# Patient Record
Sex: FEMALE | Race: WHITE | Employment: OTHER | URBAN - METROPOLITAN AREA
[De-identification: names, ages, dates, MRNs, and addresses within clinical notes are randomized per-mention and may not be internally consistent; named-entity substitution may affect disease eponyms.]

---

## 2017-01-03 ENCOUNTER — PATIENT OUTREACH (OUTPATIENT)
Dept: INTERNAL MEDICINE CLINIC | Age: 82
End: 2017-01-03

## 2017-01-05 ENCOUNTER — OFFICE VISIT (OUTPATIENT)
Dept: NEUROLOGY | Age: 82
End: 2017-01-05

## 2017-01-05 VITALS
WEIGHT: 134 LBS | BODY MASS INDEX: 26.31 KG/M2 | OXYGEN SATURATION: 97 % | HEIGHT: 60 IN | HEART RATE: 67 BPM | SYSTOLIC BLOOD PRESSURE: 142 MMHG | DIASTOLIC BLOOD PRESSURE: 82 MMHG | RESPIRATION RATE: 16 BRPM

## 2017-01-05 DIAGNOSIS — R56.9 SEIZURES (HCC): Primary | ICD-10-CM

## 2017-01-05 RX ORDER — LEVETIRACETAM 250 MG/1
250 TABLET ORAL 2 TIMES DAILY
Qty: 180 TAB | Refills: 1 | Status: SHIPPED | OUTPATIENT
Start: 2017-01-05 | End: 2017-06-07 | Stop reason: ALTCHOICE

## 2017-01-05 NOTE — MR AVS SNAPSHOT
Visit Information Date & Time Provider Department Dept. Phone Encounter #  
 1/5/2017  1:40 PM Zak Swanson  [facility] at [address] [id number] Follow-up Instructions Return in about 6 months (around 7/5/2017). Routing History Your Appointments 1/20/2017 11:15 AM  
ROUTINE CARE with Jaime Colbert MD  
[facility] [address]) Appt Note: 1  month f/u  
 [place] [address] [facility] [address]  
[address] [facility] [address] [address] Upcoming Health Maintenance Date Due ZOSTER VACCINE AGE 60> 5/23/1993 INFLUENZA AGE 9 TO ADULT 8/1/2016 MEDICARE YEARLY EXAM 12/9/2016 GLAUCOMA SCREENING Q2Y 6/9/2017 DTaP/Tdap/Td series (2 - Td) 3/31/2026 Allergies as of 1/5/2017  Review Complete On: 1/5/2017 By: Doni Coy No Known Allergies Current Immunizations  Reviewed on 11/15/2016 Name Date Influenza High Dose Vaccine PF 10/13/2015 Influenza Vaccine 10/2/2013 Influenza Vaccine (Quad) PF 9/15/2014 Pneumococcal Conjugate (PCV-13) 11/2/2015 Pneumococcal Polysaccharide (PPSV-23) 9/23/2013 Tdap 3/31/2016  3:27 AM  
  
 Not reviewed this visit You Were Diagnosed With   
  
 Codes Comments Seizures ([facility] [address]    -  Primary ICD-10-CM: R56.9 ICD-9-CM: 780.39 Vitals BP Pulse Resp Height(growth percentile) Weight(growth percentile) SpO2  
 142/82 67 16 5' (1.524 m) 134 lb (60.8 kg) 97% BMI OB Status Smoking Status 26.17 kg/m2 Postmenopausal Former Smoker Vitals History BMI and BSA Data Body Mass Index Body Surface Area  
 26.17 kg/m 2 1.6 m 2 [facility]erred [facility] [phone] Your Updated Medication List  
  
   
 This list is accurate as of: 1/5/17  2:15 PM.  Always use your most recent med list.  
  
  
  
  
 acetaminophen 500 mg tablet Commonly known as:  TYLENOL Take 2 Tabs by mouth every six (6) hours. albuterol 90 mcg/actuation inhaler Commonly known as:  PROAIR HFA Take 2 Puffs by inhalation every four (4) hours as needed for Wheezing or Shortness of Breath. amLODIPine 5 mg tablet Commonly known as:  Crowder Hudson Take 1 Tab by mouth daily. aspirin 81 mg chewable tablet Take 1 Tab by mouth daily. atorvastatin 20 mg tablet Commonly known as:  LIPITOR  
TAKE ONE TABLET BY MOUTH EVERY DAY  
  
 ferrous sulfate 325 mg (65 mg iron) tablet Take 1 Tab by mouth two (2) times a day. Indications: IRON DEFICIENCY ANEMIA MICHAEL-Q PO Take 1 Cap by mouth nightly. latanoprost 0.005 % ophthalmic solution Commonly known as:  Kenith Standing Administer 1 Drop to both eyes nightly. levETIRAcetam 250 mg tablet Commonly known as:  KEPPRA Take 1 Tab by mouth two (2) times a day. lisinopril 5 mg tablet Commonly known as:  Angela Coleen Take 1 Tab by mouth daily. metoprolol tartrate 50 mg tablet Commonly known as:  LOPRESSOR Take 1 Tab by mouth two (2) times a day. MIACALCIN nasal  
Generic drug:  calcitonin (salmon) 1 [place] by IntraNASal route daily. omeprazole 20 mg capsule Commonly known as:  PRILOSEC  
TAKE ONE CAPSULE BY MOUTH ONCE DAILY ONE-HALF HOUR BEFORE A MEAL FOR S TOMACH ACID  
  
 oxyCODONE IR 5 mg immediate release tablet Commonly known as:  Ta Salen Take 1 Tab by mouth every four (4) hours as needed. Max Daily Amount: 30 mg. OYSTER SHELL CALCIUM 500 PO Take 1 Tab by mouth daily. senna-docusate 8.6-50 mg per tablet Commonly known as:  Tempie Shabnam Take 1 Tab by mouth nightly. timolol 0.5 % ophthalmic solution Commonly known as:  TIMOPTIC Administer 1 Drop to right eye two (2) times a day. VITAMIN D3 1,000 unit Cap Generic drug:  cholecalciferol Take 4,000 Units by mouth daily. Prescriptions Sent to Pharmacy Refills  
 levETIRAcetam (KEPPRA) 250 mg tablet 1 Sig: Take 1 Tab by mouth two (2) times a day. Class: Normal  
 [id number] [address] #: [phone] Route: Oral  
  
Follow-up Instructions Return in about 6 months (around 7/5/2017). Patient Instructions A Healthy Lifestyle: Care Instructions Your Care Instructions A healthy lifestyle can help you feel good, stay at a healthy weight, and have plenty of energy for both work and play. A healthy lifestyle is something you can share with your whole family. A healthy lifestyle also can lower your risk for serious health problems, such as high blood pressure, heart disease, and diabetes. You can follow a few steps listed below to improve your health and the health of your family. Follow-up care is a key part of your treatment and safety. Be sure to make and go to all appointments, and call your doctor if you are having problems. Its also a good idea to know your test results and keep a list of the medicines you take. How can you care for yourself at home? · Do not eat too much sugar, fat, or fast foods. You can still have dessert and treats now and then. The goal is moderation. · Start small to improve your eating habits. Pay attention to portion sizes, drink less juice and soda pop, and eat more fruits and vegetables. ¨ Eat a healthy amount of food. A 3-ounce serving of meat, for example, is about the size of a deck of cards. Fill the rest of your plate with vegetables and whole grains. ¨ Limit the amount of soda and sports drinks you have every day. Drink more water when you are thirsty. ¨ Eat at least 5 servings of fruits and vegetables every day.  It may seem like a lot, but it is not hard to reach this goal. A serving or helping is 1 piece of fruit, 1 cup of vegetables, or 2 cups of leafy, raw vegetables. Have an apple or some carrot sticks as an afternoon snack instead of a candy bar. Try to have fruits and/or vegetables at every meal. 
· Make exercise part of your daily routine. You may want to start with simple activities, such as walking, bicycling, or slow swimming. Try to be active 30 to 60 minutes every day. You do not need to do all 30 to 60 minutes all at once. For example, you can exercise 3 times a day for 10 or 20 minutes. Moderate exercise is safe for most people, but it is always a good idea to talk to your doctor before starting an exercise program. 
· Keep moving. Curtis Naseem the lawn, work in the garden, or Green Clean. Take the stairs instead of the elevator at work. · If you smoke, quit. People who smoke have an increased risk for heart attack, stroke, cancer, and other lung illnesses. Quitting is hard, but there are ways to boost your chance of quitting tobacco for good. ¨ Use nicotine gum, patches, or lozenges. ¨ Ask your doctor about stop-smoking programs and medicines. ¨ Keep trying. In addition to reducing your risk of diseases in the future, you will notice some benefits soon after you stop using tobacco. If you have shortness of breath or asthma symptoms, they will likely get better within a few weeks after you quit. · Limit how much alcohol you drink. Moderate amounts of alcohol (up to 2 drinks a day for men, 1 drink a day for women) are okay. But drinking too much can lead to liver problems, high blood pressure, and other health problems. Family health If you have a family, there are many things you can do together to improve your health. · Eat meals together as a family as often as possible. · Eat healthy foods. This includes fruits, vegetables, lean meats and dairy, and whole grains. · Include your family in your fitness plan.  Most people think of activities such as jogging or tennis as the way to fitness, but there are many ways you and your family can be more active. Anything that makes you breathe hard and gets your heart pumping is exercise. Here are some tips: 
¨ Walk to do errands or to take your child to school or the bus. ¨ Go for a family bike ride after dinner instead of watching TV. Where can you learn more? Go to http://adan-matthew.info/. Enter H360 in the search box to learn more about \"A Healthy Lifestyle: Care Instructions. \" Current as of: July 26, 2016 Content Version: 11.1 © 9415-4719 [employer]. Care instructions adapted under license by [employer] (which disclaims liability or warranty for this information). If you have questions about a medical condition or this instruction, always ask your healthcare professional. [address] any warranty or liability for your use of this information. Introducing [facility]! Dear Jake Willams: 
Thank you for requesting a Sezion account. Our records indicate that you have previously registered for a Sezion account but its currently inactive. Please call our Sezion support line at [phone]. Additional Information If you have questions, please visit the Frequently Asked Questions section of the Sezion website at [email or web address] Remember, Sezion is NOT to be used for urgent needs. For medical emergencies, dial 911. Now available from your iPhone and Android! Please provide this summary of care documentation to your next provider. Your primary care clinician is listed as Clarisse Post. If you have any questions after today's visit, please call [phone].

## 2017-01-05 NOTE — PATIENT INSTRUCTIONS

## 2017-01-05 NOTE — PROGRESS NOTES
Neurology Progress Note    HISTORY PROVIDED BY: patient and son    Chief Complaint:   Chief Complaint   Patient presents with    Follow-up    Neurologic Problem    Seizure      Subjective: Halina Dandy is a 80 y.o. RH female initially and last seen on 11/15/16 while hospitalized at [facility]. [facility] 11/15-19/16 for episode of LOC at home resulting in a compression fracture at T7 and tongue trauma, with 2 prior episodes of unexplained LOC, the last in April and associated with tongue trauma at that time as well. Additionally, MRI of the brain with tiny acute infarct in the right deep white matter. Exam revealed upgoing toe on the left, otherwise unremarkable. EEG was  normal. Episodes of LOC with tongue trauma were concerning for seizure. No known seizure risk factors other than age. Family denied concerns about dementia. Recommended starting Keppra 250 mg po bid given high risk for injury with subsequent seizures. The stroke seen on MRI would not have been responsible for LOC or seizure. It is hard to know if the two are related. She was started on aspirin 81 mg a day and recommended continued good control of her stroke risk factors. She was also seen by Cardiology and discharged with a loop recorder. She returns for f/u accompanied by her son. She tells me that she was in rehab at [address] [place] [address] until 12/1/16. Still going to therapy as an outpt. She has not had any subsequent episodes of LOC or unusual spells. She does not remember anything from her hospital stay and is repeating herself more. She is able to take her meds correctly everyday and is remembering her family and friends appropriately. Lives independently in a alf community. Cooks for herself. Hasn't returned to playing bridge yet due to conflicts with appointments.       Past Medical History   Diagnosis Date    Arthritis    [place] lesion, chronic 5/8/2013     Seen on EGD 5/7/13    HEART (dyspnea on exertion) 6/12/2015    GERD (gastroesophageal reflux disease)     Hypercholesterolemia     Hypertension 9/22/2010    Ill-defined condition     Osteoporosis 9/22/2010      Past Surgical History   Procedure Laterality Date    Hx orthopaedic  2010     knee replacement    Endoscopy, colon, diagnostic  2008, 2013    Hx gi  2013     egd   kia ulcer    Hx cataract removal      Pr cardiac surg procedure unlist  2008     nuclear stress test normal     Implant  loop recorder  11/17/2016            Social History     Social History    Marital status: SINGLE     Spouse name: N/A    Number of children: N/A    Years of education: N/A     Occupational History    Not on file. Social History Main Topics    Smoking status: Former Smoker     Years: 30.00     Types: Cigarettes     Quit date: 9/21/2000    Smokeless tobacco: Never Used    Alcohol use No    Drug use: No    Sexual activity: Not Currently     Other Topics Concern    Not on file     Social History Narrative     Family History   Problem Relation Age of Onset    Heart Disease Mother     Heart Disease Father     Heart Disease Sister     Alzheimer Sister     Alcohol abuse Brother           Objective:   ROS:  Per HPI-  Otherwise 10 point ROS was negative    No Known Allergies    Meds:  Outpatient Medications Prior to Visit   Medication Sig Dispense Refill    metoprolol tartrate (LOPRESSOR) 50 mg tablet Take 1 Tab by mouth two (2) times a day. 180 Tab 1    amLODIPine (NORVASC) 5 mg tablet Take 1 Tab by mouth daily. 90 Tab 1    lisinopril (PRINIVIL, ZESTRIL) 5 mg tablet Take 1 Tab by mouth daily. 90 Tab 1    levETIRAcetam (KEPPRA) 250 mg tablet Take 1 Tab by mouth two (2) times a day. 180 Tab 1    cholecalciferol (VITAMIN D3) 1,000 unit cap Take 4,000 Units by mouth daily.  calcitonin, salmon, (MIACALCIN) nasal 1 [place] by IntraNASal route daily.  L. ACIDOPHILUS/STREPT/LA P-AZAEL (MICHAEL-Q PO) Take 1 Cap by mouth nightly.       acetaminophen (TYLENOL) 500 mg tablet Take 2 Tabs by mouth every six (6) hours. 100 Tab 0    aspirin 81 mg chewable tablet Take 1 Tab by mouth daily. 30 Tab 0    oxyCODONE IR (ROXICODONE) 5 mg immediate release tablet Take 1 Tab by mouth every four (4) hours as needed. Max Daily Amount: 30 mg. 20 Tab 0    senna-docusate (PERICOLACE) 8.6-50 mg per tablet Take 1 Tab by mouth nightly. (Patient taking differently: Take 1 Tab by mouth every fourty-eight (48) hours. ) 30 Tab 0    albuterol (PROAIR HFA) 90 mcg/actuation inhaler Take 2 Puffs by inhalation every four (4) hours as needed for Wheezing or Shortness of Breath. 1 Inhaler 4    atorvastatin (LIPITOR) 20 mg tablet TAKE ONE TABLET BY MOUTH EVERY DAY 30 Tab 11    omeprazole (PRILOSEC) 20 mg capsule TAKE ONE CAPSULE BY MOUTH ONCE DAILY ONE-HALF HOUR BEFORE A MEAL FOR S TOMACH ACID 30 Cap 11    CALCIUM CARBONATE (OYSTER SHELL CALCIUM 500 PO) Take 1 Tab by mouth daily.  ferrous sulfate 325 mg (65 mg iron) tablet Take 1 Tab by mouth two (2) times a day. Indications: IRON DEFICIENCY ANEMIA      latanoprost (XALATAN) 0.005 % ophthalmic solution Administer 1 Drop to both eyes nightly.  timolol (TIMOPTIC) 0.5 % ophthalmic solution Administer 1 Drop to right eye two (2) times a day. No facility-administered medications prior to visit. Imaging:  MRI Results (most recent):    Results from Hospital Encounter encounter on 11/15/16   MRI [facility] SPINE WO CONT   Narrative EXAM:  MRI [facility] SPINE WO CONT  INDICATION:  Back pain after fall. X-ray suggest T7 fracture. TECHNIQUE: Sagittal T1, T2, STIR and axial T1 and T2 weighted images of the  thoracic spine were obtained. COMPARISON: Thoracic spine x-ray 11/15/16  FINDINGS:  Mild generalized accentuated thoracic kyphosis. Thoracic vertebral alignment is otherwise adequately maintained. There is a compression fracture involving the superior endplate of T7.  There is  mild edema and a linear hypointensity on T1-weighted images defining fracture  lines. Fracture extends into the posterior endplate without significant  retropulsion. . Findings are consistent with an acute compression compression  fracture. The other thoracic vertebra signal and appearance is within normal limits with  no other acute osseous abnormality demonstrated. Mild disc bulging and minor degenerative changes without significant spinal  stenosis. The thoracic spinal cord has normal size, contour and signal.         Impression IMPRESSION:  Acute T7 superior endplate compression fracture. CT Results (most recent):    Results from Hospital Encounter encounter on 11/15/16   CT HEAD WO CONT   Narrative EXAM:  CT HEAD WITHOUT CONTRAST    INDICATION: Found on the floor after fall. COMPARISON: 3/31/2016. CONTRAST: None. TECHNIQUE: Unenhanced CT of the head was performed using 5 mm images. Brain and  bone windows were generated. Sagittal and coronal reformations were generated. CT dose reduction was achieved through use of a standardized protocol tailored  for this examination and automatic exposure control for dose modulation. CT dose  reduction was achieved through use of a standardized protocol tailored for this  examination and automatic exposure control for dose modulation. Adaptive  statistical iterative reconstruction (ASIR) was utilized for this examination. FINDINGS:  The ventricles and sulci are normal in size, shape and configuration and  midline. There is atherosclerotic calcification of the vertebral arteries. There is patchy decreased attenuation in the periventricular white matter which  is nonspecific but consistent with small vessel disease. There is no  intracranial hemorrhage. There is no extra-axial collection, mass, mass effect  or midline shift. The basilar cisterns are open. No acute infarct is  identified. The bone windows demonstrate no abnormalities.   The visualized  portions of the paranasal sinuses and mastoid air cells are clear.         Impression IMPRESSION: No hemorrhage or acute intracranial abnormality. Microvascular  disease unchanged.              Reviewed records in [employer] and media tab today    Lab Review   Results for orders placed or performed during the hospital encounter of 11/19/16   CULTURE, URINE   Result Value Ref Range    Special Requests: NO SPECIAL REQUESTS      [place] Count >100,000  COLONIES/mL        Culture result: MIXED UROGENITAL MICHAEL ISOLATED     VITAMIN D, 25 HYDROXY   Result Value Ref Range    Vitamin D 25-Hydroxy 28.2 (L) 30 - 100 ng/mL   URINALYSIS W/ REFLEX CULTURE   Result Value Ref Range    Color YELLOW/STRAW      Appearance CLOUDY (A) CLEAR      Specific gravity 1.022 1.003 - 1.030      pH (UA) 6.5 5.0 - 8.0      Protein NEGATIVE  NEG mg/dL    Glucose NEGATIVE  NEG mg/dL    Ketone NEGATIVE  NEG mg/dL    Bilirubin NEGATIVE  NEG      Blood NEGATIVE  NEG      Urobilinogen 0.2 0.2 - 1.0 EU/dL    Nitrites NEGATIVE  NEG      Leukocyte Esterase MODERATE (A) NEG      WBC 20-50 0 - 4 /hpf    RBC 5-10 0 - 5 /hpf    Epithelial cells MODERATE (A) FEW /lpf    Bacteria NEGATIVE  NEG /hpf    UA:UC IF INDICATED URINE CULTURE ORDERED (A) CNI      Other: Renal Epithelial cells Present     CBC W/O DIFF   Result Value Ref Range    WBC 7.8 3.6 - 11.0 K/uL    RBC 4.52 3.80 - 5.20 M/uL    HGB 10.1 (L) 11.5 - 16.0 g/dL    HCT 33.6 (L) 35.0 - 47.0 %    MCV 74.3 (L) 80.0 - 99.0 FL    MCH 22.3 (L) 26.0 - 34.0 PG    MCHC 30.1 30.0 - 36.5 g/dL    RDW 19.7 (H) 11.5 - 14.5 %    PLATELET 070 (H) 850 - 866 K/uL   METABOLIC PANEL, BASIC   Result Value Ref Range    Sodium 138 136 - 145 mmol/L    Potassium 4.2 3.5 - 5.1 mmol/L    Chloride 100 97 - 108 mmol/L    CO2 27 21 - 32 mmol/L    Anion gap 11 5 - 15 mmol/L    Glucose 72 65 - 100 mg/dL    BUN 12 6 - 20 MG/DL    Creatinine 0.88 0.55 - 1.02 MG/DL    BUN/Creatinine ratio 14 12 - 20      GFR est AA >60 >60 ml/min/1.73m2    GFR est non-AA >60 >60 ml/min/1.73m2    Calcium 9.1 8.5 - 10.1 MG/DL   MAGNESIUM   Result Value Ref Range    Magnesium 1.8 1.6 - 2.4 mg/dL   PHOSPHORUS   Result Value Ref Range    Phosphorus 4.2 2.6 - 4.7 MG/DL        Exam:  Visit Vitals    /82    Pulse 67    Resp 16    Ht 5' (1.524 m)    Wt 60.8 kg (134 lb)    SpO2 97%    BMI 26.17 kg/m2     General:  Alert, cooperative, no distress. Head:  Normocephalic, without obvious abnormality, atraumatic. Respiratory:  Heart:   Non labored breathing  Regular rate and rhythm, no murmurs       Extremities: Warm, no cyanosis or edema. Pulses: 2+ radial pulses. Neurologic:  MS: Alert, speech intact. Language -see MMSE Attention and fund of knowledge appropriate. Recent and remote memory intact.   Cranial Nerves:  II: visual fields Full to confrontation   II: pupils    II: optic disc    III,VII: ptosis none   III,IV,VI: extraocular muscles  EOMI, no nystagmus or diplopia   V: facial light touch sensation     VII: facial muscle function   symmetric   VIII: hearing intact   IX: soft palate elevation     XI: trapezius strength     XI: sternocleidomastoid strength    XII: tongue       Motor: normal bulk and tone, no tremor              Strength: 5/5 throughout, no PD  Coordination: FTN and HTS intact, SUNITHA intact  Gait: normal gait  Reflexes: 2+ symmetric    Mini Mental State Exam 1/5/2017   What is the Year 1   What is the Season 1   What is the Date 1   What is the Day 1   What is the Month 1   Where are we State 1   Where are we Country 1   Where are we [place] or [employer] 1   Cook are we Floor 1   Name three objects, then ask the patient to say them 3   Serial sevens Subtract 7 from 100 in increments 5   Ask for the three objects repeated above 3   Name a pencil 1   Name a watch 1   Have the patient repeat this phrase \"No ifs, ands, or buts\" 1   Three stage command: Take the paper in your right hand 1   Fold the paper in half 1   Put the paper on the floor 1   Read and obey the following: CLOSE YOUR EYES 1   Have the patient write a sentence 1   Have the patient copy a figure 1   Mini Mental Score 30          Assessment/Plan   Pt is an 80 y.o. RH female hospitalized at [facility] 11/15-19/16 for episode of LOC at home resulting in a compression fracture at T7 and tongue trauma, with 2 prior episodes of unexplained LOC, the last in April and associated with tongue trauma at that time as well. Additionally, MRI of the brain with tiny acute infarct in the right deep white matter. EEG was  normal. Started on Keppra 250mg bid empirically for seizures without episode of LOC since discharge. Exam with MMSE score of 30/30, and is non-focal.  Will continue to monitor for memory loss. Continue Keppra 250mg bid. F/u in clinic in six months, instructed to call in the interim if needed. ICD-10-CM ICD-9-CM    1. Seizures ([facility] [address] R56.9 780.39        Signed:   Ciera Boyle MD  1/5/2017

## 2017-01-06 ENCOUNTER — DOCUMENTATION ONLY (OUTPATIENT)
Dept: CARDIOLOGY CLINIC | Age: 82
End: 2017-01-06

## 2017-01-13 ENCOUNTER — OFFICE VISIT (OUTPATIENT)
Dept: CARDIOLOGY CLINIC | Age: 82
End: 2017-01-13

## 2017-01-13 DIAGNOSIS — Z95.818 STATUS POST PLACEMENT OF IMPLANTABLE LOOP RECORDER: Primary | ICD-10-CM

## 2017-01-27 ENCOUNTER — HOSPITAL ENCOUNTER (OUTPATIENT)
Dept: LAB | Age: 82
Discharge: HOME | End: 2017-01-27
Payer: MEDICARE

## 2017-01-27 ENCOUNTER — OFFICE VISIT (OUTPATIENT)
Dept: INTERNAL MEDICINE CLINIC | Age: 82
End: 2017-01-27

## 2017-01-27 VITALS
HEIGHT: 60 IN | OXYGEN SATURATION: 96 % | TEMPERATURE: 96.1 F | WEIGHT: 134 LBS | RESPIRATION RATE: 18 BRPM | HEART RATE: 64 BPM | DIASTOLIC BLOOD PRESSURE: 62 MMHG | SYSTOLIC BLOOD PRESSURE: 119 MMHG | BODY MASS INDEX: 26.31 KG/M2

## 2017-01-27 DIAGNOSIS — M54.6 MIDLINE THORACIC BACK PAIN, UNSPECIFIED CHRONICITY: ICD-10-CM

## 2017-01-27 DIAGNOSIS — D50.0 IRON DEFICIENCY ANEMIA DUE TO CHRONIC BLOOD LOSS: ICD-10-CM

## 2017-01-27 DIAGNOSIS — M81.0 OSTEOPOROSIS: Primary | ICD-10-CM

## 2017-01-27 DIAGNOSIS — I10 HTN (HYPERTENSION), BENIGN: ICD-10-CM

## 2017-01-27 PROCEDURE — 36415 COLL VENOUS BLD VENIPUNCTURE: CPT

## 2017-01-27 PROCEDURE — 85025 COMPLETE CBC W/AUTO DIFF WBC: CPT

## 2017-01-27 RX ORDER — ALENDRONATE SODIUM 70 MG/1
70 TABLET ORAL
Qty: 12 TAB | Refills: 3 | Status: SHIPPED | OUTPATIENT
Start: 2017-02-27 | End: 2018-01-05 | Stop reason: SDUPTHER

## 2017-01-27 NOTE — LETTER
1/30/2017 10:07 AM 
 
Ms. Khanh Dunlap [address] [facility] [phone] Dear Khanh Res: 
 
Please find your most recent results below. Resulted Orders CBC WITH AUTOMATED DIFF Result Value Ref Range WBC 8.5 3.4 - 10.8 x10E3/uL  
 RBC 4.88 3.77 - 5.28 x10E6/uL HGB 12.4 11.1 - 15.9 g/dL HCT 38.9 34.0 - 46.6 % MCV 80 79 - 97 fL  
 MCH 25.4 (L) 26.6 - 33.0 pg  
 MCHC 31.9 31.5 - 35.7 g/dL RDW 23.5 (H) 12.3 - 15.4 % PLATELET 973 (H) 079 - 379 x10E3/uL NEUTROPHILS 70 % Lymphocytes 19 % MONOCYTES 9 % EOSINOPHILS 1 % BASOPHILS 1 %  
 ABS. NEUTROPHILS 5.9 1.4 - 7.0 x10E3/uL Abs Lymphocytes 1.6 0.7 - 3.1 x10E3/uL  
 ABS. MONOCYTES 0.8 0.1 - 0.9 x10E3/uL  
 ABS. EOSINOPHILS 0.1 0.0 - 0.4 x10E3/uL  
 ABS. BASOPHILS 0.1 0.0 - 0.2 x10E3/uL IMMATURE GRANULOCYTES 0 %  
 ABS. IMM. GRANS. 0.0 0.0 - 0.1 x10E3/uL Hematology comments: Note:   
   Comment:  
   Verified by microscopic examination. Narrative Performed at:  [address]  [id number] : Yue Carpio MD, Phone:  [phone] METABOLIC PANEL, BASIC Result Value Ref Range Glucose 112 (H) 65 - 99 mg/dL BUN 11 8 - 27 mg/dL Creatinine 0.83 0.57 - 1.00 mg/dL GFR est non-AA 65 >59 mL/min/1.73 GFR est AA 75 >59 mL/min/1.73  
 BUN/Creatinine ratio 13 11 - 26 Sodium 138 134 - 144 mmol/L Potassium 4.5 3.5 - 5.2 mmol/L Chloride 97 96 - 106 mmol/L  
 CO2 25 18 - 29 mmol/L Calcium 9.6 8.7 - 10.3 mg/dL Narrative Performed at:  [address] [place]  [id number] : Yue Carpio MD, Phone:  [phone] RECOMMENDATIONS: 
Blood work looks great. Please call me if you have any questions: [phone] Sincerely, Ezequiel Coppola MD

## 2017-01-27 NOTE — MR AVS SNAPSHOT
Visit Information Date & Time Provider Department Dept. Phone Encounter #  
 1/27/2017  2:00 PM Yovany Blackwood MD [facility] [phone] [id number] Your Appointments 7/5/2017 10:40 AM  
Follow Up with Yadi Garcia MD  
[facility] at [facility]) Appt Note: 6 mo F/U   KU 1/6  
 [address] [facility] [address]  
[phone]  
  
   
 [address] [facility] [address] Upcoming Health Maintenance Date Due ZOSTER VACCINE AGE 60> 5/23/1993 INFLUENZA AGE 9 TO ADULT 8/1/2016 MEDICARE YEARLY EXAM 12/9/2016 GLAUCOMA SCREENING Q2Y 6/9/2017 DTaP/Tdap/Td series (2 - Td) 3/31/2026 Allergies as of 1/27/2017  Review Complete On: 1/27/2017 By: Gisselle Hull LPN No Known Allergies Current Immunizations  Reviewed on 11/15/2016 Name Date Influenza High Dose Vaccine PF 10/13/2015 Influenza Vaccine 10/2/2013 Influenza Vaccine (Quad) PF 9/15/2014 Pneumococcal Conjugate (PCV-13) 11/2/2015 Pneumococcal Polysaccharide (PPSV-23) 9/23/2013 Tdap 3/31/2016  3:27 AM  
  
 Not reviewed this visit You Were Diagnosed With   
  
 Codes Comments Osteoporosis    -  Primary ICD-10-CM: M81.0 ICD-9-CM: 733.00 Iron deficiency anemia due to chronic blood loss     ICD-10-CM: D50.0 ICD-9-CM: 280.0 Vitals BP Pulse Temp Resp Height(growth percentile) Weight(growth percentile)  
 119/62 (BP 1 Location: Left arm, BP Patient Position: Sitting) 64 96.1 °F (35.6 °C) (Oral) 18 5' (1.524 m) 134 lb (60.8 kg) SpO2 BMI OB Status Smoking Status 96% 26.17 kg/m2 Postmenopausal Former Smoker BMI and BSA Data Body Mass Index Body Surface Area  
 26.17 kg/m 2 1.6 m 2 Preferred Pharmacy Pharmacy Name Phone [facility] [place] [address] [phone] Your Updated Medication List  
  
 This list is accurate as of: 1/27/17  2:49 PM.  Always use your most recent med list.  
  
  
  
  
 acetaminophen 500 mg tablet Commonly known as:  TYLENOL Take 2 Tabs by mouth every six (6) hours. albuterol 90 mcg/actuation inhaler Commonly known as:  PROAIR HFA Take 2 Puffs by inhalation every four (4) hours as needed for Wheezing or Shortness of Breath. alendronate 70 mg tablet Commonly known as:  FOSAMAX Take 1 Tab by mouth every seven (7) days. Start taking on:  2/27/2017  
  
 amLODIPine 5 mg tablet Commonly known as:  Carey Eagles Take 1 Tab by mouth daily. aspirin 81 mg chewable tablet Take 1 Tab by mouth daily. atorvastatin 20 mg tablet Commonly known as:  LIPITOR  
TAKE ONE TABLET BY MOUTH EVERY DAY  
  
 ferrous sulfate 325 mg (65 mg iron) tablet Take 1 Tab by mouth two (2) times a day. Indications: IRON DEFICIENCY ANEMIA MICHAEL-Q PO Take 1 Cap by mouth nightly. latanoprost 0.005 % ophthalmic solution Commonly known as:  Lindsay Kim Administer 1 Drop to both eyes nightly. levETIRAcetam 250 mg tablet Commonly known as:  KEPPRA Take 1 Tab by mouth two (2) times a day. lisinopril 5 mg tablet Commonly known as:  Jennifer Hu Take 1 Tab by mouth daily. metoprolol tartrate 50 mg tablet Commonly known as:  LOPRESSOR Take 1 Tab by mouth two (2) times a day. MIACALCIN nasal  
Generic drug:  calcitonin (salmon) 1 Gregory by IntraNASal route daily. omeprazole 20 mg capsule Commonly known as:  PRILOSEC  
TAKE ONE CAPSULE BY MOUTH ONCE DAILY ONE-HALF HOUR BEFORE A MEAL FOR S TOMACH ACID OYSTER SHELL CALCIUM 500 PO Take 1 Tab by mouth daily. senna-docusate 8.6-50 mg per tablet Commonly known as:  Leellen Cancel Take 1 Tab by mouth nightly. timolol 0.5 % ophthalmic solution Commonly known as:  TIMOPTIC Administer 1 Drop to right eye two (2) times a day. VITAMIN D3 1,000 unit Cap Generic drug:  cholecalciferol Take 4,000 Units by mouth daily. Prescriptions Sent to Pharmacy Refills  
 alendronate (FOSAMAX) 70 mg tablet 3 Starting on: 2/27/2017 Sig: Take 1 Tab by mouth every seven (7) days. Class: Normal  
 Pharmacy: [id number] [facility], [address] #: [phone] Route: Oral  
  
We Performed the Following CBC WITH AUTOMATED DIFF [99791 CPT(R)] METABOLIC PANEL, BASIC [88920 CPT(R)] Introducing [facility] & HEALTH SERVICES! [facility]: 
Thank you for requesting a [employer] account. Our records indicate that you have previously registered for a [employer] account but its currently inactive. Please call our [employer] support line at [phone]. Additional Information If you have questions, please visit the Frequently Asked Questions section of the [employer] website at [email or web address] Remember, [employer] is NOT to be used for urgent needs. For medical emergencies, dial 911. Now available from your iPhone and Android! Please provide this summary of care documentation to your next provider. Your primary care clinician is listed as Glo Husain. If you have any questions after today's visit, please call [phone].

## 2017-01-27 NOTE — PROGRESS NOTES
Chief Complaint   Patient presents with   [address] [facility] Follow Up     stroke f/u     Subjective: Analisa Bowers is a 80 y.o. RH female initially and last seen on 11/15/16 while hospitalized at [facility]. [facility] 11/15-19/16 for episode of LOC at home resulting in a compression fracture at T7 and tongue trauma, with 2 prior episodes of unexplained LOC, the last in April and associated with tongue trauma at that time as well. Additionally, MRI of the brain with tiny acute infarct in the right deep white matter. Exam revealed upgoing toe on the left, otherwise unremarkable. EEG was normal. Episodes of LOC with tongue trauma were concerning for seizure. No known seizure risk factors other than age. Family denied concerns about dementia. Recommended starting Keppra 250 mg po bid given high risk for injury with subsequent seizures. The stroke seen on MRI would not have been responsible for LOC or seizure. It is hard to know if the two are related. She was started on aspirin 81 mg a day and recommended continued good control of her stroke risk factors. She was also seen by Cardiology and discharged with a loop recorder. (negative)  was in rehab at [address] [place] [address] until 12/1/16. Still going to therapy as an outpt. She has not had any subsequent episodes of LOC or unusual spells. She does not remember anything from her hospital stay and is repeating herself more. She is able to take her meds correctly everyday and is remembering her family and friends appropriately. Lives independently in a group home community. Cooks for herself.  Hasn't returned to playing bridge yet due to conflicts with appointments.        Pain is gome from compression fracture still using the calcitonin spray, back home and  Independent  again    Patient Active Problem List    Diagnosis    Stroke ([facility] [facility]    HTN (hypertension), benign    Diastolic dysfunction    HEART (dyspnea on exertion)    COPD (chronic obstructive pulmonary disease) ([address]    Khadar chowdhury, chronic     Seen on EGD 5/7/13      Iron deficiency anemia    Osteoporosis     Fosamax  2005  No fractures  In all these years      Hypercholesteremia       Denies gerd    Vitals:    01/27/17 1425   BP: 119/62   Pulse: 64   Resp: 18   Temp: 96.1 °F (35.6 °C)   TempSrc: Oral   SpO2: 96%   Weight: 134 lb (60.8 kg)   Height: 5' (1.524 m)     S1 and S2 normal, no murmurs, clicks, gallops or rubs. Regular rate and rhythm. Chest is clear; no wheezes or rales. No edema or JVD. Neuro intact  Uses a cane for ambulation support and balance        Nic Hernandez was seen today for hospital follow up. Diagnoses and all orders for this visit:    Osteoporosis  -     CBC WITH AUTOMATED DIFF  -     METABOLIC PANEL, BASIC    Iron deficiency anemia due to chronic blood loss  -     CBC WITH AUTOMATED DIFF    HTN (hypertension), benign    Midline thoracic back pain, unspecified chronicity    Other orders  -     alendronate (FOSAMAX) 70 mg tablet; Take 1 Tab by mouth every seven (7) days. Will  Begin fosamax in February  \  \Instructions on Fosamax use and side effects - particularly esophageal adverse events - are carefully reviewed with her. This drug must be taken upon arising for the day on an empty stomach, with a large 6-8 ounce glass of water; she must remain NPO in the upright position for at least 30 minutes afterwards and until after the first food of the day. If esophageal irritation is noted, she will stop the drug and call my office.       Prolonged visit with 15 minutes of time out  of more than a  25 minute visit spent counseling patient and family and formulation of care

## 2017-01-28 LAB
BASOPHILS # BLD AUTO: 0.1 X10E3/UL (ref 0–0.2)
BASOPHILS NFR BLD AUTO: 1 %
BUN SERPL-MCNC: 11 MG/DL (ref 8–27)
BUN/CREAT SERPL: 13 (ref 11–26)
CALCIUM SERPL-MCNC: 9.6 MG/DL (ref 8.7–10.3)
CHLORIDE SERPL-SCNC: 97 MMOL/L (ref 96–106)
CO2 SERPL-SCNC: 25 MMOL/L (ref 18–29)
CREAT SERPL-MCNC: 0.83 MG/DL (ref 0.57–1)
EOSINOPHIL # BLD AUTO: 0.1 X10E3/UL (ref 0–0.4)
EOSINOPHIL NFR BLD AUTO: 1 %
ERYTHROCYTE [DISTWIDTH] IN BLOOD BY AUTOMATED COUNT: 23.5 % (ref 12.3–15.4)
GLUCOSE SERPL-MCNC: 112 MG/DL (ref 65–99)
HCT VFR BLD AUTO: 38.9 % (ref 34–46.6)
HGB BLD-MCNC: 12.4 G/DL (ref 11.1–15.9)
IMM GRANULOCYTES # BLD: 0 X10E3/UL (ref 0–0.1)
IMM GRANULOCYTES NFR BLD: 0 %
LYMPHOCYTES # BLD AUTO: 1.6 X10E3/UL (ref 0.7–3.1)
LYMPHOCYTES NFR BLD AUTO: 19 %
MCH RBC QN AUTO: 25.4 PG (ref 26.6–33)
MCHC RBC AUTO-ENTMCNC: 31.9 G/DL (ref 31.5–35.7)
MCV RBC AUTO: 80 FL (ref 79–97)
MONOCYTES # BLD AUTO: 0.8 X10E3/UL (ref 0.1–0.9)
MONOCYTES NFR BLD AUTO: 9 %
MORPHOLOGY BLD-IMP: ABNORMAL
NEUTROPHILS # BLD AUTO: 5.9 X10E3/UL (ref 1.4–7)
NEUTROPHILS NFR BLD AUTO: 70 %
PLATELET # BLD AUTO: 420 X10E3/UL (ref 150–379)
POTASSIUM SERPL-SCNC: 4.5 MMOL/L (ref 3.5–5.2)
RBC # BLD AUTO: 4.88 X10E6/UL (ref 3.77–5.28)
SODIUM SERPL-SCNC: 138 MMOL/L (ref 134–144)
WBC # BLD AUTO: 8.5 X10E3/UL (ref 3.4–10.8)

## 2017-01-30 NOTE — PROGRESS NOTES
Letter sent to the address on file to notify the patient per Dr Rui Isabel that the blood work looks great. Asked for a return call to the office with any additional questions.

## 2017-02-24 ENCOUNTER — TELEPHONE (OUTPATIENT)
Dept: CARDIOLOGY CLINIC | Age: 82
End: 2017-02-24

## 2017-02-24 NOTE — TELEPHONE ENCOUNTER
----- Message from Sarah Johnson NP sent at 2/24/2017  8:40 AM EST -----  Please call the patient and see if she felt the tachycardia 2/23/17 at 1339. Can we please schedule her for next week, wed or Thursday?

## 2017-02-27 NOTE — TELEPHONE ENCOUNTER
Verified patient with two types of identifiers. Spoke to patient and she states that she did not feel anything on Thursday. Made appt for 3/6/17 with Dr Luis Temple.

## 2017-03-06 ENCOUNTER — OFFICE VISIT (OUTPATIENT)
Dept: CARDIOLOGY CLINIC | Age: 82
End: 2017-03-06

## 2017-03-06 VITALS
OXYGEN SATURATION: 99 % | DIASTOLIC BLOOD PRESSURE: 70 MMHG | BODY MASS INDEX: 26.7 KG/M2 | SYSTOLIC BLOOD PRESSURE: 110 MMHG | RESPIRATION RATE: 16 BRPM | HEIGHT: 60 IN | HEART RATE: 56 BPM | WEIGHT: 136 LBS

## 2017-03-06 DIAGNOSIS — I48.92 ATRIAL FLUTTER, PAROXYSMAL (HCC): Primary | ICD-10-CM

## 2017-03-06 DIAGNOSIS — Z95.9 CARDIAC DEVICE IN SITU: ICD-10-CM

## 2017-03-06 DIAGNOSIS — R55 SYNCOPE, UNSPECIFIED SYNCOPE TYPE: ICD-10-CM

## 2017-03-06 DIAGNOSIS — I10 HTN (HYPERTENSION), BENIGN: ICD-10-CM

## 2017-03-06 DIAGNOSIS — I63.9 CEREBROVASCULAR ACCIDENT (CVA), UNSPECIFIED MECHANISM (HCC): ICD-10-CM

## 2017-03-06 NOTE — PROGRESS NOTES
Cardiac Electrophysiology Office Note     Subjective: Calton Severs is a 80 y.o. patient who is seen for follow up after implantable loop recorder  She just came home after rehab/SNF  He has connected remote [employer]. She is here today for follow up, ILR recorder showed 30 seconds of atrial flutter for 30 second on February 23, 2017 at 1339. She denies feeling the heart rate fast, palpitations. No SOB. No chest pain, lightheadedness or dizziness. When I saw her in the past at [facility]:  She was seen for evaluation of syncope kindly referred by Dr. Jenna Singh.    PMHx includes chronic anemia, hypertension.    The patient reports she has had a total of 4 episodes of syncope, most recent one was yesterday, March 2016, 2010 and 2008.    The patient reports she was laying in bed, the next thing she remembers is waking up on floor at the foot of the bed. She was alert when she woke up, she cannot recall how she got on the floor. She felt \"wiped out\" and had some difficulty moving so she \"scooted\" on the floor the living room and used the IPAD to e-mail her son.    She came to [address] [place] [address], MRI of head revealed suspect small acute posterior frontal centrum semiovale lacunar infarct. MRA of neck- normal  Echo shows normal LVEF, no RWMA. Mild TR and pulmonic valve regurgitation. Xray of spine showed T7 compression fracture of unknown age.       Prior to this she had a similar episode in March of this year, she was getting ready for bed and while walking to the bed she collapsed and lacerated her lip on the bookcase, this did require a hospital visit and sutures. Episode in 2010, similar except she did have associated dizziness prior to fainting. She went to [address] [place] [address] at this time and was told she had a TIA. Episode in 2008 as well.    She denies hx of MI/DVT/PE/DM. She is very independent at home. No issues ambulating.    No family hx of pacemaker.    Mother/father and brother CAD.      Patient Active Problem List   Diagnosis Code    Osteoporosis M81.0    Hypercholesteremia E78.00    Iron deficiency anemia D50.9    Khadar lesion, chronic K25.7    HEART (dyspnea on exertion) R06.09    COPD (chronic obstructive pulmonary disease) ([facility]) G99.8    Diastolic dysfunction W17.6    HTN (hypertension), benign I10    Stroke ([facility]) I63.9     Current Outpatient Prescriptions   Medication Sig Dispense Refill    alendronate (FOSAMAX) 70 mg tablet Take 1 Tab by mouth every seven (7) days. 12 Tab 3    levETIRAcetam (KEPPRA) 250 mg tablet Take 1 Tab by mouth two (2) times a day. 180 Tab 1    metoprolol tartrate (LOPRESSOR) 50 mg tablet Take 1 Tab by mouth two (2) times a day. 180 Tab 1    amLODIPine (NORVASC) 5 mg tablet Take 1 Tab by mouth daily. 90 Tab 1    lisinopril (PRINIVIL, ZESTRIL) 5 mg tablet Take 1 Tab by mouth daily. 90 Tab 1    cholecalciferol (VITAMIN D3) 1,000 unit cap Take 4,000 Units by mouth daily.  acetaminophen (TYLENOL) 500 mg tablet Take 2 Tabs by mouth every six (6) hours. 100 Tab 0    aspirin 81 mg chewable tablet Take 1 Tab by mouth daily. 30 Tab 0    albuterol (PROAIR HFA) 90 mcg/actuation inhaler Take 2 Puffs by inhalation every four (4) hours as needed for Wheezing or Shortness of Breath. 1 Inhaler 4    atorvastatin (LIPITOR) 20 mg tablet TAKE ONE TABLET BY MOUTH EVERY DAY 30 Tab 11    omeprazole (PRILOSEC) 20 mg capsule TAKE ONE CAPSULE BY MOUTH ONCE DAILY ONE-HALF HOUR BEFORE A MEAL FOR S TOMACH ACID 30 Cap 11    CALCIUM CARBONATE (OYSTER SHELL CALCIUM 500 PO) Take 1 Tab by mouth daily.  ferrous sulfate 325 mg (65 mg iron) tablet Take 1 Tab by mouth two (2) times a day. Indications: IRON DEFICIENCY ANEMIA      latanoprost (XALATAN) 0.005 % ophthalmic solution Administer 1 Drop to both eyes nightly.  timolol (TIMOPTIC) 0.5 % ophthalmic solution Administer 1 Drop to right eye two (2) times a day.  L. ACIDOPHILUS/STREPT/LA P-AZAEL (MICHAEL-Q PO) Take 1 Cap by mouth nightly.  senna-docusate (PERICOLACE) 8.6-50 mg per tablet Take 1 Tab by mouth nightly. (Patient taking differently: Take 1 Tab by mouth every fourty-eight (48) hours. ) 30 Tab 0     No Known Allergies  Past Medical History:   Diagnosis Date    Arthritis     Khadar lesion, chronic 5/8/2013    Seen on EGD 5/7/13    HEART (dyspnea on exertion) 6/12/2015    GERD (gastroesophageal reflux disease)     Hypercholesterolemia     Hypertension 9/22/2010    Ill-defined condition     Osteoporosis 9/22/2010     Past Surgical History:   Procedure Laterality Date    CARDIAC SURG PROCEDURE UNLIST  2008    nuclear stress test normal     ENDOSCOPY, COLON, DIAGNOSTIC  2008, 2013    HX CATARACT REMOVAL      HX GI  2013    egd   khadar ulcer    HX ORTHOPAEDIC  2010    knee replacement    IMPLANT  LOOP RECORDER  11/17/2016          Family History   Problem Relation Age of Onset    Heart Disease Mother     Heart Disease Father     Heart Disease Sister     Alzheimer Sister     Alcohol abuse Brother      Social History   Substance Use Topics    Smoking status: Former Smoker     Years: 30.00     Types: Cigarettes     Quit date: 9/21/2000    Smokeless tobacco: Never Used    Alcohol use No        Review of Systems:   Constitutional: Negative for fever, chills, weight loss, malaise/fatigue. HEENT: Negative for nosebleeds, vision changes. Respiratory: Negative for cough, hemoptysis  Cardiovascular: Negative for chest pain, palpitations, orthopnea, claudication, leg swelling, syncope, and PND. Gastrointestinal: Negative for nausea, vomiting, soft stools    Genitourinary: Negative for dysuria, and hematuria. Musculoskeletal: Negative for myalgias, arthralgia. Skin: Negative for rash. Heme: Does not bleed or bruise easily. Neurological: Negative for speech change and focal weakness     Objective:     Physical Exam:   Constitutional: well-nourished. No respiratory distress. Head: Normocephalic and atraumatic.    Eyes: Pupils are equal, round  ENT: hearing normal  Neck: supple. No JVD present. Cardiovascular: Normal rate, regular rhythm. Exam reveals no gallop and no friction rub. No murmur heard. Pulmonary/Chest: Effort normal and breath sounds normal. No wheezes. Abdominal: Soft, no tenderness. Musculoskeletal: no edema. Neurological: alert,oriented. walk in with a cane  Skin: Skin is warm and dry. Left sided ILR healed  Psychiatric: normal mood and affect. Behavior is normal. Judgment and thought content normal.         Assessment/Plan:       ICD-10-CM ICD-9-CM    1. Cardiac device in situ Z95.9 V45.00    2. Cerebrovascular accident (CVA), unspecified mechanism ([facility] [address] I63.9 434.91    3. HTN (hypertension), benign I10 401.1    4. Atrial flutter, paroxysmal (HCC) I48.92 427.32       Reviewed loop monitor results with the patient and her son. She was asymptomatic. Episode of atrial flutter 30 seconds. She seems to be getting stronger and is walking the cane minimally now, no recent falls. BP controlled. Discussed possibility of starting a [address] if he continues to have more episodes of atrial flutter or fibrillation. No pauses or severe bradycardia seen on device. Thank you for involving me in this patient's care and please call with further concerns or questions. Nuris Kingsley M.D.   Electrophysiology/Cardiology  [facility] [place]  [address] , [facility] [address]  [address] [facility], [address] [place] [address]  [phone]

## 2017-03-06 NOTE — PROGRESS NOTES
Chief Complaint   Patient presents with    Irregular Heart Beat     tachycardia on loop monitor. Denies chest pain/swelling. Occasional dypsnea on exertion/shortness of breath.       Hypertension

## 2017-03-06 NOTE — MR AVS SNAPSHOT
Visit Information Date & Time Provider Department Dept. Phone Encounter #  
 3/6/2017  4:20 PM Vel Phillips MD [facility] [phone] [id number] Your Appointments 4/28/2017 11:15 AM  
ROUTINE CARE with Jaiden Whiting MD  
[facility] [address]) Appt Note: 3 month f/u  
 [place] [address] [facility] [address]  
[phone]  
  
   
 [place] [address] [address]  
  
    
 7/5/2017 10:40 AM  
Follow Up with Rakesh Falcon MD  
[facility] at [address]) Appt Note: 6 mo F/U   KU 1/6  
 [address] [facility] [address]  
[phone]  
  
   
 [address] [facility]  [address] Upcoming Health Maintenance Date Due ZOSTER VACCINE AGE 60> 5/23/1993 INFLUENZA AGE 9 TO ADULT 8/1/2016 MEDICARE YEARLY EXAM 12/9/2016 GLAUCOMA SCREENING Q2Y 6/9/2017 DTaP/Tdap/Td series (2 - Td) 3/31/2026 Allergies as of 3/6/2017  Review Complete On: 3/6/2017 By: Torrie Neff RN No Known Allergies Current Immunizations  Reviewed on 11/15/2016 Name Date Influenza High Dose Vaccine PF 10/13/2015 Influenza Vaccine 10/2/2013 Influenza Vaccine (Quad) PF 9/15/2014 Pneumococcal Conjugate (PCV-13) 11/2/2015 Pneumococcal Polysaccharide (PPSV-23) 9/23/2013 Tdap 3/31/2016  3:27 AM  
  
 Not reviewed this visit Vitals BP Pulse Resp Height(growth percentile) Weight(growth percentile) SpO2  
 110/70 (BP 1 Location: Right arm, BP Patient Position: Sitting) (!) 56 16 5' (1.524 m) 136 lb (61.7 kg) 99% BMI OB Status Smoking Status 26.56 kg/m2 Postmenopausal Former Smoker Vitals History BMI and BSA Data Body Mass Index Body Surface Area  
 26.56 kg/m 2 1.62 m 2 Preferred Pharmacy Pharmacy Name Phone [address] [address] [phone] Your Updated Medication List  
  
   
This list is accurate as of: 3/6/17  5:08 PM.  Always use your most recent med list.  
  
  
  
  
 acetaminophen 500 mg tablet Commonly known as:  TYLENOL Take 2 Tabs by mouth every six (6) hours. albuterol 90 mcg/actuation inhaler Commonly known as:  PROAIR HFA Take 2 Puffs by inhalation every four (4) hours as needed for Wheezing or Shortness of Breath. alendronate 70 mg tablet Commonly known as:  FOSAMAX Take 1 Tab by mouth every seven (7) days. amLODIPine 5 mg tablet Commonly known as:  Voncile [place] Take 1 Tab by mouth daily. aspirin 81 mg chewable tablet Take 1 Tab by mouth daily. atorvastatin 20 mg tablet Commonly known as:  LIPITOR  
TAKE ONE TABLET BY MOUTH EVERY DAY  
  
 ferrous sulfate 325 mg (65 mg iron) tablet Take 1 Tab by mouth two (2) times a day. Indications: IRON DEFICIENCY ANEMIA MICHAEL-Q PO Take 1 Cap by mouth nightly. latanoprost 0.005 % ophthalmic solution Commonly known as:  Rebecca Trevor Administer 1 Drop to both eyes nightly. levETIRAcetam 250 mg tablet Commonly known as:  KEPPRA Take 1 Tab by mouth two (2) times a day. lisinopril 5 mg tablet Commonly known as:  Salvatore Bridges Take 1 Tab by mouth daily. metoprolol tartrate 50 mg tablet Commonly known as:  LOPRESSOR Take 1 Tab by mouth two (2) times a day. omeprazole 20 mg capsule Commonly known as:  PRILOSEC  
TAKE ONE CAPSULE BY MOUTH ONCE DAILY ONE-HALF HOUR BEFORE A MEAL FOR S TOMACH ACID OYSTER SHELL CALCIUM 500 PO Take 1 Tab by mouth daily. senna-docusate 8.6-50 mg per tablet Commonly known as:  Euna Brill Take 1 Tab by mouth nightly. timolol 0.5 % ophthalmic solution Commonly known as:  TIMOPTIC Administer 1 Drop to right eye two (2) times a day. VITAMIN D3 1,000 unit Cap Generic drug:  cholecalciferol Take 4,000 Units by mouth daily. Patient Instructions Follow up with Dr. Melyssa Arnold as needed. Introducing hospitals & HEALTH SERVICES! Dear Sharath Perez: 
Thank you for requesting a [employer] account. Our records indicate that you have previously registered for a [employer] account but its currently inactive. Please call our [employer] support line at [phone]. Additional Information If you have questions, please visit the Frequently Asked Questions section of the [employer] website at [email or web address] Remember, [employer] is NOT to be used for urgent needs. For medical emergencies, dial 911. Now available from your iPhone and Android! Please provide this summary of care documentation to your next provider. Your primary care clinician is listed as Stephanie Tirado. If you have any questions after today's visit, please call [phone].

## 2017-03-07 NOTE — PROGRESS NOTES
Cardiac Electrophysiology Office Note     Subjective: Yara Rivera is a 80 y.o. patient who is seen for follow up   ILR recorder showed 30 seconds of atrial flutter for 30 second on February 23, 2017 at 13:39. She denies feeling the heart rate fast, palpitations. No SOB. No chest pain, lightheadedness or dizziness. The patient's son is with her he said that she's getting stronger over time. She does use a cane for walking but has not been falling recently    When I saw her in the past at [facility]:  She was seen for evaluation of syncope kindly referred by Dr. Suleman Mccracken.    PMHx includes chronic anemia, hypertension.    The patient reports she has had a total of 4 episodes of syncope, most recent one was yesterday, March 2016, 2010 and 2008.    The patient reports she was laying in bed, the next thing she remembers is waking up on floor at the foot of the bed. She was alert when she woke up, she cannot recall how she got on the floor. She felt \"wiped out\" and had some difficulty moving so she \"scooted\" on the floor the living room and used the IPAD to e-mail her son.    She came to [facility], MRI of head revealed suspect small acute posterior frontal centrum semiovale lacunar infarct. MRA of neck- normal  Echo shows normal LVEF, no RWMA. Mild TR and pulmonic valve regurgitation. Xray of spine showed T7 compression fracture of unknown age.       Prior to this she had a similar episode in March of this year, she was getting ready for bed and while walking to the bed she collapsed and lacerated her lip on the bookcase, this did require a hospital visit and sutures. Episode in 2010, similar except she did have associated dizziness prior to fainting. She went to [address] [place] [address] at this time and was told she had a TIA. Episode in 2008 as well.    She denies hx of MI/DVT/PE/DM. She is very independent at home. No issues ambulating.    No family hx of pacemaker.    Mother/father and brother CAD.      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 12 Tab 3    levETIRAcetam (KEPPRA) 250 mg tablet Take 1 Tab by mouth two (2) times a day. 180 Tab 1    metoprolol tartrate (LOPRESSOR) 50 mg tablet Take 1 Tab by mouth two (2) times a day. 180 Tab 1    amLODIPine (NORVASC) 5 mg tablet Take 1 Tab by mouth daily. 90 Tab 1    lisinopril (PRINIVIL, ZESTRIL) 5 mg tablet Take 1 Tab by mouth daily. 90 Tab 1    cholecalciferol (VITAMIN D3) 1,000 unit cap Take 4,000 Units by mouth daily.  acetaminophen (TYLENOL) 500 mg tablet Take 2 Tabs by mouth every six (6) hours. 100 Tab 0    aspirin 81 mg chewable tablet Take 1 Tab by mouth daily. 30 Tab 0    albuterol (PROAIR HFA) 90 mcg/actuation inhaler Take 2 Puffs by inhalation every four (4) hours as needed for Wheezing or Shortness of Breath. 1 Inhaler 4    atorvastatin (LIPITOR) 20 mg tablet TAKE ONE TABLET BY MOUTH EVERY DAY 30 Tab 11    omeprazole (PRILOSEC) 20 mg capsule TAKE ONE CAPSULE BY MOUTH ONCE DAILY ONE-HALF HOUR BEFORE A MEAL FOR S TOMACH ACID 30 Cap 11    CALCIUM CARBONATE (OYSTER SHELL CALCIUM 500 PO) Take 1 Tab by mouth daily.  ferrous sulfate 325 mg (65 mg iron) tablet Take 1 Tab by mouth two (2) times a day. Indications: IRON DEFICIENCY ANEMIA      latanoprost (XALATAN) 0.005 % ophthalmic solution Administer 1 Drop to both eyes nightly.  timolol (TIMOPTIC) 0.5 % ophthalmic solution Administer 1 Drop to right eye two (2) times a day.  L.  ACIDOPHILUS/STREPT/LA P-AZAEL (MICHAEL-Q PO) Take 1 Cap by mouth nightly.  senna-docusate (PERICOLACE) 8.6-50 mg per tablet Take 1 Tab by mouth nightly. (Patient taking differently: Take 1 Tab by mouth every fourty-eight (48) hours. ) 30 Tab 0     No Known Allergies  Past Medical History:   Diagnosis Date    Arthritis     Khadar lesion, chronic 5/8/2013    Seen on EGD 5/7/13    HEART (dyspnea on exertion) 6/12/2015    GERD (gastroesophageal reflux disease)     Hypercholesterolemia     Hypertension 9/22/2010    Ill-defined condition     Osteoporosis 9/22/2010     Past Surgical History:   Procedure Laterality Date    CARDIAC SURG PROCEDURE UNLIST  2008    nuclear stress test normal     ENDOSCOPY, COLON, DIAGNOSTIC  2008, 2013    HX CATARACT REMOVAL      HX GI  2013    egd   khadar ulcer    HX ORTHOPAEDIC  2010    knee replacement    IMPLANT  LOOP RECORDER  11/17/2016          Family History   Problem Relation Age of Onset    Heart Disease Mother     Heart Disease Father     Heart Disease Sister     Alzheimer Sister     Alcohol abuse Brother      Social History   Substance Use Topics    Smoking status: Former Smoker     Years: 30.00     Types: Cigarettes     Quit date: 9/21/2000    Smokeless tobacco: Never Used    Alcohol use No        Review of Systems:   Constitutional: Negative for fever, chills, weight loss, malaise/fatigue. HEENT: Negative for nosebleeds, vision changes. Respiratory: Negative for cough, hemoptysis  Cardiovascular: Negative for chest pain, palpitations, orthopnea, claudication, leg swelling, syncope, and PND. Gastrointestinal: Negative for nausea, vomiting, soft stools    Genitourinary: Negative for dysuria, and hematuria. Musculoskeletal: Negative for myalgias, arthralgia. Skin: Negative for rash. Heme: Does not bleed or bruise easily. Neurological: Negative for speech change and focal weakness     Objective:     Physical Exam:   Constitutional: well-nourished. No respiratory distress.    Head: Normocephalic and atraumatic. Eyes: Pupils are equal, round  ENT: hearing normal  Neck: supple. No JVD present. Cardiovascular: Normal rate, regular rhythm. Exam reveals no gallop and no friction rub. No murmur heard. Pulmonary/Chest: Effort normal and breath sounds normal. No wheezes. Abdominal: Soft, no tenderness. Musculoskeletal: no edema. Neurological: alert,oriented. walk in with a cane  Skin: Skin is warm and dry. Left sided ILR healed  Psychiatric: normal mood and affect. Behavior is normal. Judgment and thought content normal.         Assessment/Plan:       ICD-10-CM ICD-9-CM    1. Atrial flutter, paroxysmal (HCC) I48.92 427.32    2. Cerebrovascular accident (CVA), unspecified mechanism ([address] I63.9 434.91    3. HTN (hypertension), benign I10 401.1    4. Cardiac device in situ Z95.9 V45.00    5. Syncope, unspecified syncope type R55 780.2       Reviewed loop monitor results with the patient and her son. She was asymptomatic. Episode of atrial flutter 30 seconds. She seems to be getting stronger and is walking the cane minimally now, no recent falls. BP controlled. Discussed possibility of starting a [address] if he continues to have more episodes of atrial flutter or fibrillation. No pauses or severe bradycardia seen on device. Therefore there is no indication for pacemaker at this time. In the past we have discussed about the risk of fall if she was going to use anticoagulant and her son agrees that we need to continue aspirin at this time  I told him and her that my decisions about anticoagulant may change over time depends on the frequency and duration of atrial flutter  Thank you for involving me in this patient's care and please call with further concerns or questions. Zohreh Mejia M.D.   Electrophysiology/Cardiology  [facility]  [address] , [address]  [place] [id number]  [phone]                                        [phone]

## 2017-03-10 RX ORDER — GLUCOSAMINE SULFATE 1500 MG
4000 POWDER IN PACKET (EA) ORAL DAILY
Qty: 100 CAP | Refills: 4 | Status: SHIPPED | OUTPATIENT
Start: 2017-03-10 | End: 2017-12-04 | Stop reason: SDUPTHER

## 2017-03-10 RX ORDER — AMLODIPINE BESYLATE 5 MG/1
5 TABLET ORAL DAILY
Qty: 90 TAB | Refills: 1 | Status: SHIPPED | OUTPATIENT
Start: 2017-03-10 | End: 2017-09-04 | Stop reason: SDUPTHER

## 2017-03-10 RX ORDER — LISINOPRIL 5 MG/1
5 TABLET ORAL DAILY
Qty: 90 TAB | Refills: 1 | Status: SHIPPED | OUTPATIENT
Start: 2017-03-10 | End: 2017-09-04 | Stop reason: SDUPTHER

## 2017-04-13 ENCOUNTER — OFFICE VISIT (OUTPATIENT)
Dept: CARDIOLOGY CLINIC | Age: 82
End: 2017-04-13

## 2017-04-13 DIAGNOSIS — Z95.818 STATUS POST PLACEMENT OF IMPLANTABLE LOOP RECORDER: Primary | ICD-10-CM

## 2017-04-28 ENCOUNTER — OFFICE VISIT (OUTPATIENT)
Dept: INTERNAL MEDICINE CLINIC | Age: 82
End: 2017-04-28

## 2017-04-28 VITALS
OXYGEN SATURATION: 98 % | WEIGHT: 135 LBS | SYSTOLIC BLOOD PRESSURE: 137 MMHG | HEIGHT: 57 IN | HEART RATE: 54 BPM | RESPIRATION RATE: 16 BRPM | DIASTOLIC BLOOD PRESSURE: 60 MMHG | BODY MASS INDEX: 29.12 KG/M2

## 2017-04-28 DIAGNOSIS — Z00.00 ROUTINE GENERAL MEDICAL EXAMINATION AT A HEALTH CARE FACILITY: ICD-10-CM

## 2017-04-28 DIAGNOSIS — I63.9 CEREBROVASCULAR ACCIDENT (CVA), UNSPECIFIED MECHANISM (HCC): ICD-10-CM

## 2017-04-28 DIAGNOSIS — D50.0 IRON DEFICIENCY ANEMIA DUE TO CHRONIC BLOOD LOSS: ICD-10-CM

## 2017-04-28 DIAGNOSIS — E78.00 HYPERCHOLESTEREMIA: ICD-10-CM

## 2017-04-28 DIAGNOSIS — K59.1 FUNCTIONAL DIARRHEA: ICD-10-CM

## 2017-04-28 DIAGNOSIS — I10 HTN (HYPERTENSION), BENIGN: Primary | ICD-10-CM

## 2017-04-28 RX ORDER — OMEPRAZOLE 20 MG/1
CAPSULE, DELAYED RELEASE ORAL
Qty: 90 CAP | Refills: 1 | Status: SHIPPED | OUTPATIENT
Start: 2017-04-28 | End: 2017-12-10 | Stop reason: SDUPTHER

## 2017-04-28 RX ORDER — ATORVASTATIN CALCIUM 20 MG/1
TABLET, FILM COATED ORAL
Qty: 90 TAB | Refills: 1 | Status: SHIPPED | OUTPATIENT
Start: 2017-04-28 | End: 2017-12-04 | Stop reason: SDUPTHER

## 2017-04-28 NOTE — LETTER
7/7/2017 9:35 AM 
 
Ms. Bean Lawson [address] [place] [id number] Dear Bean Lawson: 
 
Please find your most recent results below. Resulted Orders CBC WITH AUTOMATED DIFF Result Value Ref Range WBC 7.7 3.4 - 10.8 x10E3/uL  
 RBC 4.61 3.77 - 5.28 x10E6/uL HGB 14.3 11.1 - 15.9 g/dL HCT 42.8 34.0 - 46.6 % MCV 93 79 - 97 fL  
 MCH 31.0 26.6 - 33.0 pg  
 MCHC 33.4 31.5 - 35.7 g/dL  
 RDW 15.8 (H) 12.3 - 15.4 % PLATELET 859 771 - 138 x10E3/uL NEUTROPHILS 66 % Lymphocytes 23 % MONOCYTES 8 % EOSINOPHILS 2 % BASOPHILS 1 %  
 ABS. NEUTROPHILS 5.1 1.4 - 7.0 x10E3/uL Abs Lymphocytes 1.8 0.7 - 3.1 x10E3/uL  
 ABS. MONOCYTES 0.6 0.1 - 0.9 x10E3/uL  
 ABS. EOSINOPHILS 0.1 0.0 - 0.4 x10E3/uL  
 ABS. BASOPHILS 0.1 0.0 - 0.2 x10E3/uL IMMATURE GRANULOCYTES 0 %  
 ABS. IMM. GRANS. 0.0 0.0 - 0.1 x10E3/uL Narrative Performed at:  [address]  [id number] : Florina Alarcon MD, Phone:  [phone] LIPID PANEL Result Value Ref Range Cholesterol, total 172 100 - 199 mg/dL Triglyceride 176 (H) 0 - 149 mg/dL HDL Cholesterol 66 >39 mg/dL VLDL, calculated 35 5 - 40 mg/dL LDL, calculated 71 0 - 99 mg/dL Narrative Performed at:  [address]  [id number] : Florina Alarcon MD, Phone:  [phone] METABOLIC PANEL, COMPREHENSIVE Result Value Ref Range Glucose 124 (H) 65 - 99 mg/dL BUN 12 8 - 27 mg/dL Creatinine 1.11 (H) 0.57 - 1.00 mg/dL GFR est non-AA 46 (L) >59 mL/min/1.73 GFR est AA 53 (L) >59 mL/min/1.73  
 BUN/Creatinine ratio 11 (L) 12 - 28 Sodium 135 134 - 144 mmol/L Potassium 4.7 3.5 - 5.2 mmol/L Chloride 96 96 - 106 mmol/L  
 CO2 21 18 - 29 mmol/L Calcium 9.6 8.7 - 10.3 mg/dL Protein, total 7.1 6.0 - 8.5 g/dL Albumin 4.5 3.5 - 4.7 g/dL GLOBULIN, TOTAL 2.6 1.5 - 4.5 g/dL A-G Ratio 1.7 1.2 - 2.2 Bilirubin, total 0.4 0.0 - 1.2 mg/dL Alk. phosphatase 86 39 - 117 IU/L  
 AST (SGOT) 18 0 - 40 IU/L  
 ALT (SGPT) 13 0 - 32 IU/L Narrative Performed at:  [address]  [id number] : Radha Jacques MD, Phone:  [phone] FERRITIN Result Value Ref Range Ferritin 120 15 - 150 ng/mL Narrative Performed at:  [address]  [id number] : Radha Jacques MD, Phone:  [phone] IRON PROFILE Result Value Ref Range TIBC 356 250 - 450 ug/dL UIBC 260 118 - 369 ug/dL Iron 96 27 - 139 ug/dL Iron % saturation 27 15 - 55 % Narrative Performed at:  [address]  [id number] : Radha Jacques MD, Phone:  [phone] CVD REPORT Result Value Ref Range INTERPRETATION NTAP   
 PDF IMAGE Not applicable Narrative Performed at:  [facility] [address] [place] [address]  [id number] : Sindhu Anthony PhD, Phone:  [phone] CKD REPORT Result Value Ref Range Interpretation Note Comment:  
   Supplement report is available. Narrative Performed at:  [facility] [address] [place] [address]  [id number] : Sindhu Anthony PhD, Phone:  [phone] RECOMMENDATIONS: 
Labs are excellent. Please call me if you have any questions: [phone] Sincerely, Colby Cedillo MD

## 2017-04-28 NOTE — MR AVS SNAPSHOT
Visit Information Date & Time Provider Department Dept. Phone Encounter #  
 4/28/2017 11:15 AM Parviz Rosales MD [facility] [phone] [id number] Your Appointments 7/5/2017 10:40 AM  
Follow Up with Cisco Lombardo MD  
[facility]) Appt Note: 6 mo F/U   KU 1/6  
 [address] [facility] [address] [place] [address] [facility] [address]  
[phone]  
  
   
 [address] [place] [address] [address] [facility] [address]  
  
    
 7/12/2017  3:15 PM  
ROUTINE CARE with Parviz Rosales MD  
[facility] App[facility] Note: Follow up visit [place] [address] [place] [address] [facility] [address]  
[address] [facility] [address] [address] Upcoming Health Maintenance Date Due ZOSTER VACCINE AGE 60> 5/23/1993 INFLUENZA AGE 9 TO ADULT 8/1/2016 MEDICARE YEARLY EXAM 12/9/2016 GLAUCOMA SCREENING Q2Y 6/9/2017 DTaP/Tdap/Td series (2 - Td) 3/31/2026 Allergies as of 4/28/2017  Review Complete On: 3/6/2017 By: Sandoval Larios MD  
 No Known Allergies Current Immunizations  Reviewed on 11/15/2016 Name Date Influenza High Dose Vaccine PF 10/13/2015 Influenza Vaccine 10/2/2013 Influenza Vaccine (Quad) PF 9/15/2014 Pneumococcal Conjugate (PCV-13) 11/2/2015 Pneumococcal Polysaccharide (PPSV-23) 9/23/2013 Tdap 3/31/2016  3:27 AM  
  
 Not reviewed this visit You Were Diagnosed With   
  
 Codes Comments HTN (hypertension), benign    -  Primary ICD-10-CM: I10 
ICD-9-CM: 401.1 Cerebrovascular accident (CVA), unspecified mechanism [facility]     ICD-10-CM: I63.9 ICD-9-CM: 434.91 Iron deficiency anemia due to chronic blood loss     ICD-10-CM: D50.0 ICD-9-CM: 280.0 Hypercholesteremia     ICD-10-CM: E78.00 ICD-9-CM: 272.0 Vitals BP Pulse Resp Height(growth percentile) Weight(growth percentile) SpO2 137/60 (!) 54 16 4' 9\" (1.448 m) 135 lb (61.2 kg) 98% BMI OB Status Smoking Status 29.21 kg/m2 Postmenopausal Former Smoker Vitals History BMI and BSA Data Body Mass Index Body Surface Area  
 29.21 kg/m 2 1.57 m 2 [phone] Your Updated Medication List  
  
   
This list is accurate as of: 4/28/17 12:08 PM.  Always use your most recent med list.  
  
  
  
  
 acetaminophen 500 mg tablet Commonly known as:  TYLENOL Take 2 Tabs by mouth every six (6) hours. albuterol 90 mcg/actuation inhaler Commonly known as:  PROAIR HFA Take 2 Puffs by inhalation every four (4) hours as needed for Wheezing or Shortness of Breath. alendronate 70 mg tablet Commonly known as:  FOSAMAX Take 1 Tab by mouth every seven (7) days. amLODIPine 5 mg tablet Commonly known as:  Roxana Ke Take 1 Tab by mouth daily. aspirin 81 mg chewable tablet Take 1 Tab by mouth daily. atorvastatin 20 mg tablet Commonly known as:  LIPITOR  
TAKE ONE TABLET BY MOUTH EVERY DAY  
  
 BENEFIBER SUGAR FREE (DEXTRIN) 3 gram/3.8 gram Powd Generic drug:  wheat dextrin Take  by mouth daily. cholecalciferol 1,000 unit Cap Commonly known as:  VITAMIN D3 Take 4 Caps by mouth daily. ferrous sulfate 325 mg (65 mg iron) tablet Take 1 Tab by mouth two (2) times a day. Indications: IRON DEFICIENCY ANEMIA MICHAEL-Q PO Take 1 Cap by mouth nightly. latanoprost 0.005 % ophthalmic solution Commonly known as:  Delsie [place] Administer 1 Drop to both eyes nightly. levETIRAcetam 250 mg tablet Commonly known as:  KEPPRA Take 1 Tab by mouth two (2) times a day. lisinopril 5 mg tablet Commonly known as:  Desirae Camel Take 1 Tab by mouth daily. metoprolol tartrate 50 mg tablet Commonly known as:  LOPRESSOR  
 Take 1 Tab by mouth two (2) times a day. omeprazole 20 mg capsule Commonly known as:  PRILOSEC  
TAKE ONE CAPSULE BY MOUTH ONCE DAILY ONE-HALF HOUR BEFORE A MEAL FOR S TOMACH ACID OYSTER SHELL CALCIUM 500 PO Take 1 Tab by mouth daily. timolol 0.5 % ophthalmic solution Commonly known as:  TIMOPTIC Administer 1 Drop to right eye two (2) times a day. Prescriptions Sent to Pharmacy Refills  
 atorvastatin (LIPITOR) 20 mg tablet 1 Sig: TAKE ONE TABLET BY MOUTH EVERY DAY Class: Normal  
 Pharmacy: [id number] [facility] [address] [facility] Ph #: [phone]  
 omeprazole (PRILOSEC) 20 mg capsule 1 Sig: TAKE ONE CAPSULE BY MOUTH ONCE DAILY ONE-HALF HOUR BEFORE A MEAL FOR S [address] ACID Class: Normal  
 Pharmacy: [id number] [facility] [address] [place] [address] Ph #: [phone] We Performed the Following CBC WITH AUTOMATED DIFF [95421 CPT(R)] FERRITIN [02138 CPT(R)] IRON PROFILE [id number] CPT(R)] LIPID PANEL [25123 CPT(R)] METABOLIC PANEL, COMPREHENSIVE [91169 CPT(R)] Introducing [facility]! Dear Vazquez Valdez: 
Thank you for requesting a [employer] account. Our records indicate that you have previously registered for a [employer] account but its currently inactive. Please call our [employer] support line at [phone]. Additional Information If you have questions, please visit the Frequently Asked Questions section of the [employer] website at [email or web address] Remember, [employer] is NOT to be used for urgent needs. For medical emergencies, dial 911. Now available from your iPhone and Android! Please provide this summary of care documentation to your next provider. Your primary care clinician is listed as Gonsalo Espinoza. If you have any questions after today's visit, please call [phone].

## 2017-04-29 NOTE — PROGRESS NOTES
Chief Complaint   Patient presents with    Follow-up     discuss iron levels      Subjective: Niraj Hsieh is a 80 y.o. RH femal        Patient Active Problem List    Diagnosis    Stroke ([facility]    HTN (hypertension), benign    Diastolic dysfunction    HEART (dyspnea on exertion)    COPD (chronic obstructive pulmonary disease) ([facility]    Khadar lesion, chronic     Seen on EGD 5/7/13      Iron deficiency anemia    Osteoporosis     Fosamax  2005  No fractures  In all these years      Hypercholesteremia       Denies gerd    Vitals:    04/28/17 1122   BP: 137/60   Pulse: (!) 54   Resp: 16   SpO2: 98%   Weight: 135 lb (61.2 kg)   Height: 4' 9\" (1.448 m)     S1 and S2 normal, no murmurs, clicks, gallops or rubs. Regular rate and rhythm. Chest is clear; no wheezes or rales. No edema or JVD. Neuro intact  Uses a cane for ambulation support and balance        [facility] was seen today for follow-up.     Diagnoses and all orders for this visit:    HTN (hypertension), benign  -     atorvastatin (LIPITOR) 20 mg tablet; TAKE ONE TABLET BY MOUTH EVERY DAY  -     omeprazole (PRILOSEC) 20 mg capsule; TAKE ONE CAPSULE BY MOUTH ONCE DAILY ONE-HALF HOUR BEFORE A MEAL FOR S TOMACH ACID  -     CBC WITH AUTOMATED DIFF  -     LIPID PANEL  -     METABOLIC PANEL, COMPREHENSIVE  -     FERRITIN  -     IRON PROFILE    Cerebrovascular accident (CVA), unspecified mechanism ([facility]  -     atorvastatin (LIPITOR) 20 mg tablet; TAKE ONE TABLET BY MOUTH EVERY DAY  -     omeprazole (PRILOSEC) 20 mg capsule; TAKE ONE CAPSULE BY MOUTH ONCE DAILY ONE-HALF HOUR BEFORE A MEAL FOR S TOMACH ACID  -     CBC WITH AUTOMATED DIFF  -     LIPID PANEL  -     METABOLIC PANEL, COMPREHENSIVE  -     FERRITIN  -     IRON PROFILE    Iron deficiency anemia due to chronic blood loss  -     atorvastatin (LIPITOR) 20 mg tablet; TAKE ONE TABLET BY MOUTH EVERY DAY  -     omeprazole (PRILOSEC) 20 mg capsule; TAKE ONE CAPSULE BY MOUTH ONCE DAILY ONE-HALF HOUR BEFORE A MEAL FOR S TOMACH ACID  -     CBC WITH AUTOMATED DIFF  -     LIPID PANEL  -     METABOLIC PANEL, COMPREHENSIVE  -     FERRITIN  -     IRON PROFILE    Hypercholesteremia  -     atorvastatin (LIPITOR) 20 mg tablet; TAKE ONE TABLET BY MOUTH EVERY DAY  -     omeprazole (PRILOSEC) 20 mg capsule; TAKE ONE CAPSULE BY MOUTH ONCE DAILY ONE-HALF HOUR BEFORE A MEAL FOR S TOMACH ACID  -     CBC WITH AUTOMATED DIFF  -     LIPID PANEL  -     METABOLIC PANEL, COMPREHENSIVE  -     FERRITIN  -     IRON PROFILE        This is a Subsequent Medicare Annual Wellness Visit providing Personalized Prevention Plan Services (PPPS) (Performed 12 months after initial AWV and PPPS )    I have reviewed the patient's medical history in detail and updated the computerized patient record. History     Past Medical History:   Diagnosis Date    Arthritis    Renella Hartshorn lesion, chronic 5/8/2013    Seen on EGD 5/7/13    HEART (dyspnea on exertion) 6/12/2015    GERD (gastroesophageal reflux disease)     Hypercholesterolemia     Hypertension 9/22/2010    Ill-defined condition     Osteoporosis 9/22/2010      Subjective: Melvin Waite is a 80 y.o. RH female initially and last seen on 11/15/16 while hospitalized at [facility]. [place] 11/15-19/16 for episode of LOC at home resulting in a compression fracture at T7 and tongue trauma, with 2 prior episodes of unexplained LOC, the last in April and associated with tongue trauma at that time as well. Additionally, MRI of the brain with tiny acute infarct in the right deep white matter. Exam revealed upgoing toe on the left, otherwise unremarkable. EEG was normal. Episodes of LOC with tongue trauma were concerning for seizure. No known seizure risk factors other than age. Family denied concerns about dementia. Recommended starting Keppra 250 mg po bid given high risk for injury with subsequent seizures. The stroke seen on MRI would not have been responsible for LOC or seizure. It is hard to know if the two are related.  She was started on aspirin 81 mg a day and recommended continued good control of her stroke risk factors. She was also seen by Cardiology and discharged with a loop recorder. (negative)  was in rehab at [address] [place] [address] until 12/1/16. Still going to therapy as an outpt. She has not had any subsequent episodes of LOC or unusual spells. She does not remember anything from her hospital stay and is repeating herself more. She is able to take her meds correctly everyday and is remembering her family and friends appropriately. Lives independently in a correction community. Cooks for herself  Follow up doing well now wioth no pain she is tolerating the fosamax    Loose bowels ? From meds or lactose  Past Surgical History:   Procedure Laterality Date    CARDIAC SURG PROCEDURE UNLIST  2008    nuclear stress test normal     ENDOSCOPY, COLON, DIAGNOSTIC  2008, 2013    HX CATARACT REMOVAL      HX GI  2013    egd   kia ulcer    HX ORTHOPAEDIC  2010    knee replacement    IMPLANT  LOOP RECORDER  11/17/2016          Current Outpatient Prescriptions   Medication Sig Dispense Refill    atorvastatin (LIPITOR) 20 mg tablet TAKE ONE TABLET BY MOUTH EVERY DAY 90 Tab 1    omeprazole (PRILOSEC) 20 mg capsule TAKE ONE CAPSULE BY MOUTH ONCE DAILY ONE-HALF HOUR BEFORE A MEAL FOR S TOMACH ACID 90 Cap 1    wheat dextrin (BENEFIBER SUGAR FREE, DEXTRIN,) 3 gram/3.8 gram powd Take  by mouth daily.  lisinopril (PRINIVIL, ZESTRIL) 5 mg tablet Take 1 Tab by mouth daily. 90 Tab 1    amLODIPine (NORVASC) 5 mg tablet Take 1 Tab by mouth daily. 90 Tab 1    cholecalciferol (VITAMIN D3) 1,000 unit cap Take 4 Caps by mouth daily. 100 Cap 4    alendronate (FOSAMAX) 70 mg tablet Take 1 Tab by mouth every seven (7) days. 12 Tab 3    levETIRAcetam (KEPPRA) 250 mg tablet Take 1 Tab by mouth two (2) times a day. 180 Tab 1    metoprolol tartrate (LOPRESSOR) 50 mg tablet Take 1 Tab by mouth two (2) times a day.  180 Tab 1    acetaminophen (TYLENOL) 500 mg tablet Take 2 Tabs by mouth every six (6) hours. 100 Tab 0    aspirin 81 mg chewable tablet Take 1 Tab by mouth daily. 30 Tab 0    albuterol (PROAIR HFA) 90 mcg/actuation inhaler Take 2 Puffs by inhalation every four (4) hours as needed for Wheezing or Shortness of Breath. 1 Inhaler 4    L. ACIDOPHILUS/STREPT/LA P-AZAEL (MICHAEL-Q PO) Take 1 Cap by mouth nightly.  CALCIUM CARBONATE (OYSTER SHELL CALCIUM 500 PO) Take 1 Tab by mouth daily.  ferrous sulfate 325 mg (65 mg iron) tablet Take 1 Tab by mouth two (2) times a day. Indications: IRON DEFICIENCY ANEMIA      latanoprost (XALATAN) 0.005 % ophthalmic solution Administer 1 Drop to both eyes nightly.  timolol (TIMOPTIC) 0.5 % ophthalmic solution Administer 1 Drop to right eye two (2) times a day. No Known Allergies  Family History   Problem Relation Age of Onset    Heart Disease Mother     Heart Disease Father     Heart Disease Sister     Alzheimer Sister     Alcohol abuse Brother      Social History   Substance Use Topics    Smoking status: Former Smoker     Years: 30.00     Types: Cigarettes     Quit date: 9/21/2000    Smokeless tobacco: Never Used    Alcohol use No     Patient Active Problem List   Diagnosis Code    Osteoporosis M81.0    Hypercholesteremia E78.00    Iron deficiency anemia D50.9    Khadar lesion, chronic K25.7    HEART (dyspnea on exertion) R06.09    COPD (chronic obstructive pulmonary disease) ([facility]) T31.1    Diastolic dysfunction Q63.6    HTN (hypertension), benign I10    Stroke ([facility] I63.9       Depression Risk Factor Screening:     PHQ 2 / 9, over the last two weeks 4/28/2017   Little interest or pleasure in doing things Not at all   Feeling down, depressed or hopeless Not at all   Total Score PHQ 2 0     Alcohol Risk Factor Screening:         Functional Ability and Level of Safety:     Hearing Loss   mild    Activities of Daily Living   Self-care.    Requires assistance with: no ADLs    Fall Risk     Fall Risk Assessment, last 12 mths 4/28/2017   Able to walk? Yes   Fall in past 12 months? No     Abuse Screen   Patient is not abused    Review of Systems   Pertinent items are noted in HPI. Physical Examination     Evaluation of Cognitive Function:  Mood/affect:  happy  Appearance: age appropriate  Family member/caregiver input: family helps a lot  No longer driving    Visit Vitals    /60    Pulse (!) 54    Resp 16    Ht 4' 9\" (1.448 m)    Wt 135 lb (61.2 kg)    SpO2 98%    BMI 29.21 kg/m2     General appearance: alert, cooperative, no distress, appears stated age  Lungs: clear to auscultation bilaterally  Heart: regular rate and rhythm, S1, S2 normal, no murmur, click, rub or gallop  Neurologic: Grossly normal    Patient Care Team:  Kika Hollingsworth MD as PCP - Tanesha Arvizu MD (Cardiology)  Jose Marshall MD as Physician (Neurology)    Advice/Referrals/Counseling   Education and counseling provided:  Are appropriate based on today's review and evaluation  End-of-Life planning (with patient's consent)  Pneumococcal Vaccine  Influenza Vaccine      Assessment/Plan   Bony Mccartney was seen today for follow-up.     Diagnoses and all orders for this visit:    HTN (hypertension), benign  -     atorvastatin (LIPITOR) 20 mg tablet; TAKE ONE TABLET BY MOUTH EVERY DAY  -     omeprazole (PRILOSEC) 20 mg capsule; TAKE ONE CAPSULE BY MOUTH ONCE DAILY ONE-HALF HOUR BEFORE A MEAL FOR S TOMACH ACID  -     CBC WITH AUTOMATED DIFF  -     LIPID PANEL  -     METABOLIC PANEL, COMPREHENSIVE  -     FERRITIN  -     IRON PROFILE    Cerebrovascular accident (CVA), unspecified mechanism ([facility]  -     atorvastatin (LIPITOR) 20 mg tablet; TAKE ONE TABLET BY MOUTH EVERY DAY  -     omeprazole (PRILOSEC) 20 mg capsule; TAKE ONE CAPSULE BY MOUTH ONCE DAILY ONE-HALF HOUR BEFORE A MEAL FOR S TOMACH ACID  -     CBC WITH AUTOMATED DIFF  -     LIPID PANEL  -     METABOLIC PANEL, COMPREHENSIVE  -     FERRITIN  -     IRON PROFILE    Iron deficiency anemia due to chronic blood loss  -     atorvastatin (LIPITOR) 20 mg tablet; TAKE ONE TABLET BY MOUTH EVERY DAY  -     omeprazole (PRILOSEC) 20 mg capsule; TAKE ONE CAPSULE BY MOUTH ONCE DAILY ONE-HALF HOUR BEFORE A MEAL FOR S TOMACH ACID  -     CBC WITH AUTOMATED DIFF  -     LIPID PANEL  -     METABOLIC PANEL, COMPREHENSIVE  -     FERRITIN  -     IRON PROFILE    Hypercholesteremia  -     atorvastatin (LIPITOR) 20 mg tablet; TAKE ONE TABLET BY MOUTH EVERY DAY  -     omeprazole (PRILOSEC) 20 mg capsule; TAKE ONE CAPSULE BY MOUTH ONCE DAILY ONE-HALF HOUR BEFORE A MEAL FOR S TOMACH ACID  -     CBC WITH AUTOMATED DIFF  -     LIPID PANEL  -     METABOLIC PANEL, COMPREHENSIVE  -     FERRITIN  -     IRON PROFILE    Routine general medical examination at a health care facility    . 1. Cerebrovascular accident (CVA), unspecified mechanism ([facility]  She has recovered  - atorvastatin (LIPITOR) 20 mg tablet; TAKE ONE TABLET BY MOUTH EVERY DAY  Dispense: 90 Tab; Refill: 1  - omeprazole (PRILOSEC) 20 mg capsule; TAKE ONE CAPSULE BY MOUTH ONCE DAILY ONE-HALF HOUR BEFORE A MEAL FOR S TOMACH ACID  Dispense: 90 Cap; Refill: 1  - wheat dextrin (BENEFIBER SUGAR FREE, DEXTRIN,) 3 gram/3.8 gram powd; Take  by mouth daily.  - CBC WITH AUTOMATED DIFF  - LIPID PANEL  - METABOLIC PANEL, COMPREHENSIVE  - FERRITIN  - IRON PROFILE    2. Iron deficiency anemia due to chronic blood loss  Will monitor with labs  - atorvastatin (LIPITOR) 20 mg tablet; TAKE ONE TABLET BY MOUTH EVERY DAY  Dispense: 90 Tab; Refill: 1  - omeprazole (PRILOSEC) 20 mg capsule; TAKE ONE CAPSULE BY MOUTH ONCE DAILY ONE-HALF HOUR BEFORE A MEAL FOR S TOMACH ACID  Dispense: 90 Cap; Refill: 1  - wheat dextrin (BENEFIBER SUGAR FREE, DEXTRIN,) 3 gram/3.8 gram powd;  Take  by mouth daily.  - CBC WITH AUTOMATED DIFF  - LIPID PANEL  - METABOLIC PANEL, COMPREHENSIVE  - FERRITIN  - IRON PROFILE    3. HTN (hypertension), benign  controlled  - atorvastatin (LIPITOR) 20 mg tablet; TAKE ONE TABLET BY MOUTH EVERY DAY  Dispense: 90 Tab; Refill: 1  - omeprazole (PRILOSEC) 20 mg capsule; TAKE ONE CAPSULE BY MOUTH ONCE DAILY ONE-HALF HOUR BEFORE A MEAL FOR S TOMACH ACID  Dispense: 90 Cap; Refill: 1  - wheat dextrin (BENEFIBER SUGAR FREE, DEXTRIN,) 3 gram/3.8 gram powd; Take  by mouth daily.  - CBC WITH AUTOMATED DIFF  - LIPID PANEL  - METABOLIC PANEL, COMPREHENSIVE  - FERRITIN  - IRON PROFILE    4. Hypercholesteremia  ok  - atorvastatin (LIPITOR) 20 mg tablet; TAKE ONE TABLET BY MOUTH EVERY DAY  Dispense: 90 Tab; Refill: 1  - omeprazole (PRILOSEC) 20 mg capsule; TAKE ONE CAPSULE BY MOUTH ONCE DAILY ONE-HALF HOUR BEFORE A MEAL FOR S TOMACH ACID  Dispense: 90 Cap; Refill: 1  - wheat dextrin (BENEFIBER SUGAR FREE, DEXTRIN,) 3 gram/3.8 gram powd; Take  by mouth daily.  - CBC WITH AUTOMATED DIFF  - LIPID PANEL  - METABOLIC PANEL, COMPREHENSIVE  - FERRITIN  - IRON PROFILE    5.  Routine general medical examination at a health care facility  ok    6 diarrhea add benefiber  And try lactaid

## 2017-05-08 ENCOUNTER — OFFICE VISIT (OUTPATIENT)
Dept: INTERNAL MEDICINE CLINIC | Age: 82
End: 2017-05-08

## 2017-05-08 VITALS
DIASTOLIC BLOOD PRESSURE: 60 MMHG | SYSTOLIC BLOOD PRESSURE: 128 MMHG | OXYGEN SATURATION: 96 % | HEART RATE: 71 BPM | TEMPERATURE: 97.7 F | WEIGHT: 135 LBS | BODY MASS INDEX: 29.12 KG/M2 | RESPIRATION RATE: 20 BRPM | HEIGHT: 57 IN

## 2017-05-08 DIAGNOSIS — R30.0 DYSURIA: Primary | ICD-10-CM

## 2017-05-08 RX ORDER — NITROFURANTOIN 25; 75 MG/1; MG/1
100 CAPSULE ORAL 2 TIMES DAILY
Qty: 10 CAP | Refills: 0 | Status: SHIPPED | OUTPATIENT
Start: 2017-05-08 | End: 2017-06-07 | Stop reason: ALTCHOICE

## 2017-05-08 NOTE — PATIENT INSTRUCTIONS
[employer] Activation    Thank you for requesting access to [employer]. Please follow the instructions below to securely access and download your online medical record. [employer] allows you to send messages to your doctor, view your test results, renew your prescriptions, schedule appointments, and more. How Do I Sign Up? 1. In your internet browser, go to [email or web address]  2. Click on the First Time User? Click Here link in the Sign In box. You will be redirect to the New Member Sign Up page. 3. Enter your [employer] Access Code exactly as it appears below. You will not need to use this code after youve completed the sign-up process. If you do not sign up before the expiration date, you must request a new code. [employer] Access Code: [email or web address]  Expires: 2017  3:41 PM (This is the date your [employer] access code will )    4. Enter the last four digits of your Social Security Number (xxxx) and Date of Birth (mm/dd/yyyy) as indicated and click Submit. You will be taken to the next sign-up page. 5. Create a [employer] ID. This will be your [employer] login ID and cannot be changed, so think of one that is secure and easy to remember. 6. Create a [employer] password. You can change your password at any time. 7. Enter your Password Reset Question and Answer. This can be used at a later time if you forget your password. 8. Enter your e-mail address. You will receive e-mail notification when new information is available in [address] 9. Click Sign Up. You can now view and download portions of your medical record. 10. Click the Download Summary menu link to download a portable copy of your medical information. Additional Information    If you have questions, please visit the Frequently Asked Questions section of the [employer] website at [email or web address] Remember, [employer] is NOT to be used for urgent needs. For medical emergencies, dial 911.

## 2017-05-08 NOTE — PROGRESS NOTES
Reviewed record in preparation for visit and have obtained necessary documentation. Identified pt with two pt identifiers(name and ). Health Maintenance Due   Topic    ZOSTER VACCINE AGE 60>     GLAUCOMA SCREENING Q2Y          No chief complaint on file. Wt Readings from Last 3 Encounters:   17 135 lb (61.2 kg)   17 135 lb (61.2 kg)   17 136 lb (61.7 kg)     Temp Readings from Last 3 Encounters:   17 96.1 °F (35.6 °C) (Oral)   16 97.7 °F (36.5 °C) (Oral)   16 97.5 °F (36.4 °C)     BP Readings from Last 3 Encounters:   17 137/60   17 110/70   17 119/62     Pulse Readings from Last 3 Encounters:   17 (!) 54   17 (!) 56   17 64           Learning Assessment:  :     Learning Assessment 2017   PRIMARY LEARNER Patient Patient Patient   PRIMARY LANGUAGE ENGLISH ENGLISH ENGLISH   LEARNER PREFERENCE PRIMARY LISTENING LISTENING DEMONSTRATION     - - LISTENING   ANSWERED BY patient self self   RELATIONSHIP SELF SELF SELF       Depression Screening:  :     PHQ over the last two weeks 2017   Little interest or pleasure in doing things Not at all   Feeling down, depressed or hopeless Not at all   Total Score PHQ 2 0       Fall Risk Assessment:  :     Fall Risk Assessment, last 12 mths 2017   Able to walk? Yes   Fall in past 12 months? No       Abuse Screening:  :     Abuse Screening Questionnaire 2017   Do you ever feel afraid of your partner? N   Are you in a relationship with someone who physically or mentally threatens you? N   Is it safe for you to go home?  Y       Coordination of Care Questionnaire:  :     1) Have you been to an emergency room, urgent care clinic since your last visit? no   Hospitalized since your last visit? no             2) Have you seen or consulted any other health care providers outside of [address] [place] [address] since your last visit? no  (Include any pap smears or colon screenings in this section.)    3) Do you have an Advance Directive on file? yes    4) Are you interested in receiving information on Advance Directives? NO      Patient is accompanied by self I have received verbal consent from Breanna Georges to discuss any/all medical information while they are present in the room.

## 2017-05-08 NOTE — PROGRESS NOTES
HISTORY OF PRESENT ILLNESS  Ambika Mohr is a 80 y.o. female. HPI  Patient presents to the office for evaluation of possible UTI. She reports she started with burning with urination yesterday. She is not sure if she is having true frequency or not because she has been drinking a lot of water. She denies having stomach pain. Review of Systems   Constitutional: Negative for chills and fever. Gastrointestinal: Negative for abdominal pain. Genitourinary: Positive for dysuria and frequency. Negative for flank pain, hematuria and urgency. Blood pressure 128/60, pulse 71, temperature 97.7 °F (36.5 °C), temperature source Oral, resp. rate 20, height 4' 9\" (1.448 m), weight 135 lb (61.2 kg), SpO2 96 %. Physical Exam   Constitutional: She appears well-developed and well-nourished. Abdominal: Soft. Bowel sounds are normal. There is no tenderness. ASSESSMENT and Griselda Book was seen today for urinary burning. Diagnoses and all orders for this visit:    Dysuria  -     nitrofurantoin, macrocrystal-monohydrate, (MACROBID) 100 mg capsule; Take 1 Cap by mouth two (2) times a day. patient not able to urinate in the office today. She has been given a container and will bring it back. She understands she should give a sample first before starting the antibiotic.

## 2017-05-09 ENCOUNTER — HOSPITAL ENCOUNTER (OUTPATIENT)
Dept: LAB | Age: 82
Discharge: HOME | End: 2017-05-09
Payer: MEDICARE

## 2017-05-09 LAB
BILIRUB UR QL STRIP: NEGATIVE
GLUCOSE UR-MCNC: NEGATIVE MG/DL
KETONES P FAST UR STRIP-MCNC: NEGATIVE MG/DL
PH UR STRIP: 7 [PH] (ref 4.6–8)
PROT UR QL STRIP: NEGATIVE MG/DL
SP GR UR STRIP: 1.02 (ref 1–1.03)
UA UROBILINOGEN AMB POC: NORMAL (ref 0.2–1)
URINALYSIS CLARITY POC: NORMAL
URINALYSIS COLOR POC: NORMAL
URINE BLOOD POC: NORMAL
URINE LEUKOCYTES POC: NORMAL
URINE NITRITES POC: NEGATIVE

## 2017-05-09 PROCEDURE — 87088 URINE BACTERIA CULTURE: CPT

## 2017-05-09 PROCEDURE — 87186 SC STD MICRODIL/AGAR DIL: CPT

## 2017-05-09 PROCEDURE — 87086 URINE CULTURE/COLONY COUNT: CPT

## 2017-05-13 LAB
BACTERIA UR CULT: ABNORMAL
BACTERIA UR CULT: ABNORMAL

## 2017-05-15 NOTE — PROGRESS NOTES
Please let the patient know her urine culture came back positive. The antibiotic should cover the infection.  thanks

## 2017-05-15 NOTE — PROGRESS NOTES
Writer contacted patient to inform of lab result per Demetrios Jeans, patient verbalized understanding and stated feeling better.

## 2017-05-30 ENCOUNTER — OFFICE VISIT (OUTPATIENT)
Dept: CARDIOLOGY CLINIC | Age: 82
End: 2017-05-30

## 2017-05-30 DIAGNOSIS — Z95.818 STATUS POST PLACEMENT OF IMPLANTABLE LOOP RECORDER: Primary | ICD-10-CM

## 2017-06-07 ENCOUNTER — HOSPITAL ENCOUNTER (OUTPATIENT)
Dept: GENERAL RADIOLOGY | Age: 82
Discharge: HOME | End: 2017-06-07
Attending: INTERNAL MEDICINE
Payer: MEDICARE

## 2017-06-07 ENCOUNTER — OFFICE VISIT (OUTPATIENT)
Dept: INTERNAL MEDICINE CLINIC | Age: 82
End: 2017-06-07

## 2017-06-07 VITALS
HEART RATE: 60 BPM | RESPIRATION RATE: 16 BRPM | BODY MASS INDEX: 29.12 KG/M2 | SYSTOLIC BLOOD PRESSURE: 124 MMHG | WEIGHT: 135 LBS | DIASTOLIC BLOOD PRESSURE: 50 MMHG | HEIGHT: 57 IN | OXYGEN SATURATION: 98 %

## 2017-06-07 DIAGNOSIS — M53.9 ACUTE LOW BACK PAIN DUE TO SPINAL DISORDER: Primary | ICD-10-CM

## 2017-06-07 DIAGNOSIS — S22.080A T12 COMPRESSION FRACTURE, INITIAL ENCOUNTER (HCC): ICD-10-CM

## 2017-06-07 DIAGNOSIS — M54.50 ACUTE LOW BACK PAIN DUE TO SPINAL DISORDER: Primary | ICD-10-CM

## 2017-06-07 DIAGNOSIS — M54.50 ACUTE LOW BACK PAIN DUE TO SPINAL DISORDER: ICD-10-CM

## 2017-06-07 DIAGNOSIS — M53.9 ACUTE LOW BACK PAIN DUE TO SPINAL DISORDER: ICD-10-CM

## 2017-06-07 PROCEDURE — 72100 X-RAY EXAM L-S SPINE 2/3 VWS: CPT

## 2017-06-07 RX ORDER — OXYCODONE HYDROCHLORIDE 5 MG/1
5 TABLET ORAL
COMMUNITY
End: 2017-09-14 | Stop reason: ALTCHOICE

## 2017-06-07 NOTE — MR AVS SNAPSHOT
Visit Information Date & Time Provider Department Dept. Phone Encounter #  
 6/7/2017  3:45 PM Claudia Diaz, [address] [facility] [phone] [id number] Your Appointments 7/5/2017 10:40 AM  
Follow Up with Joselito Herron MD  
[facility] at [facility] [address] [facility]) Appt Note: 6 mo F/U   KU 1/6  
 [address] [facility] [address]  
[phone]  
  
   
 [address] [facility] [address]  
  
    
 7/12/2017  3:15 PM  
ROUTINE CARE with Claudia Diaz MD  
[facility] [address] [facility]) Appt Note: Follow up visit [place] [address] [facility] [address]  
[address] [facility] [address] [facility] [address] [address] Upcoming Health Maintenance Date Due ZOSTER VACCINE AGE 60> 5/23/1993 GLAUCOMA SCREENING Q2Y 6/9/2017 INFLUENZA AGE 9 TO ADULT 8/1/2017 MEDICARE YEARLY EXAM 4/29/2018 DTaP/Tdap/Td series (2 - Td) 3/31/2026 Allergies as of 6/7/2017  Review Complete On: 6/7/2017 By: Jacklyn Ly LPN No Known Allergies Current Immunizations  Reviewed on 11/15/2016 Name Date Influenza High Dose Vaccine PF 10/13/2015 Influenza Vaccine 10/2/2013 Influenza Vaccine (Quad) PF 9/15/2014 Pneumococcal Conjugate (PCV-13) 11/2/2015 Pneumococcal Polysaccharide (PPSV-23) 9/23/2013 Tdap 3/31/2016  3:27 AM  
  
 Not reviewed this visit You Were Diagnosed With   
  
 Codes Comments Acute low back pain due to spinal disorder    -  Primary ICD-10-CM: M54.5 ICD-9-CM: 724.2 Vitals BP Pulse Resp Height(growth percentile) Weight(growth percentile) SpO2  
 124/50 60 16 4' 9\" (1.448 m) 135 lb (61.2 kg) 98% BMI OB Status Smoking Status 29.21 kg/m2 Postmenopausal Former Smoker BMI and BSA Data  Body Mass Index Body Surface Area  
 29.21 kg/m 2 1.57 m 2  
  
  
 Preferred Pharmacy Pharmacy Name Phone [address] [address] [phone] Your Updated Medication List  
  
   
This list is accurate as of: 6/7/17  4:22 PM.  Always use your most recent med list.  
  
  
  
  
 acetaminophen 500 mg tablet Commonly known as:  TYLENOL Take 2 Tabs by mouth every six (6) hours. albuterol 90 mcg/actuation inhaler Commonly known as:  PROAIR HFA Take 2 Puffs by inhalation every four (4) hours as needed for Wheezing or Shortness of Breath. alendronate 70 mg tablet Commonly known as:  FOSAMAX Take 1 Tab by mouth every seven (7) days. amLODIPine 5 mg tablet Commonly known as:  Dyan Million Take 1 Tab by mouth daily. aspirin 81 mg chewable tablet Take 1 Tab by mouth daily. atorvastatin 20 mg tablet Commonly known as:  LIPITOR  
TAKE ONE TABLET BY MOUTH EVERY DAY  
  
 BENEFIBER SUGAR FREE (DEXTRIN) 3 gram/3.8 gram Powd Generic drug:  wheat dextrin Take  by mouth daily. cholecalciferol 1,000 unit Cap Commonly known as:  VITAMIN D3 Take 4 Caps by mouth daily. ferrous sulfate 325 mg (65 mg iron) tablet Take 1 Tab by mouth two (2) times a day. Indications: IRON DEFICIENCY ANEMIA MICHAEL-Q PO Take 1 Cap by mouth nightly. latanoprost 0.005 % ophthalmic solution Commonly known as:  Ev Lathakeem Administer 1 Drop to both eyes nightly. lisinopril 5 mg tablet Commonly known as:  Dallin Irvin Take 1 Tab by mouth daily. metoprolol tartrate 50 mg tablet Commonly known as:  LOPRESSOR Take 1 Tab by mouth two (2) times a day. omeprazole 20 mg capsule Commonly known as:  PRILOSEC  
TAKE ONE CAPSULE BY MOUTH ONCE DAILY ONE-HALF HOUR BEFORE A MEAL FOR S TOMACH ACID  
  
 oxyCODONE IR 5 mg immediate release tablet Commonly known as:  Artemio Almanzar Take 5 mg by mouth every eight (8) hours as needed for Pain.   
  
 OYSTER SHELL CALCIUM 500 PO  
 Take 1 Tab by mouth daily. SENNA-DOCUSATE SODIUM PO Take  by mouth. timolol 0.5 % ophthalmic solution Commonly known as:  TIMOPTIC Administer 1 Drop to right eye two (2) times a day. To-Do List   
 06/07/2017 Imaging:  XR SPINE LUMB 2 OR 3 V Introducing [facility]! [facility] introduces [employer] patient portal. Now you can access parts of your medical record, email your doctor's office, and request medication refills online. 1. In your internet browser, go to [email or web address] 2. Click on the First Time User? Click Here link in the Sign In box. You will see the New Member Sign Up page. 3. Enter your [employer] Access Code exactly as it appears below. You will not need to use this code after youve completed the sign-up process. If you do not sign up before the expiration date, you must request a new code. · [employer] Access Code: [email or web address] Expires: 8/6/2017  3:41 PM 
 
4. Enter the last four digits of your Social Security Number (xxxx) and Date of Birth (mm/dd/yyyy) as indicated and click Submit. You will be taken to the next sign-up page. 5. Create a [employer] ID. This will be your [employer] login ID and cannot be changed, so think of one that is secure and easy to remember. 6. Create a [employer] password. You can change your password at any time. 7. Enter your Password Reset Question and Answer. This can be used at a later time if you forget your password. 8. Enter your e-mail address. You will receive e-mail notification when new information is available in [address] 9. Click Sign Up. You can now view and download portions of your medical record. 10. Click the Download Summary menu link to download a portable copy of your medical information. If you have questions, please visit the Frequently Asked Questions section of the [employer] website. Remember, [employer] is NOT to be used for urgent needs. For medical emergencies, dial 911. Now available from your iPhone and Android! Please provide this summary of care documentation to your next provider. Your primary care clinician is listed as Irais Cha. If you have any questions after today's visit, please call [phone].

## 2017-06-07 NOTE — PROGRESS NOTES
Chief Complaint   Patient presents with    Back Pain     pt picked up an heavy object last tuesday taking otc tylenol     Felt severe pain lifting heavy freezer tray 6 days ago non radicular  using tylenol and 5 mg oxycodone  \  Had compression T7 in November  And now on fosamax      In pain  Vitals:    06/07/17 1559   BP: 124/50   Pulse: 60   Resp: 16   SpO2: 98%   Weight: 135 lb (61.2 kg)   Height: 4' 9\" (1.448 m)       Point tender l3  L$    Straight leg raising test is  Neg  Using walker for ambulation        [facility] was seen today for back pain. Diagnoses and all orders for this visit:    Acute low back pain due to spinal disorder  -     XR SPINE LUMB 2 OR 3 V; Future      Use and tylenol and as needed oxycodone     XR Results (most recent):    Results from Hospital Encounter encounter on 06/07/17   XR SPINE LUMB 2 OR 3 V   Narrative EXAM:  XR SPINE LUMB 2 OR 3 V  INDICATION:   pain  COMPARISON: MRI of the thoracic spine, 11/15/2016  . FINDINGS:   AP, lateral and spot lateral views of the lumbar spine demonstrate compression  of T12 with approximately 50% loss of vertebral body height. Schmorl's node with  minimal superior endplate compression of L3. Grade 1 anterior listhesis of L4 on  L5. Degenerative changes in the posterior elements about L4 and L5. Diffuse  osteopenia. Calcifications in the abdominal aorta. .       Impression IMPRESSION:    1. Compression of T12 with approximately 50% loss of vertebral body height, age  indeterminate. This is new since November, 2016.  2. Grade 1 anterolisthesis of L4 on L5. Zayda Philip was seen today for back pain.     Diagnoses and all orders for this visit:    Acute low back pain due to spinal disorder  -     XR SPINE LUMB 2 OR 3 V; Future  -     MRI [facility] SPINE WO CONT; Future    T12 compression fracture, initial encounter ([address]  -     MRI [facility] SPINE WO CONT; Future

## 2017-06-08 ENCOUNTER — TELEPHONE (OUTPATIENT)
Dept: INTERNAL MEDICINE CLINIC | Age: 82
End: 2017-06-08

## 2017-06-08 NOTE — TELEPHONE ENCOUNTER
Writer gave message to radiology department stating that Dr Marcie Mcallister did receive patients imaging reports.

## 2017-06-08 NOTE — PROGRESS NOTES
Writer spoke with patient and informed her of xray results and informed her that Dr Prachi Martinez ordered MRI, patient expressed verbal understanding and states she would like for us to call her daughter. Writer left detailed message  On voicemail with Whitney Blanco (daughter).  Also provided  departemnt number to schedule MRI

## 2017-06-09 ENCOUNTER — HOSPITAL ENCOUNTER (OUTPATIENT)
Dept: MRI IMAGING | Age: 82
Discharge: HOME | End: 2017-06-09
Attending: INTERNAL MEDICINE
Payer: MEDICARE

## 2017-06-09 DIAGNOSIS — S22.080A T12 COMPRESSION FRACTURE, INITIAL ENCOUNTER (HCC): ICD-10-CM

## 2017-06-09 DIAGNOSIS — M54.50 ACUTE LOW BACK PAIN DUE TO SPINAL DISORDER: ICD-10-CM

## 2017-06-09 DIAGNOSIS — M53.9 ACUTE LOW BACK PAIN DUE TO SPINAL DISORDER: ICD-10-CM

## 2017-06-09 PROCEDURE — 72146 MRI CHEST SPINE W/O DYE: CPT

## 2017-06-14 ENCOUNTER — TELEPHONE (OUTPATIENT)
Dept: INTERNAL MEDICINE CLINIC | Age: 82
End: 2017-06-14

## 2017-06-14 NOTE — TELEPHONE ENCOUNTER
Pt called a bout rob Storm that the patient would benefit from it was seen at [address] a script to get for the pt     CB # [phone]

## 2017-06-14 NOTE — PROGRESS NOTES
Spoke with the patient using 2 identifiers. Notified her per Dr Taylor Fine that there is a new acute fracture noted at T12. She states that she slept for 5 to 6 hours last night. She is managing the pain by taking Oxycodone at 5 hours intervals alternating with Tylenol as needed. She feels slightly somewhat stable. Informed her per Dr Taylor Fine that she is a candidate for kyphoplasty. Educated the patient in great deal about the kyphoplasty procedure. She wrote everything down and will share the information with [facility]. Told the patient that kyphoplasty is only needed if she is having severe pain. She will think everything over.

## 2017-06-14 NOTE — TELEPHONE ENCOUNTER
Pt would like a return phone call from the nurse to discuss the results of the MRI that was done on 6/9/17.  Pt can be reached at [phone] or e-mail the results to [email or web address]              From answering service

## 2017-06-16 ENCOUNTER — OFFICE VISIT (OUTPATIENT)
Dept: CARDIOLOGY CLINIC | Age: 82
End: 2017-06-16

## 2017-06-16 DIAGNOSIS — Z95.818 STATUS POST PLACEMENT OF IMPLANTABLE LOOP RECORDER: Primary | ICD-10-CM

## 2017-07-05 ENCOUNTER — HOSPITAL ENCOUNTER (OUTPATIENT)
Dept: LAB | Age: 82
Discharge: HOME | End: 2017-07-05
Payer: MEDICARE

## 2017-07-05 ENCOUNTER — OFFICE VISIT (OUTPATIENT)
Dept: NEUROLOGY | Age: 82
End: 2017-07-05

## 2017-07-05 VITALS
WEIGHT: 132 LBS | OXYGEN SATURATION: 97 % | SYSTOLIC BLOOD PRESSURE: 110 MMHG | BODY MASS INDEX: 28.48 KG/M2 | RESPIRATION RATE: 18 BRPM | HEART RATE: 53 BPM | HEIGHT: 57 IN | DIASTOLIC BLOOD PRESSURE: 66 MMHG

## 2017-07-05 DIAGNOSIS — R56.9 SEIZURES (HCC): Primary | ICD-10-CM

## 2017-07-05 PROCEDURE — 83550 IRON BINDING TEST: CPT

## 2017-07-05 PROCEDURE — 82728 ASSAY OF FERRITIN: CPT

## 2017-07-05 PROCEDURE — 80053 COMPREHEN METABOLIC PANEL: CPT

## 2017-07-05 PROCEDURE — 85025 COMPLETE CBC W/AUTO DIFF WBC: CPT

## 2017-07-05 PROCEDURE — 36415 COLL VENOUS BLD VENIPUNCTURE: CPT

## 2017-07-05 PROCEDURE — 80061 LIPID PANEL: CPT

## 2017-07-05 RX ORDER — LEVETIRACETAM 250 MG/1
250 TABLET ORAL 2 TIMES DAILY
COMMUNITY
Start: 2017-06-12 | End: 2018-01-05 | Stop reason: SDUPTHER

## 2017-07-05 NOTE — PATIENT INSTRUCTIONS
10 [facility]   Statement to Patients  April 1, 2014      In an effort to ensure the large volume of patient prescription refills is processed in the most efficient and expeditious manner, we are asking our patients to assist us by calling your Pharmacy for all prescription refills, this will include also your  Mail Order Pharmacy. The pharmacy will contact our office electronically to continue the refill process. Please do not wait until the last minute to call your pharmacy. We need at least 48 hours (2days) to fill prescriptions. We also encourage you to call your pharmacy before going to  your prescription to make sure it is ready. With regard to controlled substance prescription refill requests (narcotic refills) that need to be picked up at our office, we ask your cooperation by providing us with at least 72 hours (3days) notice that you will need a refill. We will not refill narcotic prescription refill requests after 4:00pm on any weekday, Monday through Thursday, or after 2:00pm on Fridays, or on the weekends. We encourage everyone to explore another way of getting your prescription refill request processed using [employer], our patient web portal through our electronic medical record system. Urge is an efficient and effective way to communicate your medication request directly to the office and  downloadable as an deshawn on your smart phone . Urge also features a review functionality that allows you to view your medication list as well as leave messages for your physician. Are you ready to get connected? If so please review the attatched instructions or speak to any of our staff to get you set up right away! Thank you so much for your cooperation. Should you have any questions please contact our Practice Administrator.     The Physicians and Staff,  [facility]             Please bring all medication bottles, including vitamins, supplements and any over-the-counter medications, to your next office visit.

## 2017-07-05 NOTE — MR AVS SNAPSHOT
Visit Information Date & Time Provider Department Dept. Phone Encounter #  
 7/5/2017 10:40 AM Tanya Vidal MD [facility] at [address] [id number] Follow-up Instructions Return in about 6 months (around 1/5/2018). Your Appointments 7/12/2017  3:15 PM  
ROUTINE CARE with Claudette Parra MD  
[facility]) Appt Note: Follow up visit [place] [address] [facility] [address]  
[facility] [address] [facility] [address] [facility] [place] [place] [address] Upcoming Health Maintenance Date Due ZOSTER VACCINE AGE 60> 5/23/1993 GLAUCOMA SCREENING Q2Y 6/9/2017 INFLUENZA AGE 9 TO ADULT 8/1/2017 MEDICARE YEARLY EXAM 4/29/2018 DTaP/Tdap/Td series (2 - Td) 3/31/2026 Allergies as of 7/5/2017  Review Complete On: 7/5/2017 By: Fermin Fortune LPN No Known Allergies Current Immunizations  Reviewed on 11/15/2016 Name Date Influenza High Dose Vaccine PF 10/13/2015 Influenza Vaccine 10/2/2013 Influenza Vaccine (Quad) PF 9/15/2014 Pneumococcal Conjugate (PCV-13) 11/2/2015 Pneumococcal Polysaccharide (PPSV-23) 9/23/2013 Tdap 3/31/2016  3:27 AM  
  
 Not reviewed this visit Vitals BP Pulse Resp Height(growth percentile) Weight(growth percentile) SpO2  
 110/66 (!) 53 18 4' 9\" (1.448 m) 132 lb (59.9 kg) 97% BMI OB Status Smoking Status 28.56 kg/m2 Postmenopausal Former Smoker Vitals History BMI and BSA Data Body Mass Index Body Surface Area 28.56 kg/m 2 1.55 m 2 Preferred Pharmacy Pharmacy Name Phone [address] [address] [phone] Your Updated Medication List  
  
   
This list is accurate as of: 7/5/17 11:13 AM.  Always use your most recent med list.  
  
  
  
  
 acetaminophen 500 mg tablet Commonly known as:  TYLENOL  
 Take 2 Tabs by mouth every six (6) hours. albuterol 90 mcg/actuation inhaler Commonly known as:  PROAIR HFA Take 2 Puffs by inhalation every four (4) hours as needed for Wheezing or Shortness of Breath. alendronate 70 mg tablet Commonly known as:  FOSAMAX Take 1 Tab by mouth every seven (7) days. amLODIPine 5 mg tablet Commonly known as:  Feder[place] Take 1 Tab by mouth daily. aspirin 81 mg chewable tablet Take 1 Tab by mouth daily. atorvastatin 20 mg tablet Commonly known as:  LIPITOR  
TAKE ONE TABLET BY MOUTH EVERY DAY  
  
 BENEFIBER SUGAR FREE (DEXTRIN) 3 gram/3.8 gram Powd Generic drug:  wheat dextrin Take  by mouth daily. cholecalciferol 1,000 unit Cap Commonly known as:  VITAMIN D3 Take 4 Caps by mouth daily. ferrous sulfate 325 mg (65 mg iron) tablet Take 1 Tab by mouth two (2) times a day. Indications: IRON DEFICIENCY ANEMIA MICHAEL-Q PO Take 1 Cap by mouth nightly. latanoprost 0.005 % ophthalmic solution Commonly known as:  Cassidy Cam Administer 1 Drop to both eyes nightly. levETIRAcetam 250 mg tablet Commonly known as:  KEPPRA Take 250 mg by mouth two (2) times a day. lisinopril 5 mg tablet Commonly known as:  Mattie Husbands Take 1 Tab by mouth daily. metoprolol tartrate 50 mg tablet Commonly known as:  LOPRESSOR  
TAKE ONE TABLET BY MOUTH 2 TIMES A DAY  
  
 omeprazole 20 mg capsule Commonly known as:  PRILOSEC  
TAKE ONE CAPSULE BY MOUTH ONCE DAILY ONE-HALF HOUR BEFORE A MEAL FOR S TOMACH ACID  
  
 oxyCODONE IR 5 mg immediate release tablet Commonly known as:  Emerson Jo Take 5 mg by mouth every eight (8) hours as needed for Pain. OYSTER SHELL CALCIUM 500 PO Take 1 Tab by mouth daily. SENNA-DOCUSATE SODIUM PO Take  by mouth. timolol 0.5 % ophthalmic solution Commonly known as:  TIMOPTIC Administer 1 Drop to right eye two (2) times a day. Follow-up Instructions Return in about 6 months (around 1/5/2018). Patient Instructions PRESCRIPTION REFILL POLICY [facility] Statement to Patients April 1, 2014 In an effort to ensure the large volume of patient prescription refills is processed in the most efficient and expeditious manner, we are asking our patients to assist us by calling your Pharmacy for all prescription refills, this will include also your  Mail Order Pharmacy. The pharmacy will contact our office electronically to continue the refill process. Please do not wait until the last minute to call your pharmacy. We need at least 48 hours (2days) to fill prescriptions. We also encourage you to call your pharmacy before going to  your prescription to make sure it is ready. With regard to controlled substance prescription refill requests (narcotic refills) that need to be picked up at our office, we ask your cooperation by providing us with at least 72 hours (3days) notice that you will need a refill. We will not refill narcotic prescription refill requests after 4:00pm on any weekday, Monday through Thursday, or after 2:00pm on Fridays, or on the weekends. We encourage everyone to explore another way of getting your prescription refill request processed using [employer], our patient web portal through our electronic medical record system. S[employer] is an efficient and effective way to communicate your medication request directly to the office and  downloadable as an deshawn on your smart phone . [employer] also features a review functionality that allows you to view your medication list as well as leave messages for your physician. Are you ready to get connected? If so please review the attatched instructions or speak to any of our staff to get you set up right away! Thank you so much for your cooperation. Should you have any questions please contact our Practice Administrator. The Physicians and Staff,  [facility] Please bring all medication bottles, including vitamins, supplements and any over-the-counter medications, to your next office visit. Introducing [facility]! [employer] introduces [employer] patient portal. Now you can access parts of your medical record, email your doctor's office, and request medication refills online. 1. In your internet browser, go to [email or web address] 2. Click on the First Time User? Click Here link in the Sign In box. You will see the New Member Sign Up page. 3. Enter your [employer] Access Code exactly as it appears below. You will not need to use this code after youve completed the sign-up process. If you do not sign up before the expiration date, you must request a new code. · [employer] Access Code: [email or web address] Expires: 8/6/2017  3:41 PM 
 
4. Enter the last four digits of your Social Security Number (xxxx) and Date of Birth (mm/dd/yyyy) as indicated and click Submit. You will be taken to the next sign-up page. 5. Create a [employer] ID. This will be your [employer] login ID and cannot be changed, so think of one that is secure and easy to remember. 6. Create a [employer] password. You can change your password at any time. 7. Enter your Password Reset Question and Answer. This can be used at a later time if you forget your password. 8. Enter your e-mail address. You will receive e-mail notification when new information is available in [address] 9. Click Sign Up. You can now view and download portions of your medical record. 10. Click the Download Summary menu link to download a portable copy of your medical information. If you have questions, please visit the Frequently Asked Questions section of the [employer] website. Remember, [employer] is NOT to be used for urgent needs. For medical emergencies, dial 911. Now available from your iPhone and Android! Please provide this summary of care documentation to your next provider. Your primary care clinician is listed as Jeffery Galarza. If you have any questions after today's visit, please call [phone].

## 2017-07-05 NOTE — PROGRESS NOTES
Neurology Progress Note    HISTORY PROVIDED BY: patient and DIL    Chief Complaint:   Chief Complaint   Patient presents with    Seizure     f/u      Subjective:   Pt is an 80 y.o. RH female last seen in clinic on 1/5/17 in f/u for epilepsy. She was hospitalized at [facility] 11/15-19/16 for episode of LOC at home resulting in a compression fracture at T7 and tongue trauma, with 2 prior episodes of unexplained LOC, the last in April, 2016 and associated with tongue trauma at that time as well. Additionally, MRI of the brain with tiny acute infarct in the right deep white matter. EEG was normal. Started on Keppra 250mg bid empirically for seizures without episode of LOC since discharge. Exam with MMSE score of 30/30, and was non-focal.  Continue to monitor for memory loss. Continued Keppra 250mg bid. She returns for planned f/u accompanied by her DIL. Since last visit she has had a T11 compression fracture after lifting a heavy item. Not driving, sold her car. She reports a \"bad day\" last week, just could not \"get herself together\" while making breakfast. She felt confused, no IMAN or LOC. Has difficulty stating exactly what trouble she had. She went for a walk, took a shower, and then a nap. Felt better later. No illness, no sleep deprivation. Did not seek medical attention or mention this to family until today. No spells of IMAN/LOC since last visit. Still taking Keppra 250mg bid.      Past Medical History:   Diagnosis Date    Arthritis    Leslie Babinski lesion, chronic 5/8/2013    Seen on EGD 5/7/13    HEART (dyspnea on exertion) 6/12/2015    GERD (gastroesophageal reflux disease)     Hypercholesterolemia     Hypertension 9/22/2010    Ill-defined condition     Osteoporosis 9/22/2010      Past Surgical History:   Procedure Laterality Date    CARDIAC SURG PROCEDURE UNLIST  2008    nuclear stress test normal     ENDOSCOPY, COLON, DIAGNOSTIC  2008, 2013    HX CATARACT REMOVAL      HX GI  2013    egd kia ulcer    HX ORTHOPAEDIC  2010    knee replacement    IMPLANT  LOOP RECORDER  11/17/2016           Social History     Social History    Marital status: SINGLE     Spouse name: N/A    Number of children: N/A    Years of education: N/A     Occupational History    Not on file. Social History Main Topics    Smoking status: Former Smoker     Years: 30.00     Types: Cigarettes     Quit date: 9/21/2000    Smokeless tobacco: Never Used    Alcohol use No    Drug use: No    Sexual activity: Not Currently     Other Topics Concern    Not on file     Social History Narrative     Family History   Problem Relation Age of Onset    Heart Disease Mother     Heart Disease Father     Heart Disease Sister     Alzheimer Sister     Alcohol abuse Brother           Objective:   ROS:  Per HPI-  Otherwise 10 point ROS was negative    No Known Allergies    Meds:  Outpatient Medications Prior to Visit   Medication Sig Dispense Refill    metoprolol tartrate (LOPRESSOR) 50 mg tablet TAKE ONE TABLET BY MOUTH 2 TIMES A  Tab 22    atorvastatin (LIPITOR) 20 mg tablet TAKE ONE TABLET BY MOUTH EVERY DAY 90 Tab 1    omeprazole (PRILOSEC) 20 mg capsule TAKE ONE CAPSULE BY MOUTH ONCE DAILY ONE-HALF HOUR BEFORE A MEAL FOR S TOMACH ACID 90 Cap 1    wheat dextrin (BENEFIBER SUGAR FREE, DEXTRIN,) 3 gram/3.8 gram powd Take  by mouth daily.  lisinopril (PRINIVIL, ZESTRIL) 5 mg tablet Take 1 Tab by mouth daily. 90 Tab 1    amLODIPine (NORVASC) 5 mg tablet Take 1 Tab by mouth daily. 90 Tab 1    cholecalciferol (VITAMIN D3) 1,000 unit cap Take 4 Caps by mouth daily. 100 Cap 4    alendronate (FOSAMAX) 70 mg tablet Take 1 Tab by mouth every seven (7) days. 12 Tab 3    acetaminophen (TYLENOL) 500 mg tablet Take 2 Tabs by mouth every six (6) hours. 100 Tab 0    aspirin 81 mg chewable tablet Take 1 Tab by mouth daily.  30 Tab 0    albuterol (PROAIR HFA) 90 mcg/actuation inhaler Take 2 Puffs by inhalation every four (4) hours as needed for Wheezing or Shortness of Breath. 1 Inhaler 4    CALCIUM CARBONATE (OYSTER SHELL CALCIUM 500 PO) Take 1 Tab by mouth daily.  ferrous sulfate 325 mg (65 mg iron) tablet Take 1 Tab by mouth two (2) times a day. Indications: IRON DEFICIENCY ANEMIA      latanoprost (XALATAN) 0.005 % ophthalmic solution Administer 1 Drop to both eyes nightly.  timolol (TIMOPTIC) 0.5 % ophthalmic solution Administer 1 Drop to right eye two (2) times a day.  oxyCODONE IR (ROXICODONE) 5 mg immediate release tablet Take 5 mg by mouth every eight (8) hours as needed for Pain.  SENNA-DOCUSATE SODIUM PO Take  by mouth.  L. ACIDOPHILUS/STREPT/LA P-AZAEL (MICHAEL-Q PO) Take 1 Cap by mouth nightly. No facility-administered medications prior to visit. Imaging:  MRI Results (most recent):    Results from Hospital Encounter encounter on 06/09/17   MRI [facility] SPINE WO CONT   Narrative EXAM:  MRI [facility] SPINE WO CONT    INDICATION:  T/L-spine trauma, significant injury suspected, +/- localizing  signs. Low back pain    COMPARISON: 11/15/2016    TECHNIQUE: MR imaging of the thoracic spine was performed using the following  sequences: sagittal T1, T2, stir; axial T1, T2.     CONTRAST: None. FINDINGS:    Mild mid thoracic kyphosis is noted. There is no significant subluxation. . There  is a severe, chronic compression fracture at T7. There is an acute, severe  inferior and superior endplate compression fracture at T12. There is only  minimal bulging of the posterior inferior margin of the fracture. No evidence of  extension into the pedicles or posterior elements is seen. . Mild osteophytic  bony endplate changes are noted at multiple levels. . No concerning signal  changes are seen in the visualized paraspinal soft tissues. The course, caliber, and signal intensity of the spinal cord are normal.     A large hiatal hernia is redemonstrated.      Multiple minimal disc bulges and/or protrusions are noted. The largest of these  are a left paracentral disc protrusion at T5-T6 and a right paracentral disc  protrusion at T4-T5. Both of these partially efface the lateral recesses. Minimal bulging of the posterior inferior margin of the T12 fracture causes no  significant narrowing of the canal. There is no significant spinal canal  stenosis throughout the thoracic spine. Impression IMPRESSION:    1. Acute, severe T12 compression fracture. Minimal bulging of the posterior  inferior margin of the fracture, however this does not cause significant spinal  canal stenosis. No evidence of extension of the fracture into the pedicles or  posterior elements. 2. Chronic T7 compression fracture. 3. Mild to mild/moderate thoracic spondylosis. 23X  [phone]           CT Results (most recent):    Results from Hospital Encounter encounter on 11/15/16   CT HEAD WO CONT   Narrative EXAM:  CT HEAD WITHOUT CONTRAST    INDICATION: Found on the floor after fall. COMPARISON: 3/31/2016. CONTRAST: None. TECHNIQUE: Unenhanced CT of the head was performed using 5 mm images. Brain and  bone windows were generated. Sagittal and coronal reformations were generated. CT dose reduction was achieved through use of a standardized protocol tailored  for this examination and automatic exposure control for dose modulation. CT dose  reduction was achieved through use of a standardized protocol tailored for this  examination and automatic exposure control for dose modulation. Adaptive  statistical iterative reconstruction (ASIR) was utilized for this examination. FINDINGS:  The ventricles and sulci are normal in size, shape and configuration and  midline. There is atherosclerotic calcification of the vertebral arteries. There is patchy decreased attenuation in the periventricular white matter which  is nonspecific but consistent with small vessel disease. There is no  intracranial hemorrhage.   There is no extra-axial collection, mass, mass effect  or midline shift. The basilar cisterns are open. No acute infarct is  identified. The bone windows demonstrate no abnormalities. The visualized  portions of the paranasal sinuses and mastoid air cells are clear. Impression IMPRESSION: No hemorrhage or acute intracranial abnormality. Microvascular  disease unchanged.              [facility] and media tab today    Lab Review   Results for orders placed or performed in visit on 05/08/17   CULTURE, URINE   Result Value Ref Range    Urine Culture, Routine (A)      Escherichia coli  Greater than 100,000 colony forming units per mL      Urine Culture, Routine (A)      Enterococcus species  25,000-50,000 colony forming units per mL         Susceptibility    Enterococcus  species -  (no method available)*     Ciprofloxacin ($) S Susceptible      Levofloxacin ($) S Susceptible      Nitrofurantoin S Susceptible      Penicillin S Susceptible      Tetracycline R Resistant      Vancomycin ($) S Susceptible      * Performed at:  [address] [facility] [address], [place]  [id number] Director: Argentina Parra MD, Phone:  [phone]    Escherichia coli -  (no method available)*     Amoxicillin/Clavulanic A S Susceptible      Ampicillin ($) S Susceptible      Cefepime ($$) S Susceptible      Ceftriaxone ($) S Susceptible      Cefuroxime ($) S Susceptible      Cephalothin S Susceptible      Ciprofloxacin ($) S Susceptible      Ertapenem ($$$$) S Susceptible      Gentamicin ($) S Susceptible      Imipenem S Susceptible      Levofloxacin ($) S Susceptible      Nitrofurantoin S Susceptible      Piperacillin S Susceptible      Tetracycline S Susceptible      Tobramycin ($) S Susceptible      Trimeth-Sulfamethoxa S Susceptible      * Performed at:  [address] [facility] [address], [place]  [id number] Director: Argentina Parra MD, Phone:  [phone]   AMB POC URINALYSIS DIP STICK MANUAL W/O MICRO   Result Value Ref Range    Color (UA POC) Dark Yellow     Clarity (UA POC) Turbid     Glucose (UA POC) Negative Negative    Bilirubin (UA POC) Negative Negative    Ketones (UA POC) Negative Negative    Specific gravity (UA POC) 1.025 1.001 - 1.035    Blood (UA POC) 3+ Negative    pH (UA POC) 7.0 4.6 - 8.0    Protein (UA POC) Negative Negative mg/dL    Urobilinogen (UA POC) 0.2 mg/dL 0.2 - 1    Nitrites (UA POC) Negative Negative    Leukocyte esterase (UA POC) 1+ Negative        Exam:  Visit Vitals    /66    Pulse (!) 53    Resp 18    Ht 4' 9\" (1.448 m)    Wt 59.9 kg (132 lb)    SpO2 97%    BMI 28.56 kg/m2     General:  Alert, cooperative, no distress. Head:  Normocephalic, without obvious abnormality, atraumatic. Respiratory:  Heart:   Non labored breathing  Regular rate and rhythm, no murmurs       Extremities: Warm, no cyanosis or edema. Pulses: 2+ radial pulses. Neurologic:  MS: Alert, speech intact. Language -see MMSE Attention and fund of knowledge appropriate. Recent and remote memory intact.   Cranial Nerves:  II: visual fields    II: pupils    II: optic disc    III,VII: ptosis none   III,IV,VI: extraocular muscles  EOMI, no nystagmus or diplopia   V: facial light touch sensation     VII: facial muscle function   symmetric   VIII: hearing intact   IX: soft palate elevation     XI: trapezius strength     XI: sternocleidomastoid strength    XII: tongue       Motor: normal bulk and tone, no tremor              Strength: 5/5 throughout, no PD  Coordination: FTN and HTS intact, SUNITHA intact  Gait: normal gait  Reflexes: 2+ symmetric    Mini Mental State Exam 7/5/2017 1/5/2017   What is the Year 1 1   What is the Season 1 1   What is the Date 1 1   What is the Day 1 1   What is the Month 1 1   Where are we State 1 1   Where are we Country 1 1   Where are we [address] [place] [address] or [facility] 1 1   Whree are we Floor 1 1   Name three objects, then ask the patient to say them 3 3   Serial sevens Subtract 7 from 100 in increments 5 5   Ask for the three objects repeated above 3 3   Name a pencil 1 1   Name a watch 1 1   Have the patient repeat this phrase \"No ifs, ands, or buts\" 1 1   Three stage command: Take the paper in your right hand 1 1   Fold the paper in half 1 1   Put the paper on the floor 1 1   Read and obey the following: CLOSE YOUR EYES 1 1   Have the patient write a sentence 1 1   Have the patient copy a figure 1 1   Mini Mental Score 30 30          Assessment/Plan   Pt is an 80 y.o. RH female hospitalized at [facility] 11/15-19/16 for episode of LOC at home resulting in a compression fracture at T7 and tongue trauma, with 2 prior episodes of unexplained LOC, the last in April, 2016 and associated with tongue trauma at that time as well. Additionally, MRI of the brain 11/15/16 with tiny acute infarct in the right deep white matter. EEG 11/15/16 was normal. Started on Keppra 250mg bid empirically for seizures without episode of LOC since. Exam with MMSE score of 30/30, and is non-focal.  Discussed her morning of feeling confused, difficult to know exactly what occurred. Encouraged pt to call her family or 911 if this occurs again. Instructed to call the clinic if she has another spell. I have no concern regarding her memory at this point, her DIL is in agreement. Continue Keppra 250mg bid. F/u in clinic in 6 months, instructed to call in the interim if needed. ICD-10-CM ICD-9-CM    1. Seizures ([facility] R56.9 780.39        Signed:   Shawn Berkowitz MD  7/5/2017

## 2017-07-05 NOTE — PROGRESS NOTES
Patient here for follow up on seizures. No seizures since last office visit. No falls since last office visit.

## 2017-07-06 LAB
ALBUMIN SERPL-MCNC: 4.5 G/DL (ref 3.5–4.7)
ALBUMIN/GLOB SERPL: 1.7 {RATIO} (ref 1.2–2.2)
ALP SERPL-CCNC: 86 IU/L (ref 39–117)
ALT SERPL-CCNC: 13 IU/L (ref 0–32)
AST SERPL-CCNC: 18 IU/L (ref 0–40)
BASOPHILS # BLD AUTO: 0.1 X10E3/UL (ref 0–0.2)
BASOPHILS NFR BLD AUTO: 1 %
BILIRUB SERPL-MCNC: 0.4 MG/DL (ref 0–1.2)
BUN SERPL-MCNC: 12 MG/DL (ref 8–27)
BUN/CREAT SERPL: 11 (ref 12–28)
CALCIUM SERPL-MCNC: 9.6 MG/DL (ref 8.7–10.3)
CHLORIDE SERPL-SCNC: 96 MMOL/L (ref 96–106)
CHOLEST SERPL-MCNC: 172 MG/DL (ref 100–199)
CO2 SERPL-SCNC: 21 MMOL/L (ref 18–29)
CREAT SERPL-MCNC: 1.11 MG/DL (ref 0.57–1)
EOSINOPHIL # BLD AUTO: 0.1 X10E3/UL (ref 0–0.4)
EOSINOPHIL NFR BLD AUTO: 2 %
ERYTHROCYTE [DISTWIDTH] IN BLOOD BY AUTOMATED COUNT: 15.8 % (ref 12.3–15.4)
FERRITIN SERPL-MCNC: 120 NG/ML (ref 15–150)
GLOBULIN SER CALC-MCNC: 2.6 G/DL (ref 1.5–4.5)
GLUCOSE SERPL-MCNC: 124 MG/DL (ref 65–99)
HCT VFR BLD AUTO: 42.8 % (ref 34–46.6)
HDLC SERPL-MCNC: 66 MG/DL
HGB BLD-MCNC: 14.3 G/DL (ref 11.1–15.9)
IMM GRANULOCYTES # BLD: 0 X10E3/UL (ref 0–0.1)
IMM GRANULOCYTES NFR BLD: 0 %
INTERPRETATION, 910389: NORMAL
INTERPRETATION: NORMAL
IRON SATN MFR SERPL: 27 % (ref 15–55)
IRON SERPL-MCNC: 96 UG/DL (ref 27–139)
LDLC SERPL CALC-MCNC: 71 MG/DL (ref 0–99)
LYMPHOCYTES # BLD AUTO: 1.8 X10E3/UL (ref 0.7–3.1)
LYMPHOCYTES NFR BLD AUTO: 23 %
MCH RBC QN AUTO: 31 PG (ref 26.6–33)
MCHC RBC AUTO-ENTMCNC: 33.4 G/DL (ref 31.5–35.7)
MCV RBC AUTO: 93 FL (ref 79–97)
MONOCYTES # BLD AUTO: 0.6 X10E3/UL (ref 0.1–0.9)
MONOCYTES NFR BLD AUTO: 8 %
NEUTROPHILS # BLD AUTO: 5.1 X10E3/UL (ref 1.4–7)
NEUTROPHILS NFR BLD AUTO: 66 %
PDF IMAGE, 910387: NORMAL
PLATELET # BLD AUTO: 343 X10E3/UL (ref 150–379)
POTASSIUM SERPL-SCNC: 4.7 MMOL/L (ref 3.5–5.2)
PROT SERPL-MCNC: 7.1 G/DL (ref 6–8.5)
RBC # BLD AUTO: 4.61 X10E6/UL (ref 3.77–5.28)
SODIUM SERPL-SCNC: 135 MMOL/L (ref 134–144)
TIBC SERPL-MCNC: 356 UG/DL (ref 250–450)
TRIGL SERPL-MCNC: 176 MG/DL (ref 0–149)
UIBC SERPL-MCNC: 260 UG/DL (ref 118–369)
VLDLC SERPL CALC-MCNC: 35 MG/DL (ref 5–40)
WBC # BLD AUTO: 7.7 X10E3/UL (ref 3.4–10.8)

## 2017-07-07 NOTE — PROGRESS NOTES
Letter sent to the address on file to notify the patient per Dr Chrystal Ashley that the labs are excellent. Asked for a return call to the office with any additional questions.

## 2017-07-12 ENCOUNTER — OFFICE VISIT (OUTPATIENT)
Dept: INTERNAL MEDICINE CLINIC | Age: 82
End: 2017-07-12

## 2017-07-12 VITALS
HEART RATE: 61 BPM | DIASTOLIC BLOOD PRESSURE: 56 MMHG | HEIGHT: 57 IN | SYSTOLIC BLOOD PRESSURE: 124 MMHG | OXYGEN SATURATION: 98 % | RESPIRATION RATE: 16 BRPM | BODY MASS INDEX: 29.12 KG/M2 | WEIGHT: 135 LBS

## 2017-07-12 DIAGNOSIS — I63.9 CEREBROVASCULAR ACCIDENT (CVA), UNSPECIFIED MECHANISM (HCC): ICD-10-CM

## 2017-07-12 DIAGNOSIS — R56.9 SEIZURE (HCC): ICD-10-CM

## 2017-07-12 DIAGNOSIS — E78.00 HYPERCHOLESTEREMIA: ICD-10-CM

## 2017-07-12 DIAGNOSIS — I10 HTN (HYPERTENSION), BENIGN: Primary | ICD-10-CM

## 2017-07-12 DIAGNOSIS — D50.0 IRON DEFICIENCY ANEMIA DUE TO CHRONIC BLOOD LOSS: ICD-10-CM

## 2017-07-12 DIAGNOSIS — M80.00XD OSTEOPOROSIS WITH CURRENT PATHOLOGICAL FRACTURE WITH ROUTINE HEALING, UNSPECIFIED OSTEOPOROSIS TYPE, SUBSEQUENT ENCOUNTER: ICD-10-CM

## 2017-07-12 NOTE — PROGRESS NOTES
Coordination of Care Questions    1. Have you been to the ER, urgent care clinic since your last visit? No       Hospitalized since your last visit? No    2. Have you seen or consulted any other health care providers outside of the [address] [place] [address] since your last visit? Include any pap smears or colon screening.  No

## 2017-07-12 NOTE — PROGRESS NOTES
Chief Complaint   Patient presents with    Follow-up     Subjective: Woo Klein is a 80 y.o. RH femal        Patient Active Problem List    Diagnosis    St[facility] ([facility]    HTN (hypertension), benign    Diastolic dysfunction    HEART (dyspnea on exertion)    COPD (chronic obstructive pulmonary disease) ([facility]    Khadar lesion, chronic     Seen on EGD 5/7/13      Iron deficiency anemia    Osteoporosis     Fosamax  2005  No fractures  In all these years      Hypercholesteremia       Denies gerd    Vitals:    07/12/17 1526   BP: 124/56   Pulse: 61   Resp: 16   SpO2: 98%   Weight: 135 lb (61.2 kg)   Height: 4' 9\" (1.448 m)     S1 and S2 normal, no murmurs, clicks, gallops or rubs. Regular rate and rhythm. Chest is clear; no wheezes or rales. No edema or JVD. Neuro intact  Uses a cane for ambulation support and balance        There are no diagnoses linked to this encounter. This is a Subsequent Medicare Annual Wellness Visit providing Personalized Prevention Plan Services (PPPS) (Performed 12 months after initial AWV and PPPS )    I have reviewed the patient's medical history in detail and updated the computerized patient record. History     Past Medical History:   Diagnosis Date    Arthritis    Nayeli Jessica lesion, chronic 5/8/2013    Seen on EGD 5/7/13    CVA (cerebral vascular accident) ([facility]     Tiny punctate infarct right frontal lobe, 10/2016.  HEART (dyspnea on exertion) 6/12/2015    GERD (gastroesophageal reflux disease)     Hypercholesterolemia     Hypertension 9/22/2010    Ill-defined condition     Osteoporosis 9/22/2010      Subjective: Woo Klein is a 80 y.o. RH female initially and last seen on 11/15/16 while hospitalized at [facility]. [facility] 11/15-19/16 for episode of LOC at home resulting in a compression fracture at T7 and tongue trauma, with 2 prior episodes of unexplained LOC, the last in April and associated with tongue trauma at that time as well.  Additionally, MRI of the brain with tiny acute infarct in the right deep white matter. Exam revealed upgoing toe on the left, otherwise unremarkable. EEG was normal. Episodes of LOC with tongue trauma were concerning for seizure. No known seizure risk factors other than age. Family denied concerns about dementia. Recommended starting Keppra 250 mg po bid given high risk for injury with subsequent seizures. The stroke seen on MRI would not have been responsible for LOC or seizure. It is hard to know if the two are related. She was started on aspirin 81 mg a day and recommended continued good control of her stroke risk factors. She was also seen by Cardiology and discharged with a loop recorder. (negative)  was in rehab at [address] [place] [address] until 12/1/16. Still going to therapy as an outpt. She has not had any subsequent episodes of LOC or unusual spells. She does not remember anything from her hospital stay and is repeating herself more. She is able to take her meds correctly everyday and is remembering her family and friends appropriately. [place] independently in a [place] community. Cooks for herself  Follow up doing well now wioth no pain she is tolerating the fosamax    Loose bowels ?  From meds or lactose  Past Surgical History:   Procedure Laterality Date    CARDIAC SURG PROCEDURE UNLIST  2008    nuclear stress test normal     ENDOSCOPY, COLON, DIAGNOSTIC  2008, 2013    HX CATARACT REMOVAL      HX GI  2013    egd   kia ulcer    HX ORTHOPAEDIC  2010    knee replacement    IMPLANT  LOOP RECORDER  11/17/2016          Current Outpatient Prescriptions   Medication Sig Dispense Refill    metoprolol tartrate (LOPRESSOR) 50 mg tablet TAKE ONE TABLET BY MOUTH 2 TIMES A  Tab 22    atorvastatin (LIPITOR) 20 mg tablet TAKE ONE TABLET BY MOUTH EVERY DAY 90 Tab 1    omeprazole (PRILOSEC) 20 mg capsule TAKE ONE CAPSULE BY MOUTH ONCE DAILY ONE-HALF HOUR BEFORE A MEAL FOR S TOMACH ACID 90 Cap 1    lisinopril (PRINIVIL, ZESTRIL) 5 mg tablet Take 1 Tab by mouth daily. 90 Tab 1    amLODIPine (NORVASC) 5 mg tablet Take 1 Tab by mouth daily. 90 Tab 1    cholecalciferol (VITAMIN D3) 1,000 unit cap Take 4 Caps by mouth daily. 100 Cap 4    alendronate (FOSAMAX) 70 mg tablet Take 1 Tab by mouth every seven (7) days. 12 Tab 3    acetaminophen (TYLENOL) 500 mg tablet Take 2 Tabs by mouth every six (6) hours. 100 Tab 0    aspirin 81 mg chewable tablet Take 1 Tab by mouth daily. 30 Tab 0    albuterol (PROAIR HFA) 90 mcg/actuation inhaler Take 2 Puffs by inhalation every four (4) hours as needed for Wheezing or Shortness of Breath. 1 Inhaler 4    levETIRAcetam (KEPPRA) 250 mg tablet Take 250 mg by mouth two (2) times a day.  oxyCODONE IR (ROXICODONE) 5 mg immediate release tablet Take 5 mg by mouth every eight (8) hours as needed for Pain.  SENNA-DOCUSATE SODIUM PO Take  by mouth.  wheat dextrin (BENEFIBER SUGAR FREE, DEXTRIN,) 3 gram/3.8 gram powd Take  by mouth daily.  L. ACIDOPHILUS/STREPT/LA P-AZAEL (MICHAEL-Q PO) Take 1 Cap by mouth nightly.  CALCIUM CARBONATE (OYSTER SHELL CALCIUM 500 PO) Take 1 Tab by mouth daily.  ferrous sulfate 325 mg (65 mg iron) tablet Take 1 Tab by mouth two (2) times a day. Indications: IRON DEFICIENCY ANEMIA      latanoprost (XALATAN) 0.005 % ophthalmic solution Administer 1 Drop to both eyes nightly.  timolol (TIMOPTIC) 0.5 % ophthalmic solution Administer 1 Drop to right eye two (2) times a day.        No Known Allergies  Family History   Problem Relation Age of Onset    Heart Disease Mother     Heart Disease Father     Heart Disease Sister     Alzheimer Sister     Alcohol abuse Brother      Social History   Substance Use Topics    Smoking status: Former Smoker     Years: 30.00     Types: Cigarettes     Quit date: 9/21/2000    Smokeless tobacco: Never Used    Alcohol use No     Patient Active Problem List   Diagnosis Code    Osteoporosis M81.0    Hypercholesteremia E78.00    Iron deficiency anemia D50.9    Khadar lesion, chronic K25.7    HEART (dyspnea on exertion) R06.09    COPD (chronic obstructive pulmonary disease) (HCC) U17.3    Diastolic dysfunction W92.4    HTN (hypertension), benign I10    Stroke ([facility] I63.9       Depression Risk Factor Screening:     PHQ over the last two weeks 7/12/2017   Little interest or pleasure in doing things Not at all   Feeling down, depressed or hopeless Not at all   Total Score PHQ 2 0     Alcohol Risk Factor Screening:         Functional Ability and Level of Safety:     Hearing Loss   mild    Activities of Daily Living   Self-care. Requires assistance with: no ADLs    Fall Risk     Fall Risk Assessment, last 12 mths 7/12/2017   Able to walk? Yes   Fall in past 12 months? No     Abuse Screen   Patient is not abused    Review of Systems   Pertinent items are noted in HPI.     Physical Examination     Evaluation of Cognitive Function:  Mood/affect:  happy  Appearance: age appropriate  Family member/caregiver input: family helps a lot  No longer driving    Visit Vitals    /56    Pulse 61    Resp 16    Ht 4' 9\" (1.448 m)    Wt 135 lb (61.2 kg)    SpO2 98%    BMI 29.21 kg/m2     General appearance: alert, cooperative, no distress, appears stated age  Lungs: clear to auscultation bilaterally  Heart: regular rate and rhythm, S1, S2 normal, no murmur, click, rub or gallop  Neurologic: Grossly normal  Saw neuro and note reviewed    Lab Results  Component Value Date/Time   WBC 7.7 07/05/2017 01:39 PM   HGB 14.3 07/05/2017 01:39 PM   Hemoglobin (POC) 11.6 07/10/2013 09:09 PM   HCT 42.8 07/05/2017 01:39 PM   Hematocrit (POC) 34 07/10/2013 09:09 PM   PLATELET 306 04/33/9625 01:39 PM   MCV 93 07/05/2017 01:39 PM     Lab Results  Component Value Date/Time   Cholesterol, total 172 07/05/2017 01:39 PM   HDL Cholesterol 66 07/05/2017 01:39 PM   LDL, calculated 71 07/05/2017 01:39 PM   Triglyceride 176 07/05/2017 01:39 PM   CHOL/HDL Ratio 1.9 11/16/2016 03:53 AM     Lab Results  Component Value Date/Time   ALT (SGPT) 13 07/05/2017 01:39 PM   AST (SGOT) 18 07/05/2017 01:39 PM   Alk. phosphatase 86 07/05/2017 01:39 PM   Bilirubin, direct 0.1 01/06/2010 09:58 AM   Bilirubin, total 0.4 07/05/2017 01:39 PM   Albumin 4.5 07/05/2017 01:39 PM   Protein, total 7.1 07/05/2017 01:39 PM   INR 1.0 11/15/2016 10:10 AM   Prothrombin time 10.3 11/15/2016 10:10 AM   PLATELET 490 95/29/5408 01:39 PM       Lab Results  Component Value Date/Time   GFR est non-AA 46 07/05/2017 01:39 PM   GFRNA, POC 39 07/10/2013 09:09 PM   GFR est AA 53 07/05/2017 01:39 PM   GFRAA, POC 48 07/10/2013 09:09 PM   Creatinine 1.11 07/05/2017 01:39 PM   Creatinine (POC) 1.3 07/10/2013 09:09 PM   BUN 12 07/05/2017 01:39 PM   BUN (POC) 23 07/10/2013 09:09 PM   Sodium 135 07/05/2017 01:39 PM   Sodium (POC) 137 07/10/2013 09:09 PM   Potassium 4.7 07/05/2017 01:39 PM   Potassium (POC) 3.8 07/10/2013 09:09 PM   Chloride 96 07/05/2017 01:39 PM   Chloride (POC) 104 07/10/2013 09:09 PM   CO2 21 07/05/2017 01:39 PM   Magnesium 1.8 11/27/2016 04:20 AM   Phosphorus 4.2 11/27/2016 04:20 AM     Lab Results   Component Value Date/Time    Iron 96 07/05/2017 01:39 PM    TIBC 356 07/05/2017 01:39 PM    Iron % saturation 27 07/05/2017 01:39 PM    Ferritin 120 07/05/2017 01:39 PM           Patient Care Team:  Cuong Oliver MD as PCP - Brian Hobbs MD (Cardiology)  Crystal Sprague MD as Physician (Neurology)    Advice/Referrals/Counseling   Education and counseling provided:  Are appropriate based on today's review and evaluation  End-of-Life planning (with patient's consent)  Pneumococcal Vaccine  Influenza Vaccine      Assessment/Plan   There are no diagnoses linked to this encounter. .      1. HTN (hypertension), benign  controlled    2. Iron deficiency anemia due to chronic blood loss  Better can reduce to one daily iron    3. Hypercholesteremia  controlled    4.  Osteoporosis with current pathological fracture with routine healing, unspecified osteoporosis type, subsequent encounter  Doing ok on meds    5. Seizure ([address]  stable    6.  Cerebrovascular accident (CVA), unspecified mechanism ([address]  Has recovered

## 2017-07-12 NOTE — MR AVS SNAPSHOT
Visit Information Date & Time Provider Department Dept. Phone Encounter #  
 7/12/2017  3:15 PM Vicky Aranda, [facility] [phone] [id number] Your Appointments 1/5/2018  1:40 PM  
Follow Up with Claudene Khat, MD [facility] [place] [id number] [facility]) Appt Note: 6 month f/u NN 7/5/17 [address] [place] [address]  
[phone]  
  
   
 [address] [place] [id number] Upcoming Health Maintenance Date Due ZOSTER VACCINE AGE 60> 5/23/1993 GLAUCOMA SCREENING Q2Y 6/9/2017 INFLUENZA AGE 9 TO ADULT 8/1/2017 MEDICARE YEARLY EXAM 4/29/2018 DTaP/Tdap/Td series (2 - Td) 3/31/2026 Allergies as of 7/12/2017  Review Complete On: 7/12/2017 By: Mark Ceballos LPN No Known Allergies Current Immunizations  Reviewed on 11/15/2016 Name Date Influenza High Dose Vaccine PF 10/13/2015 Influenza Vaccine 10/2/2013 Influenza Vaccine (Quad) PF 9/15/2014 Pneumococcal Conjugate (PCV-13) 11/2/2015 Pneumococcal Polysaccharide (PPSV-23) 9/23/2013 Tdap 3/31/2016  3:27 AM  
  
 Not reviewed this visit Vitals BP Pulse Resp Height(growth percentile) Weight(growth percentile) SpO2  
 124/56 61 16 4' 9\" (1.448 m) 135 lb (61.2 kg) 98% BMI OB Status Smoking Status 29.21 kg/m2 Postmenopausal Former Smoker Vitals History BMI and BSA Data Body Mass Index Body Surface Area  
 29.21 kg/m 2 1.57 m 2 Preferred Pharmacy Pharmacy Name Phone [phone] [place] [address] [phone] Your Updated Medication List  
  
   
This list is accurate as of: 7/12/17  4:01 PM.  Always use your most recent med list.  
  
  
  
  
 acetaminophen 500 mg tablet Commonly known as:  TYLENOL Take 2 Tabs by mouth every six (6) hours. albuterol 90 mcg/actuation inhaler Commonly known as:  PROAIR HFA Take 2 Puffs by inhalation every four (4) hours as needed for Wheezing or Shortness of Breath. alendronate 70 mg tablet Commonly known as:  FOSAMAX Take 1 Tab by mouth every seven (7) days. amLODIPine 5 mg tablet Commonly known as:  Job Dub Take 1 Tab by mouth daily. aspirin 81 mg chewable tablet Take 1 Tab by mouth daily. atorvastatin 20 mg tablet Commonly known as:  LIPITOR  
TAKE ONE TABLET BY MOUTH EVERY DAY  
  
 BENEFIBER SUGAR FREE (DEXTRIN) 3 gram/3.8 gram Powd Generic drug:  wheat dextrin Take  by mouth daily. cholecalciferol 1,000 unit Cap Commonly known as:  VITAMIN D3 Take 4 Caps by mouth daily. ferrous sulfate 325 mg (65 mg iron) tablet Take 1 Tab by mouth two (2) times a day. Indications: IRON DEFICIENCY ANEMIA MICHAEL-Q PO Take 1 Cap by mouth nightly. latanoprost 0.005 % ophthalmic solution Commonly known as:  Sandhya Caras Administer 1 Drop to both eyes nightly. levETIRAcetam 250 mg tablet Commonly known as:  KEPPRA Take 250 mg by mouth two (2) times a day. lisinopril 5 mg tablet Commonly known as:  Thomas Marilu Take 1 Tab by mouth daily. metoprolol tartrate 50 mg tablet Commonly known as:  LOPRESSOR  
TAKE ONE TABLET BY MOUTH 2 TIMES A DAY  
  
 omeprazole 20 mg capsule Commonly known as:  PRILOSEC  
TAKE ONE CAPSULE BY MOUTH ONCE DAILY ONE-HALF HOUR BEFORE A MEAL FOR S TOMACH ACID  
  
 oxyCODONE IR 5 mg immediate release tablet Commonly known as:  Tiffani Mendoza Take 5 mg by mouth every eight (8) hours as needed for Pain. OYSTER SHELL CALCIUM 500 PO Take 1 Tab by mouth daily. SENNA-DOCUSATE SODIUM PO Take  by mouth. timolol 0.5 % ophthalmic solution Commonly known as:  TIMOPTIC Administer 1 Drop to right eye two (2) times a day. Introducing [facility]!    
 [employer] introduces [employer] patient portal. Now you can access parts of your medical record, email your doctor's office, and request medication refills online. 1. In your internet browser, go to [email or web address] 2. Click on the First Time User? Click Here link in the Sign In box. You will see the New Member Sign Up page. 3. Enter your Creditable Access Code exactly as it appears below. You will not need to use this code after youve completed the sign-up process. If you do not sign up before the expiration date, you must request a new code. · Creditable Access Code: [email or web address] Expires: 8/6/2017  3:41 PM 
 
4. Enter the last four digits of your Social Security Number (xxxx) and Date of Birth (mm/dd/yyyy) as indicated and click Submit. You will be taken to the next sign-up page. 5. Create a Creditable ID. This will be your Creditable login ID and cannot be changed, so think of one that is secure and easy to remember. 6. Create a Creditable password. You can change your password at any time. 7. Enter your Password Reset Question and Answer. This can be used at a later time if you forget your password. 8. Enter your e-mail address. You will receive e-mail notification when new information is available in [address] 9. Click Sign Up. You can now view and download portions of your medical record. 10. Click the Download Summary menu link to download a portable copy of your medical information. If you have questions, please visit the Frequently Asked Questions section of the Creditable website. Remember, Creditable is NOT to be used for urgent needs. For medical emergencies, dial 911. Now available from your iPhone and Android! Please provide this summary of care documentation to your next provider. Your primary care clinician is listed as Lachelle Moe. If you have any questions after today's visit, please call [phone].

## 2017-07-25 ENCOUNTER — OFFICE VISIT (OUTPATIENT)
Dept: CARDIOLOGY CLINIC | Age: 82
End: 2017-07-25

## 2017-07-25 DIAGNOSIS — Z95.818 STATUS POST PLACEMENT OF IMPLANTABLE LOOP RECORDER: Primary | ICD-10-CM

## 2017-08-14 ENCOUNTER — OFFICE VISIT (OUTPATIENT)
Dept: CARDIOLOGY CLINIC | Age: 82
End: 2017-08-14

## 2017-08-14 DIAGNOSIS — R55 SYNCOPE, UNSPECIFIED SYNCOPE TYPE: Primary | ICD-10-CM

## 2017-08-14 DIAGNOSIS — Z95.818 STATUS POST PLACEMENT OF IMPLANTABLE LOOP RECORDER: ICD-10-CM

## 2017-08-14 PROBLEM — I63.9 CEREBROVASCULAR ACCIDENT (CVA) (HCC): Status: ACTIVE | Noted: 2017-08-14

## 2017-08-31 ENCOUNTER — OFFICE VISIT (OUTPATIENT)
Dept: CARDIOLOGY CLINIC | Age: 82
End: 2017-08-31

## 2017-08-31 DIAGNOSIS — I63.9 CEREBROVASCULAR ACCIDENT (CVA), UNSPECIFIED MECHANISM (HCC): Primary | ICD-10-CM

## 2017-09-04 RX ORDER — LISINOPRIL 5 MG/1
TABLET ORAL
Qty: 90 TAB | Refills: 0 | Status: SHIPPED | OUTPATIENT
Start: 2017-09-04 | End: 2017-12-04 | Stop reason: SDUPTHER

## 2017-09-04 RX ORDER — AMLODIPINE BESYLATE 5 MG/1
TABLET ORAL
Qty: 90 TAB | Refills: 0 | Status: SHIPPED | OUTPATIENT
Start: 2017-09-04 | End: 2017-12-04 | Stop reason: SDUPTHER

## 2017-09-14 ENCOUNTER — OFFICE VISIT (OUTPATIENT)
Dept: INTERNAL MEDICINE CLINIC | Age: 82
End: 2017-09-14

## 2017-09-14 VITALS
WEIGHT: 134 LBS | SYSTOLIC BLOOD PRESSURE: 171 MMHG | HEIGHT: 57 IN | HEART RATE: 72 BPM | BODY MASS INDEX: 28.91 KG/M2 | RESPIRATION RATE: 20 BRPM | DIASTOLIC BLOOD PRESSURE: 89 MMHG | OXYGEN SATURATION: 91 % | TEMPERATURE: 97.5 F

## 2017-09-14 DIAGNOSIS — R29.898 LEFT HAND WEAKNESS: Primary | ICD-10-CM

## 2017-09-14 NOTE — PROGRESS NOTES
Reviewed record in preparation for visit and have obtained necessary documentation. Identified pt with two pt identifiers(name and ). Health Maintenance Due   Topic    ZOSTER VACCINE AGE 60>     GLAUCOMA SCREENING Q2Y     INFLUENZA AGE 9 TO ADULT          No chief complaint on file. Wt Readings from Last 3 Encounters:   17 134 lb (60.8 kg)   17 135 lb (61.2 kg)   17 132 lb (59.9 kg)     Temp Readings from Last 3 Encounters:   17 97.5 °F (36.4 °C) (Oral)   17 97.7 °F (36.5 °C) (Oral)   17 96.1 °F (35.6 °C) (Oral)     BP Readings from Last 3 Encounters:   17 124/56   17 110/66   17 124/50     Pulse Readings from Last 3 Encounters:   17 61   17 (!) 53   17 60           Learning Assessment:  :     Learning Assessment 2017   PRIMARY LEARNER Patient Patient Patient   PRIMARY LANGUAGE ENGLISH ENGLISH ENGLISH   LEARNER PREFERENCE PRIMARY LISTENING LISTENING DEMONSTRATION     - - LISTENING   ANSWERED BY patient self self   RELATIONSHIP SELF SELF SELF       Depression Screening:  :     PHQ over the last two weeks 2017   Little interest or pleasure in doing things Not at all   Feeling down, depressed or hopeless Not at all   Total Score PHQ 2 0       Fall Risk Assessment:  :     Fall Risk Assessment, last 12 mths 2017   Able to walk? Yes   Fall in past 12 months? No       Abuse Screening:  :     Abuse Screening Questionnaire 2017   Do you ever feel afraid of your partner? N   Are you in a relationship with someone who physically or mentally threatens you? N   Is it safe for you to go home?  Y       Coordination of Care Questionnaire:  :     1) Have you been to an emergency room, urgent care clinic since your last visit? no   Hospitalized since your last visit? no             2) Have you seen or consulted any other health care providers outside of [address] [place] [address] since your last visit? no (Include any pap smears or colon screenings in this section.)    3) Do you have an Advance Directive on file? yes    4) Are you interested in receiving information on Advance Directives? NO      Patient is accompanied by self I have received verbal consent from Tawanda Serve to discuss any/all medical information while they are present in the room.

## 2017-09-14 NOTE — PROGRESS NOTES
HISTORY OF PRESENT ILLNESS  Dianelys Tapia is a 80 y.o. female. HPI  Patient presents to the office for evaluation of ear morning finger weakness. (3 rd, 4th, and 5 th) She reports last night she fell a sleep reading. This am she got up and went to the bathroom. It was at that time she noticed that three fingers on her right hand was not working correctly. She states she reached out to use the railing next to the toilet and although she could grab it, the sensation felt different and she was shaking. She then called her son and he came over and went through the stroke protocol with her. She did not seem to meet the criteria, so they made an appointment to be seen here. He feels as though his mother is back to baseline. She denies speech changes, visual changes, facial droop. She reports feeling a little dizzy when she was shaky. Review of Systems   Neurological: Positive for dizziness and tremors. Negative for sensory change, speech change, focal weakness and seizures. Symptoms now resolved. Blood pressure 171/89, pulse 72, temperature 97.5 °F (36.4 °C), temperature source Oral, resp. rate 20, height 4' 9\" (1.448 m), weight 134 lb (60.8 kg), SpO2 91 %. Physical Exam   Constitutional: She appears well-developed and well-nourished. No distress. Cardiovascular: Normal rate and regular rhythm. Pulmonary/Chest: Effort normal and breath sounds normal.   Neurological: She has normal reflexes. No cranial nerve deficit. Coordination normal.   Strength is equal when compared of upper and lower extremity.  strength is good  No facial abnormality  Speech is normal.       ASSESSMENT and PLAN  Diagnoses and all orders for this visit:    1. Left hand weakness    I spoke to Dr. David Patterson about this patient. I explained the patient appears to be at baseline.  Perhaps the patient slept in a position that caused impingement or pressure on the ulna nerve. (abnormality was the 3rd, 4 th and 5 th digits) I explained this to both the patient and her son. We talked about signs and symptoms of stroke. They both understand she should seek medical attention if this is suspected. The patient and the son both agree with this plan.

## 2017-11-06 ENCOUNTER — OFFICE VISIT (OUTPATIENT)
Dept: CARDIOLOGY CLINIC | Age: 82
End: 2017-11-06

## 2017-11-06 ENCOUNTER — TELEPHONE (OUTPATIENT)
Dept: INTERNAL MEDICINE CLINIC | Age: 82
End: 2017-11-06

## 2017-11-06 DIAGNOSIS — I63.9 CEREBROVASCULAR ACCIDENT (CVA), UNSPECIFIED MECHANISM (HCC): Primary | ICD-10-CM

## 2017-11-06 DIAGNOSIS — D50.0 ANEMIA DUE TO GI BLOOD LOSS: Primary | ICD-10-CM

## 2017-11-11 LAB
ALBUMIN SERPL-MCNC: 4.8 G/DL (ref 3.5–4.7)
ALBUMIN/GLOB SERPL: 1.7 {RATIO} (ref 1.2–2.2)
ALP SERPL-CCNC: 77 IU/L (ref 39–117)
ALT SERPL-CCNC: 16 IU/L (ref 0–32)
AST SERPL-CCNC: 23 IU/L (ref 0–40)
BASOPHILS # BLD AUTO: 0.1 X10E3/UL (ref 0–0.2)
BASOPHILS NFR BLD AUTO: 1 %
BILIRUB SERPL-MCNC: 0.7 MG/DL (ref 0–1.2)
BUN SERPL-MCNC: 15 MG/DL (ref 8–27)
BUN/CREAT SERPL: 16 (ref 12–28)
CALCIUM SERPL-MCNC: 10 MG/DL (ref 8.7–10.3)
CHLORIDE SERPL-SCNC: 98 MMOL/L (ref 96–106)
CO2 SERPL-SCNC: 23 MMOL/L (ref 18–29)
CREAT SERPL-MCNC: 0.95 MG/DL (ref 0.57–1)
EOSINOPHIL # BLD AUTO: 0.1 X10E3/UL (ref 0–0.4)
EOSINOPHIL NFR BLD AUTO: 1 %
ERYTHROCYTE [DISTWIDTH] IN BLOOD BY AUTOMATED COUNT: 13.9 % (ref 12.3–15.4)
GFR SERPLBLD CREATININE-BSD FMLA CKD-EPI: 55 ML/MIN/1.73
GFR SERPLBLD CREATININE-BSD FMLA CKD-EPI: 64 ML/MIN/1.73
GLOBULIN SER CALC-MCNC: 2.8 G/DL (ref 1.5–4.5)
GLUCOSE SERPL-MCNC: 84 MG/DL (ref 65–99)
HCT VFR BLD AUTO: 43.8 % (ref 34–46.6)
HGB BLD-MCNC: 14.6 G/DL (ref 11.1–15.9)
IMM GRANULOCYTES # BLD: 0 X10E3/UL (ref 0–0.1)
IMM GRANULOCYTES NFR BLD: 0 %
INTERPRETATION: NORMAL
IRON SATN MFR SERPL: 28 % (ref 15–55)
IRON SERPL-MCNC: 113 UG/DL (ref 27–139)
LYMPHOCYTES # BLD AUTO: 1.3 X10E3/UL (ref 0.7–3.1)
LYMPHOCYTES NFR BLD AUTO: 16 %
MCH RBC QN AUTO: 31.7 PG (ref 26.6–33)
MCHC RBC AUTO-ENTMCNC: 33.3 G/DL (ref 31.5–35.7)
MCV RBC AUTO: 95 FL (ref 79–97)
MONOCYTES # BLD AUTO: 0.8 X10E3/UL (ref 0.1–0.9)
MONOCYTES NFR BLD AUTO: 9 %
NEUTROPHILS # BLD AUTO: 5.9 X10E3/UL (ref 1.4–7)
NEUTROPHILS NFR BLD AUTO: 73 %
PLATELET # BLD AUTO: 326 X10E3/UL (ref 150–379)
POTASSIUM SERPL-SCNC: 5.3 MMOL/L (ref 3.5–5.2)
PROT SERPL-MCNC: 7.6 G/DL (ref 6–8.5)
RBC # BLD AUTO: 4.61 X10E6/UL (ref 3.77–5.28)
SODIUM SERPL-SCNC: 138 MMOL/L (ref 134–144)
TIBC SERPL-MCNC: 403 UG/DL (ref 250–450)
UIBC SERPL-MCNC: 290 UG/DL (ref 118–369)
WBC # BLD AUTO: 8.1 X10E3/UL (ref 3.4–10.8)

## 2017-11-15 ENCOUNTER — OFFICE VISIT (OUTPATIENT)
Dept: INTERNAL MEDICINE CLINIC | Age: 82
End: 2017-11-15

## 2017-11-15 VITALS
SYSTOLIC BLOOD PRESSURE: 124 MMHG | WEIGHT: 136 LBS | HEIGHT: 55 IN | OXYGEN SATURATION: 98 % | HEART RATE: 66 BPM | RESPIRATION RATE: 16 BRPM | DIASTOLIC BLOOD PRESSURE: 68 MMHG | BODY MASS INDEX: 31.47 KG/M2

## 2017-11-15 DIAGNOSIS — I51.89 DIASTOLIC DYSFUNCTION: ICD-10-CM

## 2017-11-15 DIAGNOSIS — D50.8 IRON DEFICIENCY ANEMIA SECONDARY TO INADEQUATE DIETARY IRON INTAKE: ICD-10-CM

## 2017-11-15 DIAGNOSIS — I10 HTN (HYPERTENSION), BENIGN: Primary | ICD-10-CM

## 2017-11-15 RX ORDER — ALBUTEROL SULFATE 90 UG/1
2 AEROSOL, METERED RESPIRATORY (INHALATION)
Qty: 1 INHALER | Refills: 6 | Status: SHIPPED | OUTPATIENT
Start: 2017-11-15

## 2017-11-15 RX ORDER — LANOLIN ALCOHOL/MO/W.PET/CERES
325 CREAM (GRAM) TOPICAL
Qty: 100 TAB | Refills: 6
Start: 2017-11-15

## 2017-11-15 NOTE — LETTER
4/3/2018 5:50 PM 
 
Ms. Sourav Pagan [address] [place] [id number] Dear Sourav Pagan: 
 
Please find your most recent results below. Resulted Orders CBC WITH AUTOMATED DIFF Result Value Ref Range WBC 7.9 3.4 - 10.8 x10E3/uL  
 RBC 4.52 3.77 - 5.28 x10E6/uL HGB 14.3 11.1 - 15.9 g/dL HCT 41.5 34.0 - 46.6 % MCV 92 79 - 97 fL  
 MCH 31.6 26.6 - 33.0 pg  
 MCHC 34.5 31.5 - 35.7 g/dL  
 RDW 14.2 12.3 - 15.4 % PLATELET 395 078 - 187 x10E3/uL NEUTROPHILS 66 Not Estab. % Lymphocytes 19 Not Estab. % MONOCYTES 12 Not Estab. % EOSINOPHILS 2 Not Estab. % BASOPHILS 1 Not Estab. %  
 ABS. NEUTROPHILS 5.3 1.4 - 7.0 x10E3/uL Abs Lymphocytes 1.5 0.7 - 3.1 x10E3/uL  
 ABS. MONOCYTES 0.9 0.1 - 0.9 x10E3/uL  
 ABS. EOSINOPHILS 0.1 0.0 - 0.4 x10E3/uL  
 ABS. BASOPHILS 0.1 0.0 - 0.2 x10E3/uL IMMATURE GRANULOCYTES 0 Not Estab. %  
 ABS. IMM. GRANS. 0.0 0.0 - 0.1 x10E3/uL Narrative Performed at:  [address]  [id number] : Kenzie Jha MD, Phone:  [phone] LIPID PANEL Result Value Ref Range Cholesterol, total 182 100 - 199 mg/dL Triglyceride 114 0 - 149 mg/dL HDL Cholesterol 91 >39 mg/dL VLDL, calculated 23 5 - 40 mg/dL LDL, calculated 68 0 - 99 mg/dL Narrative Performed at:  [address]  [id number] : Kenzie Jha MD, Phone:  [phone] METABOLIC PANEL, COMPREHENSIVE Result Value Ref Range Glucose 76 65 - 99 mg/dL BUN 14 8 - 27 mg/dL Creatinine 0.83 0.57 - 1.00 mg/dL GFR est non-AA 65 >59 mL/min/1.73 GFR est AA 75 >59 mL/min/1.73  
 BUN/Creatinine ratio 17 12 - 28 Sodium 135 134 - 144 mmol/L Potassium 4.8 3.5 - 5.2 mmol/L Chloride 93 (L) 96 - 106 mmol/L  
 CO2 24 18 - 29 mmol/L Calcium 9.6 8.7 - 10.3 mg/dL Protein, total 6.9 6.0 - 8.5 g/dL Albumin 4.6 3.5 - 4.7 g/dL GLOBULIN, TOTAL 2.3 1.5 - 4.5 g/dL A-G Ratio 2.0 1.2 - 2.2 Bilirubin, total 0.6 0.0 - 1.2 mg/dL Alk. phosphatase 72 39 - 117 IU/L  
 AST (SGOT) 24 0 - 40 IU/L  
 ALT (SGPT) 17 0 - 32 IU/L Narrative Performed at:  [address]  [id number] : Isis Seymour MD, Phone:  [phone] CVD REPORT Result Value Ref Range INTERPRETATION Note Comment:  
   Supplemental report is available. Narrative Performed at:  [address] [place] [address] [address] [place] [address]  [id number] : Jennifer Saldivar PhD, Phone:  [phone] RECOMMENDATIONS: 
I have reviewed all lab results which are normal or stable Please call me if you have any questions: [phone] Sincerely, Manuel Chowdary MD

## 2017-11-15 NOTE — MR AVS SNAPSHOT
Visit Information Date & Time Provider Department Dept. Phone Encounter #  
 11/15/2017  1:45 PM Gigi Ruvalcaba MD [facility] [phone] [id number] Follow-up Instructions Return in about 4 months (around 3/15/2018). Your Appointments 1/5/2018  1:40 PM  
Follow Up with Hanane Giordano MD  
[facility] [address] [facility]) Appt Note: 6 month f/u NN 7/5/17 [address] [place] [id number]  
[phone]  
  
   
 [address] [address] Upcoming Health Maintenance Date Due ZOSTER VACCINE AGE 60> 3/23/1993 GLAUCOMA SCREENING Q2Y 6/9/2017 MEDICARE YEARLY EXAM 4/29/2018 DTaP/Tdap/Td series (2 - Td) 3/31/2026 Allergies as of 11/15/2017  Review Complete On: 11/15/2017 By: Renay Logan LPN No Known Allergies Current Immunizations  Reviewed on 11/15/2016 Name Date Influenza High Dose Vaccine PF 10/13/2015 Influenza Vaccine 10/2/2013 Influenza Vaccine (Quad) PF 9/15/2014 Pneumococcal Conjugate (PCV-13) 11/2/2015 Pneumococcal Polysaccharide (PPSV-23) 9/23/2013 Tdap 3/31/2016  3:27 AM  
  
 Not reviewed this visit You Were Diagnosed With   
  
 Codes Comments HTN (hypertension), benign    -  Primary ICD-10-CM: I10 
ICD-9-CM: 401.1 Iron deficiency anemia secondary to inadequate dietary iron intake     ICD-10-CM: D50.8 ICD-9-CM: 280.1 Diastolic dysfunction     VAN-81-GW: I51.9 ICD-9-CM: 429.9 Vitals BP Pulse Resp Height(growth percentile) Weight(growth percentile) SpO2  
 124/68 66 16 4' 6\" (1.372 m) 136 lb (61.7 kg) 98% BMI OB Status Smoking Status 32.79 kg/m2 Postmenopausal Former Smoker Vitals History BMI and BSA Data Body Mass Index Body Surface Area 32.79 kg/m 2 1.53 m 2 Preferred Pharmacy Pharmacy Name Phone [address] [address] [phone] Your Updated Medication List  
  
   
This list is accurate as of: 11/15/17  2:34 PM.  Always use your most recent med list.  
  
  
  
  
 acetaminophen 500 mg tablet Commonly known as:  TYLENOL Take 2 Tabs by mouth every six (6) hours. albuterol 90 mcg/actuation inhaler Commonly known as:  PROAIR HFA Take 2 Puffs by inhalation every four (4) hours as needed for Wheezing or Shortness of Breath. Ventolin brand requested  
  
 alendronate 70 mg tablet Commonly known as:  FOSAMAX Take 1 Tab by mouth every seven (7) days. amLODIPine 5 mg tablet Commonly known as:  Doretha Croon TAKE ONE TABLET BY MOUTH EVERY DAY  
  
 aspirin 81 mg chewable tablet Take 1 Tab by mouth daily. atorvastatin 20 mg tablet Commonly known as:  LIPITOR  
TAKE ONE TABLET BY MOUTH EVERY DAY  
  
 BENEFIBER SUGAR FREE (DEXTRIN) 3 gram/3.8 gram Powd Generic drug:  wheat dextrin Take  by mouth daily. cholecalciferol 1,000 unit Cap Commonly known as:  VITAMIN D3 Take 4 Caps by mouth daily. ferrous sulfate 325 mg (65 mg iron) tablet Take 1 Tab by mouth every fourty-eight (48) hours. Indications: Iron Deficiency Anemia MICHAEL-Q PO Take 1 Cap by mouth nightly. latanoprost 0.005 % ophthalmic solution Commonly known as:  Joseph Coronado Administer 1 Drop to both eyes nightly. levETIRAcetam 250 mg tablet Commonly known as:  KEPPRA Take 250 mg by mouth two (2) times a day. lisinopril 5 mg tablet Commonly known as:  PRINIVIL, ZESTRIL  
TAKE ONE TABLET BY MOUTH EVERY DAY  
  
 metoprolol tartrate 50 mg tablet Commonly known as:  LOPRESSOR  
TAKE ONE TABLET BY MOUTH 2 TIMES A DAY  
  
 omeprazole 20 mg capsule Commonly known as:  PRILOSEC  
TAKE ONE CAPSULE BY MOUTH ONCE DAILY ONE-HALF HOUR BEFORE A MEAL FOR S TOMACH ACID OYSTER SHELL CALCIUM 500 PO Take 1 Tab by mouth daily. SENNA-DOCUSATE SODIUM PO Take  by mouth. timolol 0.5 % ophthalmic solution Commonly known as:  TIMOPTIC Administer 1 Drop to right eye two (2) times a day. Prescriptions Sent to Pharmacy Refills  
 albuterol (PROAIR HFA) 90 mcg/actuation inhaler 6 Sig: Take 2 Puffs by inhalation every four (4) hours as needed for Wheezing or Shortness of Breath. Ventolin brand requested Class: Normal  
 Pharmacy: [address] [place], [place] [address]y 18, [address] #: [phone] Route: Inhalation We Performed the Following CBC WITH AUTOMATED DIFF [54700 CPT(R)] LIPID PANEL [19148 CPT(R)] METABOLIC PANEL, COMPREHENSIVE [10064 CPT(R)] Follow-up Instructions Return in about 4 months (around 3/15/2018). Introducing [facility]! [employer] [facility] introduces [employer] patient portal. Now you can access parts of your medical record, email your doctor's office, and request medication refills online. 1. In your internet browser, go to [email or web address] 2. Click on the First Time User? Click Here link in the Sign In box. You will see the New Member Sign Up page. 3. Enter your [employer] Access Code exactly as it appears below. You will not need to use this code after youve completed the sign-up process. If you do not sign up before the expiration date, you must request a new code. · [employer] Access Code: [email or web address] Expires: 2/13/2018  2:32 PM 
 
4. Enter the last four digits of your Social Security Number (xxxx) and Date of Birth (mm/dd/yyyy) as indicated and click Submit. You will be taken to the next sign-up page. 5. Create a [employer] ID. This will be your [employer] login ID and cannot be changed, so think of one that is secure and easy to remember. 6. Create a [employer] password. You can change your password at any time. 7. Enter your Password Reset Question and Answer.  This can be used at a later time if you forget your password. 8. Enter your e-mail address. You will receive e-mail notification when new information is available in [address] 9. Click Sign Up. You can now view and download portions of your medical record. 10. Click the Download Summary menu link to download a portable copy of your medical information. If you have questions, please visit the Frequently Asked Questions section of the [employer] website. Remember, [employer] is NOT to be used for urgent needs. For medical emergencies, dial 911. Now available from your iPhone and Android! Please provide this summary of care documentation to your next provider. Your primary care clinician is listed as Raysa Xie. If you have any questions after today's visit, please call [phone].

## 2017-11-15 NOTE — PROGRESS NOTES
Coordination of Care Questions    1. Have you been to the ER, urgent care clinic since your last visit? No       Hospitalized since your last visit? No    2. Have you seen or consulted any other health care providers outside of the [address] [place] [address] since your last visit? Include any pap smears or colon screening.  No

## 2017-11-15 NOTE — PROGRESS NOTES
Chief Complaint   Patient presents with    Follow-up     4 mon, pt feels jittery and anxious      Chief Complaint   Patient presents with    Follow-up     4 mon, pt feels jittery and anxious      Subjective: Abraham Gomez is a 80 y.o. RH femal        Patient Active Problem List    Diagnosis    [facility]    Stroke ([facility]    HTN (hypertension), benign    Diastolic dysfunction    HEART (dyspnea on exertion)    COPD (chronic obstructive pulmonary disease) ([facility]    Khadra lesion, chronic     Seen on EGD 5/7/13      Iron deficiency anemia    Osteoporosis     Fosamax  2005  No fractures  In all these years      Hypercholesteremia       Denies gerd    Vitals:    11/15/17 1357   BP: 124/68   Pulse: 66   Resp: 16   SpO2: 98%   Weight: 136 lb (61.7 kg)   Height: 4' 6\" (1.372 m)     S1 and S2 normal, no murmurs, clicks, gallops or rubs. Regular rate and rhythm. Chest is clear; no wheezes or rales. No edema or JVD. Neuro intact  Uses a cane for ambulation support and balance         hospitalized at [facility] 11/15-19/16 for episode of LOC at home resulting in a compression fracture at T7 and tongue trauma, with 2 prior episodes of unexplained LOC, the last in April and associated with tongue trauma at that time as well. Additionally, MRI of the brain with tiny acute infarct in the right deep white matter. Exam revealed upgoing toe on the left, otherwise unremarkable. EEG was normal. Episodes of LOC with tongue trauma were concerning for seizure. No known seizure risk factors other than age. Family denied concerns about dementia. Recommended starting Keppra 250 mg po bid given high risk for injury with subsequent seizures. The stroke seen on MRI would not have been responsible for LOC or seizure. It is hard to know if the two are related. She was started on aspirin 81 mg a day and recommended continued good control of her stroke risk factors.  She was also seen by Cardiology and discharged with a loop recorder. (negative)  was in rehab at [address] [place] [address] until 12/1/16. Still going to therapy as an outpt. She has not had any subsequent episodes of LOC or unusual spells. She does not remember anything from her hospital stay and is repeating herself more. She is able to take her meds correctly everyday and is remembering her family and friends appropriately. [place] independently [place]. Cooks for herself  Follow up doing well now wioth no pain she is tolerating the fosamax    Loose bowels better with benefiber  Past Surgical History:   Procedure Laterality Date    CARDIAC SURG PROCEDURE UNLIST  2008    nuclear stress test normal     ENDOSCOPY, COLON, DIAGNOSTIC  2008, 2013    HX CATARACT REMOVAL      HX GI  2013    egd   kia ulcer    HX ORTHOPAEDIC  2010    knee replacement    IMPLANT  LOOP RECORDER  11/17/2016          Current Outpatient Prescriptions   Medication Sig Dispense Refill    lisinopril (PRINIVIL, ZESTRIL) 5 mg tablet TAKE ONE TABLET BY MOUTH EVERY DAY 90 Tab 0    amLODIPine (NORVASC) 5 mg tablet TAKE ONE TABLET BY MOUTH EVERY DAY 90 Tab 0    metoprolol tartrate (LOPRESSOR) 50 mg tablet TAKE ONE TABLET BY MOUTH 2 TIMES A  Tab 22    atorvastatin (LIPITOR) 20 mg tablet TAKE ONE TABLET BY MOUTH EVERY DAY 90 Tab 1    omeprazole (PRILOSEC) 20 mg capsule TAKE ONE CAPSULE BY MOUTH ONCE DAILY ONE-HALF HOUR BEFORE A MEAL FOR S TOMACH ACID 90 Cap 1    cholecalciferol (VITAMIN D3) 1,000 unit cap Take 4 Caps by mouth daily. 100 Cap 4    alendronate (FOSAMAX) 70 mg tablet Take 1 Tab by mouth every seven (7) days. 12 Tab 3    acetaminophen (TYLENOL) 500 mg tablet Take 2 Tabs by mouth every six (6) hours. 100 Tab 0    aspirin 81 mg chewable tablet Take 1 Tab by mouth daily. 30 Tab 0    albuterol (PROAIR HFA) 90 mcg/actuation inhaler Take 2 Puffs by inhalation every four (4) hours as needed for Wheezing or Shortness of Breath.  1 Inhaler 4    levETIRAcetam (KEPPRA) 250 mg tablet Take 250 mg by mouth two (2) times a day.  SENNA-DOCUSATE SODIUM PO Take  by mouth.  wheat dextrin (BENEFIBER SUGAR FREE, DEXTRIN,) 3 gram/3.8 gram powd Take  by mouth daily.  L. ACIDOPHILUS/STREPT/LA P-AZAEL (MICHAEL-Q PO) Take 1 Cap by mouth nightly.  CALCIUM CARBONATE (OYSTER SHELL CALCIUM 500 PO) Take 1 Tab by mouth daily.  ferrous sulfate 325 mg (65 mg iron) tablet Take 1 Tab by mouth two (2) times a day. Indications: IRON DEFICIENCY ANEMIA      latanoprost (XALATAN) 0.005 % ophthalmic solution Administer 1 Drop to both eyes nightly.  timolol (TIMOPTIC) 0.5 % ophthalmic solution Administer 1 Drop to right eye two (2) times a day. No Known Allergies  Family History   Problem Relation Age of Onset    Heart Disease Mother     Heart Disease Father     Heart Disease Sister     Alzheimer Sister     Alcohol abuse Brother      Social History   Substance Use Topics    Smoking status: Former Smoker     Years: 30.00     Types: Cigarettes     Quit date: 9/21/2000    Smokeless tobacco: Never Used    Alcohol use No     Patient Active Problem List   Diagnosis Code    Osteoporosis M81.0    Hypercholesteremia E78.00    Iron deficiency anemia D50.9    Khadar lesion, chronic K25.7    HEART (dyspnea on exertion) R06.09    COPD (chronic obstructive pulmonary disease) (HCC) J27.4    Diastolic dysfunction E84.9    HTN (hypertension), benign I10    Stroke ([facility] I63.9    Cerebrovascular accident (CVA) ([facility] I63.9       Depression Risk Factor Screening:     PHQ over the last two weeks 9/14/2017   Little interest or pleasure in doing things Not at all   Feeling down, depressed or hopeless Not at all   Total Score PHQ 2 0     Alcohol Risk Factor Screening:         Functional Ability and Level of Safety:     Hearing Loss   mild    Activities of Daily Living   Self-care.    Requires assistance with: no ADLs    Fall Risk     Fall Risk Assessment, last 12 mths 11/15/2017 Able to walk? Yes   Fall in past 12 months? No     Abuse Screen   Patient is not abused    Review of Systems   Pertinent items are noted in HPI. Physical Examination     Evaluation of Cognitive Function:  Mood/affect:  happy  Appearance: age appropriate  Family member/caregiver input: family helps a lot  No longer driving    Visit Vitals    /68    Pulse 66    Resp 16    Ht 4' 6\" (1.372 m)    Wt 136 lb (61.7 kg)    SpO2 98%    BMI 32.79 kg/m2     General appearance: alert, cooperative, no distress, appears stated age  Lungs: clear to auscultation bilaterally  Heart: regular rate and rhythm, S1, S2 normal, no murmur, click, rub or gallop  Neurologic: Grossly normal  Saw neuro and note reviewed    Lab Results   Component Value Date/Time    WBC 8.1 11/10/2017 11:12 AM    HGB 14.6 11/10/2017 11:12 AM    Hemoglobin (POC) 11.6 07/10/2013 09:09 PM    HCT 43.8 11/10/2017 11:12 AM    Hematocrit (POC) 34 07/10/2013 09:09 PM    PLATELET 729 90/70/4165 11:12 AM    MCV 95 11/10/2017 11:12 AM     Lab Results   Component Value Date/Time    Cholesterol, total 172 07/05/2017 01:39 PM    HDL Cholesterol 66 07/05/2017 01:39 PM    LDL, calculated 71 07/05/2017 01:39 PM    Triglyceride 176 07/05/2017 01:39 PM    CHOL/HDL Ratio 1.9 11/16/2016 03:53 AM     Lab Results   Component Value Date/Time    ALT (SGPT) 16 11/10/2017 11:12 AM    AST (SGOT) 23 11/10/2017 11:12 AM    Alk.  phosphatase 77 11/10/2017 11:12 AM    Bilirubin, direct 0.1 01/06/2010 09:58 AM    Bilirubin, total 0.7 11/10/2017 11:12 AM    Albumin 4.8 11/10/2017 11:12 AM    Protein, total 7.6 11/10/2017 11:12 AM    INR 1.0 11/15/2016 10:10 AM    Prothrombin time 10.3 11/15/2016 10:10 AM    PLATELET 372 25/22/3859 11:12 AM       Lab Results   Component Value Date/Time    GFR est non-AA 55 11/10/2017 11:12 AM    GFRNA, POC 39 07/10/2013 09:09 PM    GFR est AA 64 11/10/2017 11:12 AM    GFRAA, POC 48 07/10/2013 09:09 PM    Creatinine 0.95 11/10/2017 11:12 AM Creatinine (POC) 1.3 07/10/2013 09:09 PM    BUN 15 11/10/2017 11:12 AM    BUN (POC) 23 07/10/2013 09:09 PM    Sodium 138 11/10/2017 11:12 AM    Sodium (POC) 137 07/10/2013 09:09 PM    Potassium 5.3 11/10/2017 11:12 AM    Potassium (POC) 3.8 07/10/2013 09:09 PM    Chloride 98 11/10/2017 11:12 AM    Chloride (POC) 104 07/10/2013 09:09 PM    CO2 23 11/10/2017 11:12 AM    Magnesium 1.8 11/27/2016 04:20 AM    Phosphorus 4.2 11/27/2016 04:20 AM     Lab Results   Component Value Date/Time    Iron 113 11/10/2017 11:12 AM    TIBC 403 11/10/2017 11:12 AM    Iron % saturation 28 11/10/2017 11:12 AM    Ferritin 120 07/05/2017 01:39 PM           Patient Care Team:  Helio Pichardo MD as PCP - Stacy Ham MD (Cardiology)  Davy Kaiser MD as Physician (Neurology)    Advice/Referrals/Counseling   Education and counseling provided:  Are appropriate based on today's review and evaluation  End-of-Life planning (with patient's consent)  Pneumococcal Vaccine  Influenza Vaccine      Assessment/Plan       1. HTN (hypertension), benign  controlled    2. Iron deficiency anemia due to chronic blood loss  Better can reduce to one   Every other daily iron    3. Hypercholesteremia  controlled    4. Osteoporosis with current pathological fracture with routine healing, unspecified osteoporosis type, subsequent encounter  Doing ok on meds    5. Seizure ([address]  stable    6.  Cerebrovascular accident (CVA), unspecified mechanism ([address]  Has recovered  7 asthma stable uses prn albuterol

## 2017-12-04 DIAGNOSIS — E78.00 HYPERCHOLESTEREMIA: ICD-10-CM

## 2017-12-04 DIAGNOSIS — I10 HTN (HYPERTENSION), BENIGN: ICD-10-CM

## 2017-12-04 DIAGNOSIS — D50.0 IRON DEFICIENCY ANEMIA DUE TO CHRONIC BLOOD LOSS: ICD-10-CM

## 2017-12-04 DIAGNOSIS — I63.9 CEREBROVASCULAR ACCIDENT (CVA), UNSPECIFIED MECHANISM (HCC): ICD-10-CM

## 2017-12-04 RX ORDER — AMLODIPINE BESYLATE 5 MG/1
TABLET ORAL
Qty: 90 TAB | Refills: 0 | Status: SHIPPED | OUTPATIENT
Start: 2017-12-04 | End: 2018-03-09 | Stop reason: SDUPTHER

## 2017-12-04 RX ORDER — CHOLECALCIFEROL (VITAMIN D3) 25 MCG
TABLET ORAL
Qty: 100 TAB | Refills: 0 | Status: SHIPPED | OUTPATIENT
Start: 2017-12-04 | End: 2018-01-01 | Stop reason: SDUPTHER

## 2017-12-04 RX ORDER — LISINOPRIL 5 MG/1
TABLET ORAL
Qty: 90 TAB | Refills: 0 | Status: SHIPPED | OUTPATIENT
Start: 2017-12-04 | End: 2018-04-04 | Stop reason: SDUPTHER

## 2017-12-04 RX ORDER — ATORVASTATIN CALCIUM 20 MG/1
TABLET, FILM COATED ORAL
Qty: 90 TAB | Refills: 0 | Status: SHIPPED | OUTPATIENT
Start: 2017-12-04 | End: 2018-03-05 | Stop reason: SDUPTHER

## 2017-12-06 RX ORDER — ALBUTEROL SULFATE 90 UG/1
AEROSOL, METERED RESPIRATORY (INHALATION)
Qty: 8.5 INHALER | Refills: 0 | Status: SHIPPED | OUTPATIENT
Start: 2017-12-06 | End: 2018-03-20 | Stop reason: SDUPTHER

## 2017-12-10 DIAGNOSIS — E78.00 HYPERCHOLESTEREMIA: ICD-10-CM

## 2017-12-10 DIAGNOSIS — D50.0 IRON DEFICIENCY ANEMIA DUE TO CHRONIC BLOOD LOSS: ICD-10-CM

## 2017-12-10 DIAGNOSIS — I10 HTN (HYPERTENSION), BENIGN: ICD-10-CM

## 2017-12-10 DIAGNOSIS — I63.9 CEREBROVASCULAR ACCIDENT (CVA), UNSPECIFIED MECHANISM (HCC): ICD-10-CM

## 2017-12-10 RX ORDER — OMEPRAZOLE 20 MG/1
CAPSULE, DELAYED RELEASE ORAL
Qty: 90 CAP | Refills: 0 | Status: SHIPPED | OUTPATIENT
Start: 2017-12-10 | End: 2018-03-09 | Stop reason: SDUPTHER

## 2017-12-10 RX ORDER — LEVETIRACETAM 250 MG/1
TABLET ORAL
Qty: 180 TAB | Refills: 0 | Status: SHIPPED | OUTPATIENT
Start: 2017-12-10 | End: 2018-03-09 | Stop reason: SDUPTHER

## 2017-12-12 ENCOUNTER — OFFICE VISIT (OUTPATIENT)
Dept: CARDIOLOGY CLINIC | Age: 82
End: 2017-12-12

## 2017-12-12 DIAGNOSIS — I63.9 CEREBROVASCULAR ACCIDENT (CVA), UNSPECIFIED MECHANISM (HCC): Primary | ICD-10-CM

## 2018-01-05 ENCOUNTER — OFFICE VISIT (OUTPATIENT)
Dept: NEUROLOGY | Age: 83
End: 2018-01-05

## 2018-01-05 VITALS
OXYGEN SATURATION: 98 % | RESPIRATION RATE: 18 BRPM | HEART RATE: 63 BPM | SYSTOLIC BLOOD PRESSURE: 112 MMHG | DIASTOLIC BLOOD PRESSURE: 72 MMHG | BODY MASS INDEX: 33.03 KG/M2 | WEIGHT: 137 LBS

## 2018-01-05 DIAGNOSIS — R56.9 SEIZURES (HCC): Primary | ICD-10-CM

## 2018-01-05 RX ORDER — ALENDRONATE SODIUM 70 MG/1
70 TABLET ORAL
Qty: 12 TAB | Refills: 1 | Status: SHIPPED | OUTPATIENT
Start: 2018-01-05 | End: 2018-04-04 | Stop reason: SDUPTHER

## 2018-01-05 NOTE — MR AVS SNAPSHOT
Visit Information Date & Time Provider Department Dept. Phone Encounter #  
 1/5/2018  1:40 PM Ty Michel MD [facility] at [address] [id number] Follow-up Instructions Return in about 6 months (around 7/5/2018). Your Appointments 3/14/2018  2:00 PM  
ROUTINE CARE with Mima Claude, MD  
[facility]) Appt Note: 4 MONTH F/U  
 [place] [address] [facility] [address]  
[facility] [address] [facility] [phone] Upcoming Health Maintenance Date Due ZOSTER VACCINE AGE 60> 3/23/1993 GLAUCOMA SCREENING Q2Y 6/9/2017 MEDICARE YEARLY EXAM 4/29/2018 DTaP/Tdap/Td series (2 - Td) 3/31/2026 Allergies as of 1/5/2018  Review Complete On: 1/5/2018 By: Janene Ledesma LPN No Known Allergies Current Immunizations  Reviewed on 11/15/2016 Name Date Influenza High Dose Vaccine PF 10/13/2015 Influenza Vaccine 10/2/2013 Influenza Vaccine (Quad) PF 9/15/2014 Pneumococcal Conjugate (PCV-13) 11/2/2015 Pneumococcal Polysaccharide (PPSV-23) 9/23/2013 Tdap 3/31/2016  3:27 AM  
  
 Not reviewed this visit Vitals BP Pulse Resp Weight(growth percentile) SpO2 BMI  
 112/72 63 18 137 lb (62.1 kg) 98% 33.03 kg/m2 OB Status Smoking Status Postmenopausal Former Smoker Vitals History BMI and BSA Data Body Mass Index Body Surface Area 33.03 kg/m 2 1.54 m 2 Preferred Pharmacy Pharmacy Name Phone [address] [place] [address] [phone] Your Updated Medication List  
  
   
This list is accurate as of: 1/5/18  2:03 PM.  Always use your most recent med list.  
  
  
  
  
 acetaminophen 500 mg tablet Commonly known as:  TYLENOL Take 2 Tabs by mouth every six (6) hours. * albuterol 90 mcg/actuation inhaler Commonly known as:  PROAIR HFA Take 2 Puffs by inhalation every four (4) hours as needed for Wheezing or Shortness of Breath. Ventolin brand requested * PROAIR HFA 90 mcg/actuation inhaler Generic drug:  albuterol INHALE 2 PUFFS BY MOUTH EVERY 4 HOURS AS NEEDED FOR WHEEZING OR SHORTNESS OF BREATH  
  
 alendronate 70 mg tablet Commonly known as:  FOSAMAX Take 1 Tab by mouth every seven (7) days. amLODIPine 5 mg tablet Commonly known as:  Quirino Free TAKE ONE TABLET BY MOUTH EVERY DAY  
  
 aspirin 81 mg chewable tablet Take 1 Tab by mouth daily. atorvastatin 20 mg tablet Commonly known as:  LIPITOR  
TAKE ONE TABLET BY MOUTH EVERY DAY  
  
 BENEFIBER SUGAR FREE (DEXTRIN) 3 gram/3.8 gram Powd Generic drug:  wheat dextrin Take  by mouth daily. ferrous sulfate 325 mg (65 mg iron) tablet Take 1 Tab by mouth every fourty-eight (48) hours. Indications: Iron Deficiency Anemia MICHAEL-Q PO Take 1 Cap by mouth nightly. latanoprost 0.005 % ophthalmic solution Commonly known as:  Josh Sane Administer 1 Drop to both eyes nightly. levETIRAcetam 250 mg tablet Commonly known as:  KEPPRA TAKE ONE TABLET BY MOUTH 2 TIMES A DAY  
  
 * lisinopril 5 mg tablet Commonly known as:  PRINIVIL, ZESTRIL  
TAKE ONE TABLET BY MOUTH EVERY DAY  
  
 * lisinopril 5 mg tablet Commonly known as:  PRINIVIL, ZESTRIL  
TAKE ONE TABLET BY MOUTH EVERY DAY  
  
 metoprolol tartrate 50 mg tablet Commonly known as:  LOPRESSOR  
TAKE ONE TABLET BY MOUTH 2 TIMES A DAY  
  
 omeprazole 20 mg capsule Commonly known as:  PRILOSEC  
TAKE ONE CAPSULE BY MOUTH ONCE DAILY ONE-HALF HOUR BEFORE A MEAL FOR STOMACH ACID OYSTER SHELL CALCIUM 500 PO Take 1 Tab by mouth daily. timolol 0.5 % ophthalmic solution Commonly known as:  TIMOPTIC Administer 1 Drop to right eye two (2) times a day. VITAMIN D3 1,000 unit tablet Generic drug:  cholecalciferol TAKE 4 TABLETS BY MOUTH EVERY DAY  
  
 * Notice: This list has 4 medication(s) that are the same as other medications prescribed for you. Read the directions carefully, and ask your doctor or other care provider to review them with you. Follow-up Instructions Return in about 6 months (around 7/5/2018). Patient Instructions A Healthy Lifestyle: Care Instructions Your Care Instructions A healthy lifestyle can help you feel good, stay at a healthy weight, and have plenty of energy for both work and play. A healthy lifestyle is something you can share with your whole family. A healthy lifestyle also can lower your risk for serious health problems, such as high blood pressure, heart disease, and diabetes. You can follow a few steps listed below to improve your health and the health of your family. Follow-up care is a key part of your treatment and safety. Be sure to make and go to all appointments, and call your doctor if you are having problems. It's also a good idea to know your test results and keep a list of the medicines you take. How can you care for yourself at home? · Do not eat too much sugar, fat, or fast foods. You can still have dessert and treats now and then. The goal is moderation. · Start small to improve your eating habits. Pay attention to portion sizes, drink less juice and soda pop, and eat more fruits and vegetables. ¨ Eat a healthy amount of food. A 3-ounce serving of meat, for example, is about the size of a deck of cards. Fill the rest of your plate with vegetables and whole grains. ¨ Limit the amount of soda and sports drinks you have every day. Drink more water when you are thirsty. ¨ Eat at least 5 servings of fruits and vegetables every day. It may seem like a lot, but it is not hard to reach this goal. A serving or helping is 1 piece of fruit, 1 cup of vegetables, or 2 cups of leafy, raw vegetables. Have an apple or some carrot sticks as an afternoon snack instead of a candy bar. Try to have fruits and/or vegetables at every meal. 
· Make exercise part of your daily routine. You may want to start with simple activities, such as walking, bicycling, or slow swimming. Try to be active 30 to 60 minutes every day. You do not need to do all 30 to 60 minutes all at once. For example, you can exercise 3 times a day for 10 or 20 minutes. Moderate exercise is safe for most people, but it is always a good idea to talk to your doctor before starting an exercise program. 
· Keep moving. Chelsi Nam the lawn, work in the garden, or [facility]. Take the stairs instead of the elevator at work. · If you smoke, quit. People who smoke have an increased risk for heart attack, stroke, cancer, and other lung illnesses. Quitting is hard, but there are ways to boost your chance of quitting tobacco for good. ¨ Use nicotine gum, patches, or lozenges. ¨ Ask your doctor about stop-smoking programs and medicines. ¨ Keep trying. In addition to reducing your risk of diseases in the future, you will notice some benefits soon after you stop using tobacco. If you have shortness of breath or asthma symptoms, they will likely get better within a few weeks after you quit. · Limit how much alcohol you drink. Moderate amounts of alcohol (up to 2 drinks a day for men, 1 drink a day for women) are okay. But drinking too much can lead to liver problems, high blood pressure, and other health problems. Family health If you have a family, there are many things you can do together to improve your health. · Eat meals together as a family as often as possible. · Eat healthy foods. This includes fruits, vegetables, lean meats and dairy, and whole grains. · Include your family in your fitness plan.  Most people think of activities such as jogging or tennis as the way to fitness, but there are many ways you and your family can be more active. Anything that makes you breathe hard and gets your heart pumping is exercise. Here are some tips: 
¨ Walk to do errands or to take your child to school or the bus. ¨ Go for a family bike ride after dinner instead of watching TV. Where can you learn more? Go to http://adan-matthew.info/. Enter H704 in the search box to learn more about \"A Healthy Lifestyle: Care Instructions. \" Current as of: May 12, 2017 Content Version: 11.4 © 3697-1540 [employer]. Care instructions adapted under license by [employer] (which disclaims liability or warranty for this information). If you have questions about a medical condition or this instruction, always ask your healthcare professional. [address] any warranty or liability for your use of this information. Introducing [facility]! Oneal Crane introduces Speakap patient portal. Now you can access parts of your medical record, email your doctor's office, and request medication refills online. 1. In your internet browser, go to [email or web address] 2. Click on the First Time User? Click Here link in the Sign In box. You will see the New Member Sign Up page. 3. Enter your Speakap Access Code exactly as it appears below. You will not need to use this code after youve completed the sign-up process. If you do not sign up before the expiration date, you must request a new code. · Speakap Access Code: [email or web address] Expires: 2/13/2018  2:32 PM 
 
4. Enter the last four digits of your Social Security Number (xxxx) and Date of Birth (mm/dd/yyyy) as indicated and click Submit. You will be taken to the next sign-up page. 5. Create a Speakap ID. This will be your Speakap login ID and cannot be changed, so think of one that is secure and easy to remember. 6. Create a Eventfindat password. You can change your password at any time. 7. Enter your Password Reset Question and Answer. This can be used at a later time if you forget your password. 8. Enter your e-mail address. You will receive e-mail notification when new information is available in [address] 9. Click Sign Up. You can now view and download portions of your medical record. 10. Click the Download Summary menu link to download a portable copy of your medical information. If you have questions, please visit the Frequently Asked Questions section of the [employer] website. Remember, [employer] is NOT to be used for urgent needs. For medical emergencies, dial 911. Now available from your iPhone and Android! Please provide this summary of care documentation to your next provider. Your primary care clinician is listed as Herminio Miller. If you have any questions after today's visit, please call [phone].

## 2018-01-05 NOTE — PROGRESS NOTES
Neurology Progress Note    HISTORY PROVIDED BY: patient and DIL    Chief Complaint:   Chief Complaint   Patient presents with    Seizure      Subjective:   Pt is an 80 y.o. RH female last seen in clinic on 7/5/17 in f/u for probable epilepsy. Pt was hospitalized at [facility] 11/15-19/16 for episode of LOC at home resulting in a compression fracture at T7 and tongue trauma, with 2 prior episodes of unexplained LOC, the last in April, 2016 and associated with tongue trauma at that time as well. Additionally, MRI of the brain 11/15/16 with tiny acute infarct in the right deep white matter. EEG 11/15/16 was normal. Started on Keppra 250mg bid empirically for seizures without episode of LOC since. Exam with MMSE score of 30/30, and was non-focal.  Discussed her morning of feeling confused, difficult to know exactly what occurred. Encouraged pt to call her family or 911 if this occurs again. Instructed to call the clinic if she has another spell. I had no concern regarding her memory, her DIL was in agreement. Continued Keppra 250mg bid. She returns for planned f/u. No spells of confusion, no episodes of LOC. Doing well on Keppra 250mg bid. No new complaints or medical issues. Past Medical History:   Diagnosis Date    Arthritis    Amrlo Brightly lesion, chronic 5/8/2013    Seen on EGD 5/7/13    CVA (cerebral vascular accident) ([facility] [address]     Tiny punctate infarct right frontal lobe, 10/2016.     HEART (dyspnea on exertion) 6/12/2015    GERD (gastroesophageal reflux disease)     Hypercholesterolemia     Hypertension 9/22/2010    Ill-defined condition     Osteoporosis 9/22/2010      Past Surgical History:   Procedure Laterality Date    CARDIAC SURG PROCEDURE UNLIST  2008    nuclear stress test normal     ENDOSCOPY, COLON, DIAGNOSTIC  2008, 2013    HX CATARACT REMOVAL      HX GI  2013    egd   kia ulcer    HX ORTHOPAEDIC  2010    knee replacement    IMPLANT  LOOP RECORDER  11/17/2016           Social History Social History    Marital status: SINGLE     Spouse name: N/A    Number of children: N/A    Years of education: N/A     Occupational History    Not on file. Social History Main Topics    Smoking status: Former Smoker     Years: 30.00     Types: Cigarettes     Quit date: 9/21/2000    Smokeless tobacco: Never Used    Alcohol use No    Drug use: No    Sexual activity: Not Currently     Other Topics Concern    Not on file     Social History Narrative     Family History   Problem Relation Age of Onset    Heart Disease Mother     Heart Disease Father     Heart Disease Sister     Alzheimer Sister     Alcohol abuse Brother           Objective:   ROS:  Per HPI o/w neg    No Known Allergies    Meds:  Outpatient Medications Prior to Visit   Medication Sig Dispense Refill    levETIRAcetam (KEPPRA) 250 mg tablet TAKE ONE TABLET BY MOUTH 2 TIMES A  Tab 0    omeprazole (PRILOSEC) 20 mg capsule TAKE ONE CAPSULE BY MOUTH ONCE DAILY ONE-HALF HOUR BEFORE A MEAL FOR STOMACH ACID 90 Cap 0    PROAIR HFA 90 mcg/actuation inhaler INHALE 2 PUFFS BY MOUTH EVERY 4 HOURS AS NEEDED FOR WHEEZING OR SHORTNESS OF BREATH 8.5 Inhaler 0    atorvastatin (LIPITOR) 20 mg tablet TAKE ONE TABLET BY MOUTH EVERY DAY 90 Tab 0    lisinopril (PRINIVIL, ZESTRIL) 5 mg tablet TAKE ONE TABLET BY MOUTH EVERY DAY 90 Tab 0    amLODIPine (NORVASC) 5 mg tablet TAKE ONE TABLET BY MOUTH EVERY DAY 90 Tab 0    albuterol (PROAIR HFA) 90 mcg/actuation inhaler Take 2 Puffs by inhalation every four (4) hours as needed for Wheezing or Shortness of Breath. Ventolin brand requested 1 Inhaler 6    ferrous sulfate 325 mg (65 mg iron) tablet Take 1 Tab by mouth every fourty-eight (48) hours. Indications: Iron Deficiency Anemia 100 Tab 6    metoprolol tartrate (LOPRESSOR) 50 mg tablet TAKE ONE TABLET BY MOUTH 2 TIMES A  Tab 22    alendronate (FOSAMAX) 70 mg tablet Take 1 Tab by mouth every seven (7) days. 12 Tab 3    L. ACIDOPHILUS/STREPT/LA P-AZAEL (MICHAEL-Q PO) Take 1 Cap by mouth nightly.  acetaminophen (TYLENOL) 500 mg tablet Take 2 Tabs by mouth every six (6) hours. 100 Tab 0    aspirin 81 mg chewable tablet Take 1 Tab by mouth daily. 30 Tab 0    CALCIUM CARBONATE (OYSTER SHELL CALCIUM 500 PO) Take 1 Tab by mouth daily.  latanoprost (XALATAN) 0.005 % ophthalmic solution Administer 1 Drop to both eyes nightly.  timolol (TIMOPTIC) 0.5 % ophthalmic solution Administer 1 Drop to right eye two (2) times a day.  lisinopril (PRINIVIL, ZESTRIL) 5 mg tablet TAKE ONE TABLET BY MOUTH EVERY DAY 90 Tab 3    VITAMIN D3 1,000 unit tablet TAKE 4 TABLETS BY MOUTH EVERY  Tab 3    levETIRAcetam (KEPPRA) 250 mg tablet Take 250 mg by mouth two (2) times a day.  SENNA-DOCUSATE SODIUM PO Take  by mouth.  wheat dextrin (BENEFIBER SUGAR FREE, DEXTRIN,) 3 gram/3.8 gram powd Take  by mouth daily. No facility-administered medications prior to visit. Imaging:  MRI Results (most recent):    Results from Hospital Encounter encounter on 06/09/17   MRI [facility] SPINE WO CONT   Narrative EXAM:  MRI [facility] SPINE WO CONT    INDICATION:  T/L-spine trauma, significant injury suspected, +/- localizing  signs. Low back pain    COMPARISON: 11/15/2016    TECHNIQUE: MR imaging of the thoracic spine was performed using the following  sequences: sagittal T1, T2, stir; axial T1, T2.     CONTRAST: None. FINDINGS:    Mild mid thoracic kyphosis is noted. There is no significant subluxation. . There  is a severe, chronic compression fracture at T7. There is an acute, severe  inferior and superior endplate compression fracture at T12. There is only  minimal bulging of the posterior inferior margin of the fracture. No evidence of  extension into the pedicles or posterior elements is seen. . Mild osteophytic  bony endplate changes are noted at multiple levels. . No concerning signal  changes are seen in the visualized paraspinal soft tissues. The course, caliber, and signal intensity of the spinal cord are normal.     A large hiatal hernia is redemonstrated. Multiple minimal disc bulges and/or protrusions are noted. The largest of these  are a left paracentral disc protrusion at T5-T6 and a right paracentral disc  protrusion at T4-T5. Both of these partially efface the lateral recesses. Minimal bulging of the posterior inferior margin of the T12 fracture causes no  significant narrowing of the canal. There is no significant spinal canal  stenosis throughout the thoracic spine. Impression IMPRESSION:    1. Acute, severe T12 compression fracture. Minimal bulging of the posterior  inferior margin of the fracture, however this does not cause significant spinal  canal stenosis. No evidence of extension of the fracture into the pedicles or  posterior elements. 2. Chronic T7 compression fracture. 3. Mild to mild/moderate thoracic spondylosis. 23X  [phone]           CT Results (most recent):    Results from Hospital Encounter encounter on 11/15/16   CT HEAD WO CONT   Narrative EXAM:  CT HEAD WITHOUT CONTRAST    INDICATION: Found on the floor after fall. COMPARISON: 3/31/2016. CONTRAST: None. TECHNIQUE: Unenhanced CT of the head was performed using 5 mm images. Brain and  bone windows were generated. Sagittal and coronal reformations were generated. CT dose reduction was achieved through use of a standardized protocol tailored  for this examination and automatic exposure control for dose modulation. CT dose  reduction was achieved through use of a standardized protocol tailored for this  examination and automatic exposure control for dose modulation. Adaptive  statistical iterative reconstruction (ASIR) was utilized for this examination. FINDINGS:  The ventricles and sulci are normal in size, shape and configuration and  midline. There is atherosclerotic calcification of the vertebral arteries.   There is patchy decreased attenuation in the periventricular white matter which  is nonspecific but consistent with small vessel disease. There is no  intracranial hemorrhage. There is no extra-axial collection, mass, mass effect  or midline shift. The basilar cisterns are open. No acute infarct is  identified. The bone windows demonstrate no abnormalities. The visualized  portions of the paranasal sinuses and mastoid air cells are clear. Impression IMPRESSION: No hemorrhage or acute intracranial abnormality. Microvascular  disease unchanged. [facility]    Lab Review   Results for orders placed or performed in visit on 60/09/23   METABOLIC PANEL, COMPREHENSIVE   Result Value Ref Range    Glucose 84 65 - 99 mg/dL    BUN 15 8 - 27 mg/dL    Creatinine 0.95 0.57 - 1.00 mg/dL    GFR est non-AA 55 (L) >59 mL/min/1.73    GFR est AA 64 >59 mL/min/1.73    BUN/Creatinine ratio 16 12 - 28    Sodium 138 134 - 144 mmol/L    Potassium 5.3 (H) 3.5 - 5.2 mmol/L    Chloride 98 96 - 106 mmol/L    CO2 23 18 - 29 mmol/L    Calcium 10.0 8.7 - 10.3 mg/dL    Protein, total 7.6 6.0 - 8.5 g/dL    Albumin 4.8 (H) 3.5 - 4.7 g/dL    GLOBULIN, TOTAL 2.8 1.5 - 4.5 g/dL    A-G Ratio 1.7 1.2 - 2.2    Bilirubin, total 0.7 0.0 - 1.2 mg/dL    Alk.  phosphatase 77 39 - 117 IU/L    AST (SGOT) 23 0 - 40 IU/L    ALT (SGPT) 16 0 - 32 IU/L   IRON PROFILE   Result Value Ref Range    TIBC 403 250 - 450 ug/dL    UIBC 290 118 - 369 ug/dL    Iron 113 27 - 139 ug/dL    Iron % saturation 28 15 - 55 %   CBC WITH AUTOMATED DIFF   Result Value Ref Range    WBC 8.1 3.4 - 10.8 x10E3/uL    RBC 4.61 3.77 - 5.28 x10E6/uL    HGB 14.6 11.1 - 15.9 g/dL    HCT 43.8 34.0 - 46.6 %    MCV 95 79 - 97 fL    MCH 31.7 26.6 - 33.0 pg    MCHC 33.3 31.5 - 35.7 g/dL    RDW 13.9 12.3 - 15.4 %    PLATELET 158 826 - 834 x10E3/uL    NEUTROPHILS 73 Not Estab. %    Lymphocytes 16 Not Estab. %    MONOCYTES 9 Not Estab. %    EOSINOPHILS 1 Not Estab. %    BASOPHILS 1 Not Estab. %    ABS. NEUTROPHILS 5.9 1.4 - 7.0 x10E3/uL    Abs Lymphocytes 1.3 0.7 - 3.1 x10E3/uL    ABS. MONOCYTES 0.8 0.1 - 0.9 x10E3/uL    ABS. EOSINOPHILS 0.1 0.0 - 0.4 x10E3/uL    ABS. BASOPHILS 0.1 0.0 - 0.2 x10E3/uL    IMMATURE GRANULOCYTES 0 Not Estab. %    ABS. IMM. GRANS. 0.0 0.0 - 0.1 x10E3/uL   CKD REPORT   Result Value Ref Range    Interpretation Note         Exam:  Visit Vitals    /72    Pulse 63    Resp 18    Wt 62.1 kg (137 lb)    SpO2 98%    BMI 33.03 kg/m2     General:  Alert, cooperative, no distress. Head:  Normocephalic, without obvious abnormality, atraumatic. Respiratory:  Heart:   Non labored breathing  Regular rate and rhythm, no murmurs       Extremities: Warm, no cyanosis or edema. Pulses: 2+ radial pulses. Neurologic:  MS: Alert, speech intact. Language intact. Attention and fund of knowledge appropriate. Recent and remote memory intact.   Cranial Nerves:  II: visual fields VFF   II: pupils    II: optic disc    III,VII: ptosis none   III,IV,VI: extraocular muscles  EOMI, no nystagmus or diplopia   V: facial light touch sensation     VII: facial muscle function   symmetric   VIII: hearing intact   IX: soft palate elevation     XI: trapezius strength     XI: sternocleidomastoid strength    XII: tongue       Motor: normal bulk and tone, no tremor              Strength: 5/5 throughout, no PD  Coordination: FTN intact  Gait: normal gait  Reflexes: 2+ symmetric    Mini Mental State Exam 7/5/2017 1/5/2017   What is the Year 1 1   What is the Season 1 1   What is the Date 1 1   What is the Day 1 1   What is the Month 1 1   Where are we State 1 1   Where are we Country 1 1   Where are we [place] [facility] 1 1   Whree are we Floor 1 1   Name three objects, then ask the patient to say them 3 3   Serial sevens Subtract 7 from 100 in increments 5 5   Ask for the three objects repeated above 3 3   Name a pencil 1 1   Name a watch 1 1   Have the patient repeat this phrase \"No ifs, ands, or buts\" 1 1   Three stage command: Take the paper in your right hand 1 1   Fold the paper in half 1 1   Put the paper on the floor 1 1   Read and obey the following: CLOSE YOUR EYES 1 1   Have the patient write a sentence 1 1   Have the patient copy a figure 1 1   Mini Mental Score 30 30          Assessment/Plan   Pt is an 80 y.o. RH female with probable epilepsy, hospitalized at [facility] 11/15-19/16 for episode of LOC at home resulting in a compression fracture at T7 and tongue trauma, with 2 prior episodes of unexplained LOC, the last in April, 2016 and associated with tongue trauma at that time as well. Additionally, MRI of the brain 11/15/16 with tiny acute infarct in the right deep white matter. EEG 11/15/16 was normal. Started on Keppra 250mg bid empirically for seizures without episode of LOC since. Exam is non-focal.   Continue Keppra 250mg bid. Follow-up in clinic in 6 months, instructed to call me interim with any questions or concerns. ICD-10-CM ICD-9-CM    1. Seizures ([facility] [address] R56.9 780.39        Signed:   Dakotah Knox MD  1/5/2018

## 2018-01-05 NOTE — PATIENT INSTRUCTIONS

## 2018-01-16 ENCOUNTER — OFFICE VISIT (OUTPATIENT)
Dept: CARDIOLOGY CLINIC | Age: 83
End: 2018-01-16

## 2018-01-16 DIAGNOSIS — I63.9 CEREBROVASCULAR ACCIDENT (CVA), UNSPECIFIED MECHANISM (HCC): Primary | ICD-10-CM

## 2018-02-19 ENCOUNTER — OFFICE VISIT (OUTPATIENT)
Dept: CARDIOLOGY CLINIC | Age: 83
End: 2018-02-19

## 2018-02-19 DIAGNOSIS — I63.9 CEREBROVASCULAR ACCIDENT (CVA), UNSPECIFIED MECHANISM (HCC): Primary | ICD-10-CM

## 2018-03-09 DIAGNOSIS — D50.0 IRON DEFICIENCY ANEMIA DUE TO CHRONIC BLOOD LOSS: ICD-10-CM

## 2018-03-09 DIAGNOSIS — E78.00 HYPERCHOLESTEREMIA: ICD-10-CM

## 2018-03-09 DIAGNOSIS — I10 HTN (HYPERTENSION), BENIGN: ICD-10-CM

## 2018-03-09 DIAGNOSIS — I63.9 CEREBROVASCULAR ACCIDENT (CVA), UNSPECIFIED MECHANISM (HCC): ICD-10-CM

## 2018-03-09 RX ORDER — LEVETIRACETAM 250 MG/1
TABLET ORAL
Qty: 180 TAB | Refills: 1 | Status: SHIPPED | OUTPATIENT
Start: 2018-03-09 | End: 2018-09-10 | Stop reason: SDUPTHER

## 2018-03-09 RX ORDER — OMEPRAZOLE 20 MG/1
CAPSULE, DELAYED RELEASE ORAL
Qty: 90 CAP | Refills: 0 | Status: SHIPPED | OUTPATIENT
Start: 2018-03-09 | End: 2018-07-13 | Stop reason: SDUPTHER

## 2018-03-09 RX ORDER — AMLODIPINE BESYLATE 5 MG/1
TABLET ORAL
Qty: 90 TAB | Refills: 0 | Status: SHIPPED | OUTPATIENT
Start: 2018-03-09 | End: 2018-06-11 | Stop reason: SDUPTHER

## 2018-03-21 RX ORDER — ALBUTEROL SULFATE 90 UG/1
AEROSOL, METERED RESPIRATORY (INHALATION)
Qty: 1 INHALER | Refills: 5 | Status: SHIPPED | OUTPATIENT
Start: 2018-03-21 | End: 2018-12-07

## 2018-03-27 ENCOUNTER — OFFICE VISIT (OUTPATIENT)
Dept: CARDIOLOGY CLINIC | Age: 83
End: 2018-03-27

## 2018-03-27 DIAGNOSIS — I63.9 CEREBROVASCULAR ACCIDENT (CVA), UNSPECIFIED MECHANISM (HCC): Primary | ICD-10-CM

## 2018-03-29 ENCOUNTER — HOSPITAL ENCOUNTER (OUTPATIENT)
Dept: LAB | Age: 83
Discharge: HOME | End: 2018-03-29
Payer: MEDICARE

## 2018-03-29 PROCEDURE — 36415 COLL VENOUS BLD VENIPUNCTURE: CPT

## 2018-03-29 PROCEDURE — 80053 COMPREHEN METABOLIC PANEL: CPT

## 2018-03-29 PROCEDURE — 85025 COMPLETE CBC W/AUTO DIFF WBC: CPT

## 2018-03-29 PROCEDURE — 80061 LIPID PANEL: CPT

## 2018-03-30 LAB
ALBUMIN SERPL-MCNC: 4.6 G/DL (ref 3.5–4.7)
ALBUMIN/GLOB SERPL: 2 {RATIO} (ref 1.2–2.2)
ALP SERPL-CCNC: 72 IU/L (ref 39–117)
ALT SERPL-CCNC: 17 IU/L (ref 0–32)
AST SERPL-CCNC: 24 IU/L (ref 0–40)
BASOPHILS # BLD AUTO: 0.1 X10E3/UL (ref 0–0.2)
BASOPHILS NFR BLD AUTO: 1 %
BILIRUB SERPL-MCNC: 0.6 MG/DL (ref 0–1.2)
BUN SERPL-MCNC: 14 MG/DL (ref 8–27)
BUN/CREAT SERPL: 17 (ref 12–28)
CALCIUM SERPL-MCNC: 9.6 MG/DL (ref 8.7–10.3)
CHLORIDE SERPL-SCNC: 93 MMOL/L (ref 96–106)
CHOLEST SERPL-MCNC: 182 MG/DL (ref 100–199)
CO2 SERPL-SCNC: 24 MMOL/L (ref 18–29)
CREAT SERPL-MCNC: 0.83 MG/DL (ref 0.57–1)
EOSINOPHIL # BLD AUTO: 0.1 X10E3/UL (ref 0–0.4)
EOSINOPHIL NFR BLD AUTO: 2 %
ERYTHROCYTE [DISTWIDTH] IN BLOOD BY AUTOMATED COUNT: 14.2 % (ref 12.3–15.4)
GFR SERPLBLD CREATININE-BSD FMLA CKD-EPI: 65 ML/MIN/1.73
GFR SERPLBLD CREATININE-BSD FMLA CKD-EPI: 75 ML/MIN/1.73
GLOBULIN SER CALC-MCNC: 2.3 G/DL (ref 1.5–4.5)
GLUCOSE SERPL-MCNC: 76 MG/DL (ref 65–99)
HCT VFR BLD AUTO: 41.5 % (ref 34–46.6)
HDLC SERPL-MCNC: 91 MG/DL
HGB BLD-MCNC: 14.3 G/DL (ref 11.1–15.9)
IMM GRANULOCYTES # BLD: 0 X10E3/UL (ref 0–0.1)
IMM GRANULOCYTES NFR BLD: 0 %
INTERPRETATION, 910389: NORMAL
LDLC SERPL CALC-MCNC: 68 MG/DL (ref 0–99)
LYMPHOCYTES # BLD AUTO: 1.5 X10E3/UL (ref 0.7–3.1)
LYMPHOCYTES NFR BLD AUTO: 19 %
MCH RBC QN AUTO: 31.6 PG (ref 26.6–33)
MCHC RBC AUTO-ENTMCNC: 34.5 G/DL (ref 31.5–35.7)
MCV RBC AUTO: 92 FL (ref 79–97)
MONOCYTES # BLD AUTO: 0.9 X10E3/UL (ref 0.1–0.9)
MONOCYTES NFR BLD AUTO: 12 %
NEUTROPHILS # BLD AUTO: 5.3 X10E3/UL (ref 1.4–7)
NEUTROPHILS NFR BLD AUTO: 66 %
PLATELET # BLD AUTO: 344 X10E3/UL (ref 150–379)
POTASSIUM SERPL-SCNC: 4.8 MMOL/L (ref 3.5–5.2)
PROT SERPL-MCNC: 6.9 G/DL (ref 6–8.5)
RBC # BLD AUTO: 4.52 X10E6/UL (ref 3.77–5.28)
SODIUM SERPL-SCNC: 135 MMOL/L (ref 134–144)
TRIGL SERPL-MCNC: 114 MG/DL (ref 0–149)
VLDLC SERPL CALC-MCNC: 23 MG/DL (ref 5–40)
WBC # BLD AUTO: 7.9 X10E3/UL (ref 3.4–10.8)

## 2018-04-03 NOTE — TELEPHONE ENCOUNTER
Writer has received approval for [employer] and fax to [phone] [phone]. Approval dates: 12-30-17 to 12-31-18.

## 2018-04-04 ENCOUNTER — OFFICE VISIT (OUTPATIENT)
Dept: INTERNAL MEDICINE CLINIC | Age: 83
End: 2018-04-04

## 2018-04-04 VITALS
DIASTOLIC BLOOD PRESSURE: 60 MMHG | BODY MASS INDEX: 31.01 KG/M2 | SYSTOLIC BLOOD PRESSURE: 122 MMHG | RESPIRATION RATE: 16 BRPM | OXYGEN SATURATION: 97 % | HEART RATE: 64 BPM | WEIGHT: 134 LBS | HEIGHT: 55 IN

## 2018-04-04 DIAGNOSIS — M80.00XD OSTEOPOROSIS WITH CURRENT PATHOLOGICAL FRACTURE WITH ROUTINE HEALING, UNSPECIFIED OSTEOPOROSIS TYPE, SUBSEQUENT ENCOUNTER: ICD-10-CM

## 2018-04-04 DIAGNOSIS — I10 HTN (HYPERTENSION), BENIGN: Primary | ICD-10-CM

## 2018-04-04 DIAGNOSIS — D50.0 IRON DEFICIENCY ANEMIA DUE TO CHRONIC BLOOD LOSS: ICD-10-CM

## 2018-04-04 DIAGNOSIS — G56.92 COMPRESSION NEUROPATHY OF UPPER EXTREMITY, LEFT: ICD-10-CM

## 2018-04-04 DIAGNOSIS — I51.89 DIASTOLIC DYSFUNCTION: ICD-10-CM

## 2018-04-04 RX ORDER — ALENDRONATE SODIUM 70 MG/1
70 TABLET ORAL
Qty: 12 TAB | Refills: 1 | Status: SHIPPED | OUTPATIENT
Start: 2018-04-04 | End: 2018-06-26 | Stop reason: SDUPTHER

## 2018-04-04 RX ORDER — CHOLECALCIFEROL (VITAMIN D3) 125 MCG
CAPSULE ORAL
COMMUNITY

## 2018-04-04 NOTE — MR AVS SNAPSHOT
[address] [place] [address] [address] [facility] 
[phone] Patient: Umair Yu MRN: [id number] PVR:0/40/2319 Visit Information Date & Time Provider Department Dept. Phone Encounter #  
 4/4/2018  3:00 PM Estephania Soto MD [facility] [phone] [id number] Your Appointments 7/6/2018  1:40 PM  
Follow Up with Chica Marino MD  
[facility] [address] [place] [address]) Appt Note: 6 month f/u seizure [facility] [address]  
[phone]  
  
   
 [address] [place] [address] Upcoming Health Maintenance Date Due ZOSTER VACCINE AGE 60> 3/23/1993 GLAUCOMA SCREENING Q2Y 6/9/2017 MEDICARE YEARLY EXAM 4/29/2018 DTaP/Tdap/Td series (2 - Td) 3/31/2026 Allergies as of 4/4/2018  Review Complete On: 1/5/2018 By: Chica Marino MD  
 No Known Allergies Current Immunizations  Reviewed on 11/15/2016 Name Date Influenza High Dose Vaccine PF 10/13/2015 Influenza Vaccine 10/2/2013 Influenza Vaccine (Quad) PF 9/15/2014 Pneumococcal Conjugate (PCV-13) 11/2/2015 Pneumococcal Polysaccharide (PPSV-23) 9/23/2013 Tdap 3/31/2016  3:27 AM  
  
 Not reviewed this visit You Were Diagnosed With   
  
 Codes Comments HTN (hypertension), benign    -  Primary ICD-10-CM: I10 
ICD-9-CM: 401.1 Diastolic dysfunction     KRISTIAN-22-FS: I51.9 ICD-9-CM: 429.9 Iron deficiency anemia due to chronic blood loss     ICD-10-CM: D50.0 ICD-9-CM: 280.0 Osteoporosis with current pathological fracture with routine healing, unspecified osteoporosis type, subsequent encounter     ICD-10-CM: M80.00XD ICD-9-CM: V54.29, 733.00 Vitals BP Pulse Resp Height(growth percentile) Weight(growth percentile) SpO2  
 122/60 64 16 4' 6\" (1.372 m) 134 lb (60.8 kg) 97% BMI OB Status Smoking Status 32.31 kg/m2 Postmenopausal Former Smoker Vitals History BMI and BSA Data Body Mass Index Body Surface Area  
 32.31 kg/m 2 1.52 m 2 Preferred Pharmacy Pharmacy Name Phone [facility] [phone] Your Updated Medication List  
  
   
This list is accurate as of 4/4/18  3:46 PM.  Always use your most recent med list.  
  
  
  
  
 acetaminophen 500 mg tablet Commonly known as:  TYLENOL Take 2 Tabs by mouth every six (6) hours. * albuterol 90 mcg/actuation inhaler Commonly known as:  PROAIR HFA Take 2 Puffs by inhalation every four (4) hours as needed for Wheezing or Shortness of Breath. Ventolin brand requested * PROAIR HFA 90 mcg/actuation inhaler Generic drug:  albuterol INHALE 2 PUFFS BY MOUTH EVERY 4 HOURS AS NEEDED FOR WHEEZING OR SHORTNESS OF BREATH  
  
 alendronate 70 mg tablet Commonly known as:  FOSAMAX Take 1 Tab by mouth every seven (7) days. amLODIPine 5 mg tablet Commonly known as:  Paytona Couch TAKE ONE TABLET BY MOUTH EVERY DAY  
  
 aspirin 81 mg chewable tablet Take 1 Tab by mouth daily. atorvastatin 20 mg tablet Commonly known as:  LIPITOR  
TAKE ONE TABLET BY MOUTH EVERY DAY  
  
 BENEFIBER SUGAR FREE (DEXTRIN) 3 gram/3.8 gram Powd Generic drug:  wheat dextrin Take  by mouth daily. ferrous sulfate 325 mg (65 mg iron) tablet Take 1 Tab by mouth every fourty-eight (48) hours. Indications: Iron Deficiency Anemia MICHAEL-Q PO Take 1 Cap by mouth nightly. latanoprost 0.005 % ophthalmic solution Commonly known as:  Funmilayo Ortiz Administer 1 Drop to both eyes nightly. levETIRAcetam 250 mg tablet Commonly known as:  KEPPRA TAKE ONE TABLET BY MOUTH 2 TIMES A DAY  
  
 lisinopril 5 mg tablet Commonly known as:  PRINIVIL, ZESTRIL  
TAKE ONE TABLET BY MOUTH EVERY DAY  
  
 metoprolol tartrate 50 mg tablet Commonly known as:  LOPRESSOR  
 TAKE ONE TABLET BY MOUTH 2 TIMES A DAY  
  
 omeprazole 20 mg capsule Commonly known as:  PRILOSEC  
TAKE ONE CAPSULE BY MOUTH ONCE DAILY ONE-HALF HOUR BEFORE A MEAL FOR STOMACH ACID OYSTER SHELL CALCIUM 500 PO Take 1 Tab by mouth daily. timolol 0.5 % ophthalmic solution Commonly known as:  TIMOPTIC Administer 1 Drop to right eye two (2) times a day. VITAMIN D3 2,000 unit Tab Generic drug:  cholecalciferol (vitamin D3) Take  by mouth. * Notice: This list has 2 medication(s) that are the same as other medications prescribed for you. Read the directions carefully, and ask your doctor or other care provider to review them with you. Prescriptions Sent to Pharmacy Refills  
 alendronate (FOSAMAX) 70 mg tablet 1 Sig: Take 1 Tab by mouth every seven (7) days. Class: Normal  
 Pharmacy: [address] [place] [address] 18, [address] #: [phone] Route: Oral  
  
We Performed the Following CBC WITH AUTOMATED DIFF [39080 CPT(R)] IRON PROFILE [id number] CPT(R)] LIPID PANEL [53032 CPT(R)] METABOLIC PANEL, COMPREHENSIVE [92059 CPT(R)] Introducing [facility]! Natividad Lopez introduces [employer] patient portal. Now you can access parts of your medical record, email your doctor's office, and request medication refills online. 1. In your internet browser, go to [email or web address] 2. Click on the First Time User? Click Here link in the Sign In box. You will see the New Member Sign Up page. 3. Enter your [employer] Access Code exactly as it appears below. You will not need to use this code after youve completed the sign-up process. If you do not sign up before the expiration date, you must request a new code. · [employer] Access Code: [email or web address] Expires: 5/20/2018  5:24 PM 
 
4.  Enter the last four digits of your Social Security Number (xxxx) and Date of Birth (mm/dd/yyyy) as indicated and click Submit. You will be taken to the next sign-up page. 5. Create a [employer] ID. This will be your [employer] login ID and cannot be changed, so think of one that is secure and easy to remember. 6. Create a [employer] password. You can change your password at any time. 7. Enter your Password Reset Question and Answer. This can be used at a later time if you forget your password. 8. Enter your e-mail address. You will receive e-mail notification when new information is available in [address] 9. Click Sign Up. You can now view and download portions of your medical record. 10. Click the Download Summary menu link to download a portable copy of your medical information. If you have questions, please visit the Frequently Asked Questions section of the [employer] website. Remember, [employer] is NOT to be used for urgent needs. For medical emergencies, dial 911. Now available from your iPhone and Android! Please provide this summary of care documentation to your next provider. Your primary care clinician is listed as Maggie Sullivan. If you have any questions after today's visit, please call [phone].

## 2018-04-04 NOTE — PROGRESS NOTES
Chief Complaint   Patient presents with    Follow-up     4 mon     Arm Pain    Medication Refill     patient needs ventolin rx in place of proair      Chief Complaint   Patient presents with    Follow-up     4 mon     Arm Pain    Medication Refill     patient needs ventolin rx in place of proair      Subjective: Akash Resendez is a 80 y.o. RH femal        Patient Active Problem List    Diagnosis    [facility]    Stroke ([facility]    HTN (hypertension), benign    Diastolic dysfunction    HEART (dyspnea on exertion)    COPD (chronic obstructive pulmonary disease) ([facility]    Khadar lesion, chronic     Seen on EGD 5/7/13      Iron deficiency anemia    Osteoporosis     Fosamax  2005  No fractures  In all these years      Hypercholesteremia       Denies gerd    Vitals:    04/04/18 1505   BP: 122/60   Pulse: 64   Resp: 16   SpO2: 97%   Weight: 134 lb (60.8 kg)   Height: 4' 6\" (1.372 m)     S1 and S2 normal, no murmurs, clicks, gallops or rubs. Regular rate and rhythm. Chest is clear; no wheezes or rales. No edema or JVD. Neuro intact  Uses a cane for ambulation support and balance         hospitalized at [facility] 11/15-19/16 for episode of LOC at home resulting in a compression fracture at T7 and tongue trauma, with 2 prior episodes of unexplained LOC, the last in April and associated with tongue trauma at that time as well. Additionally, MRI of the brain with tiny acute infarct in the right deep white matter. Exam revealed upgoing toe on the left, otherwise unremarkable. EEG was normal. Episodes of LOC with tongue trauma were concerning for seizure. No known seizure risk factors other than age. Family denied concerns about dementia. Recommended starting Keppra 250 mg po bid given high risk for injury with subsequent seizures. The stroke seen on MRI would not have been responsible for LOC or seizure. It is hard to know if the two are related.  She was started on aspirin 81 mg a day and recommended continued good control of her stroke risk factors. She was also seen by Cardiology and discharged with a loop recorder. (negative)  was in rehab at [address] [place] [address] until 12/1/16. She has not had any subsequent episodes of LOC or unusual spells. She does not remember anything from her hospital stay and is repeating herself more. She is able to take her meds correctly everyday and is remembering her family and friends appropriately. Lives independently in a [facility] community. Cooks for herself  Follow up doing well now wioth no pain she is tolerating the fosamax    Loose bowels better with benefiber  Past Surgical History:   Procedure Laterality Date    CARDIAC SURG PROCEDURE UNLIST  2008    nuclear stress test normal     ENDOSCOPY, COLON, DIAGNOSTIC  2008, 2013    HX CATARACT REMOVAL      HX GI  2013    egd   kia ulcer    HX ORTHOPAEDIC  2010    knee replacement    IMPLANT  LOOP RECORDER  11/17/2016          Current Outpatient Prescriptions   Medication Sig Dispense Refill    cholecalciferol, vitamin D3, (VITAMIN D3) 2,000 unit tab Take  by mouth.  PROAIR HFA 90 mcg/actuation inhaler INHALE 2 PUFFS BY MOUTH EVERY 4 HOURS AS NEEDED FOR WHEEZING OR SHORTNESS OF BREATH 1 Inhaler 5    amLODIPine (NORVASC) 5 mg tablet TAKE ONE TABLET BY MOUTH EVERY DAY 90 Tab 0    levETIRAcetam (KEPPRA) 250 mg tablet TAKE ONE TABLET BY MOUTH 2 TIMES A  Tab 1    omeprazole (PRILOSEC) 20 mg capsule TAKE ONE CAPSULE BY MOUTH ONCE DAILY ONE-HALF HOUR BEFORE A MEAL FOR STOMACH ACID 90 Cap 0    atorvastatin (LIPITOR) 20 mg tablet TAKE ONE TABLET BY MOUTH EVERY DAY 90 Tab 3    alendronate (FOSAMAX) 70 mg tablet Take 1 Tab by mouth every seven (7) days. 12 Tab 1    lisinopril (PRINIVIL, ZESTRIL) 5 mg tablet TAKE ONE TABLET BY MOUTH EVERY DAY 90 Tab 3    albuterol (PROAIR HFA) 90 mcg/actuation inhaler Take 2 Puffs by inhalation every four (4) hours as needed for Wheezing or Shortness of Breath. Ventolin brand requested 1 Inhaler 6    ferrous sulfate 325 mg (65 mg iron) tablet Take 1 Tab by mouth every fourty-eight (48) hours. Indications: Iron Deficiency Anemia 100 Tab 6    metoprolol tartrate (LOPRESSOR) 50 mg tablet TAKE ONE TABLET BY MOUTH 2 TIMES A  Tab 22    acetaminophen (TYLENOL) 500 mg tablet Take 2 Tabs by mouth every six (6) hours. 100 Tab 0    aspirin 81 mg chewable tablet Take 1 Tab by mouth daily. 30 Tab 0    wheat dextrin (BENEFIBER SUGAR FREE, DEXTRIN,) 3 gram/3.8 gram powd Take  by mouth daily.  L. ACIDOPHILUS/STREPT/LA P-AZAEL (MICHAEL-Q PO) Take 1 Cap by mouth nightly.  CALCIUM CARBONATE (OYSTER SHELL CALCIUM 500 PO) Take 1 Tab by mouth daily.  latanoprost (XALATAN) 0.005 % ophthalmic solution Administer 1 Drop to both eyes nightly.  timolol (TIMOPTIC) 0.5 % ophthalmic solution Administer 1 Drop to right eye two (2) times a day.        No Known Allergies  Family History   Problem Relation Age of Onset    Heart Disease Mother     Heart Disease Father     Heart Disease Sister     Alzheimer Sister     Alcohol abuse Brother      Social History   Substance Use Topics    Smoking status: Former Smoker     Years: 30.00     Types: Cigarettes     Quit date: 9/21/2000    Smokeless tobacco: Never Used    Alcohol use No     Patient Active Problem List   Diagnosis Code    Osteoporosis M81.0    Hypercholesteremia E78.00    Iron deficiency anemia D50.9    Khadar lesion, chronic K25.7    HEART (dyspnea on exertion) R06.09    COPD (chronic obstructive pulmonary disease) (HCC) C43.3    Diastolic dysfunction Z40.3    HTN (hypertension), benign I10    Stroke ([facility] I63.9    Cerebrovascular accident (CVA) ([facility] I63.9       Depression Risk Factor Screening:     PHQ over the last two weeks 4/4/2018   Little interest or pleasure in doing things Not at all   Feeling down, depressed or hopeless Not at all   Total Score PHQ 2 0     Alcohol Risk Factor Screening: Functional Ability and Level of Safety:     Hearing Loss   mild    Activities of Daily Living   Self-care. Requires assistance with: no ADLs    Fall Risk     Fall Risk Assessment, last 12 mths 4/4/2018   Able to walk? Yes   Fall in past 12 months? No     Abuse Screen   Patient is not abused    Review of Systems   Pertinent items are noted in HPI. Physical Examination     Evaluation of Cognitive Function:  Mood/affect:  happy  Appearance: age appropriate  Family member/caregiver input: family helps a lot  No longer driving    Left arm tingling  This morning  For 90 minutes today    Visit Vitals    /60    Pulse 64    Resp 16    Ht 4' 6\" (1.372 m)    Wt 134 lb (60.8 kg)    SpO2 97%    BMI 32.31 kg/m2     General appearance: alert, cooperative, no distress, appears stated age  Lungs: clear to auscultation bilaterally  Heart: regular rate and rhythm, S1, S2 normal, no murmur, click, rub or gallop  Neurologic: Grossly normal  Saw neuro and note reviewed    Lab Results   Component Value Date/Time    WBC 7.9 03/29/2018 01:21 PM    HGB 14.3 03/29/2018 01:21 PM    Hemoglobin (POC) 11.6 07/10/2013 09:09 PM    HCT 41.5 03/29/2018 01:21 PM    Hematocrit (POC) 34 (L) 07/10/2013 09:09 PM    PLATELET 095 28/57/7303 01:21 PM    MCV 92 03/29/2018 01:21 PM     Lab Results   Component Value Date/Time    Cholesterol, total 182 03/29/2018 01:21 PM    HDL Cholesterol 91 03/29/2018 01:21 PM    LDL, calculated 68 03/29/2018 01:21 PM    Triglyceride 114 03/29/2018 01:21 PM    CHOL/HDL Ratio 1.9 11/16/2016 03:53 AM     Lab Results   Component Value Date/Time    ALT (SGPT) 17 03/29/2018 01:21 PM    AST (SGOT) 24 03/29/2018 01:21 PM    Alk.  phosphatase 72 03/29/2018 01:21 PM    Bilirubin, direct 0.1 01/06/2010 09:58 AM    Bilirubin, total 0.6 03/29/2018 01:21 PM    Albumin 4.6 03/29/2018 01:21 PM    Protein, total 6.9 03/29/2018 01:21 PM    INR 1.0 11/15/2016 10:10 AM    Prothrombin time 10.3 11/15/2016 10:10 AM PLATELET 702 59/18/7071 01:21 PM       Lab Results   Component Value Date/Time    GFR est non-AA 65 03/29/2018 01:21 PM    GFRNA, POC 39 (L) 07/10/2013 09:09 PM    GFR est AA 75 03/29/2018 01:21 PM    GFRAA, POC 48 (L) 07/10/2013 09:09 PM    Creatinine 0.83 03/29/2018 01:21 PM    Creatinine (POC) 1.3 07/10/2013 09:09 PM    BUN 14 03/29/2018 01:21 PM    BUN (POC) 23 (H) 07/10/2013 09:09 PM    Sodium 135 03/29/2018 01:21 PM    Sodium (POC) 137 07/10/2013 09:09 PM    Potassium 4.8 03/29/2018 01:21 PM    Potassium (POC) 3.8 07/10/2013 09:09 PM    Chloride 93 (L) 03/29/2018 01:21 PM    Chloride (POC) 104 07/10/2013 09:09 PM    CO2 24 03/29/2018 01:21 PM    Magnesium 1.8 11/27/2016 04:20 AM    Phosphorus 4.2 11/27/2016 04:20 AM     Lab Results   Component Value Date/Time    Iron 113 11/10/2017 11:12 AM    TIBC 403 11/10/2017 11:12 AM    Iron % saturation 28 11/10/2017 11:12 AM    Ferritin 120 07/05/2017 01:39 PM           Patient Care Team:  Oleksandr Wong MD as PCP - Argentina Toro MD (Cardiology)  Dain Ernandez MD as Physician (Neurology)    Advice/Referrals/Counseling   Education and counseling provided:  Are appropriate based on today's review and evaluation  End-of-Life planning (with patient's consent)  Pneumococcal Vaccine  Influenza Vaccine      Assessment/Plan       1. HTN (hypertension), benign  controlled    2. Iron deficiency anemia due to chronic blood loss     Every other daily iron    3. Hypercholesteremia  controlled    4. Osteoporosis with current pathological fracture with routine healing, unspecified osteoporosis type, subsequent encounter  Doing ok on meds    5. Seizure ([address]  stable    6. Cerebrovascular accident (CVA), unspecified mechanism ([address]  Has recovered  7 asthma stable uses prn albuterol    8 tingling left arm today likely   Compression neuropathy resolved      Diagnoses and all orders for this visit:    1.  HTN (hypertension), benign  -     LIPID PANEL  -     METABOLIC PANEL, COMPREHENSIVE    2. Diastolic dysfunction    3. Iron deficiency anemia due to chronic blood loss  -     CBC WITH AUTOMATED DIFF  -     IRON PROFILE    4. Osteoporosis with current pathological fracture with routine healing, unspecified osteoporosis type, subsequent encounter  -     alendronate (FOSAMAX) 70 mg tablet; Take 1 Tab by mouth every seven (7) days.     5. Compression neuropathy of upper extremity, left      See 4 months

## 2018-05-01 ENCOUNTER — OFFICE VISIT (OUTPATIENT)
Dept: CARDIOLOGY CLINIC | Age: 83
End: 2018-05-01

## 2018-05-01 DIAGNOSIS — I63.9 CEREBROVASCULAR ACCIDENT (CVA), UNSPECIFIED MECHANISM (HCC): Primary | ICD-10-CM

## 2018-06-05 ENCOUNTER — OFFICE VISIT (OUTPATIENT)
Dept: CARDIOLOGY CLINIC | Age: 83
End: 2018-06-05

## 2018-06-05 DIAGNOSIS — Z95.818 STATUS POST PLACEMENT OF IMPLANTABLE LOOP RECORDER: Primary | ICD-10-CM

## 2018-06-26 DIAGNOSIS — M80.00XD OSTEOPOROSIS WITH CURRENT PATHOLOGICAL FRACTURE WITH ROUTINE HEALING, UNSPECIFIED OSTEOPOROSIS TYPE, SUBSEQUENT ENCOUNTER: ICD-10-CM

## 2018-06-27 RX ORDER — ALENDRONATE SODIUM 70 MG/1
70 TABLET ORAL
Qty: 12 TAB | Refills: 1 | Status: SHIPPED | OUTPATIENT
Start: 2018-06-27 | End: 2020-07-30

## 2018-07-13 DIAGNOSIS — I10 HTN (HYPERTENSION), BENIGN: ICD-10-CM

## 2018-07-13 DIAGNOSIS — E78.00 HYPERCHOLESTEREMIA: ICD-10-CM

## 2018-07-13 DIAGNOSIS — I63.9 CEREBROVASCULAR ACCIDENT (CVA), UNSPECIFIED MECHANISM (HCC): ICD-10-CM

## 2018-07-13 DIAGNOSIS — D50.0 IRON DEFICIENCY ANEMIA DUE TO CHRONIC BLOOD LOSS: ICD-10-CM

## 2018-07-14 RX ORDER — OMEPRAZOLE 20 MG/1
CAPSULE, DELAYED RELEASE ORAL
Qty: 90 CAP | Refills: 0 | Status: SHIPPED | OUTPATIENT
Start: 2018-07-14 | End: 2018-11-14 | Stop reason: SDUPTHER

## 2018-08-01 ENCOUNTER — HOSPITAL ENCOUNTER (OUTPATIENT)
Dept: LAB | Age: 83
Discharge: HOME | End: 2018-08-01
Payer: MEDICARE

## 2018-08-01 PROCEDURE — 80061 LIPID PANEL: CPT

## 2018-08-01 PROCEDURE — 85025 COMPLETE CBC W/AUTO DIFF WBC: CPT

## 2018-08-01 PROCEDURE — 36415 COLL VENOUS BLD VENIPUNCTURE: CPT

## 2018-08-01 PROCEDURE — 83550 IRON BINDING TEST: CPT

## 2018-08-01 PROCEDURE — 80053 COMPREHEN METABOLIC PANEL: CPT

## 2018-08-02 LAB
ALBUMIN SERPL-MCNC: 4.5 G/DL (ref 3.5–4.7)
ALBUMIN/GLOB SERPL: 1.8 {RATIO} (ref 1.2–2.2)
ALP SERPL-CCNC: 67 IU/L (ref 39–117)
ALT SERPL-CCNC: 14 IU/L (ref 0–32)
AST SERPL-CCNC: 21 IU/L (ref 0–40)
BASOPHILS # BLD AUTO: 0.1 X10E3/UL (ref 0–0.2)
BASOPHILS NFR BLD AUTO: 1 %
BILIRUB SERPL-MCNC: 0.5 MG/DL (ref 0–1.2)
BUN SERPL-MCNC: 13 MG/DL (ref 8–27)
BUN/CREAT SERPL: 17 (ref 12–28)
CALCIUM SERPL-MCNC: 9.2 MG/DL (ref 8.7–10.3)
CHLORIDE SERPL-SCNC: 95 MMOL/L (ref 96–106)
CHOLEST SERPL-MCNC: 175 MG/DL (ref 100–199)
CO2 SERPL-SCNC: 21 MMOL/L (ref 20–29)
CREAT SERPL-MCNC: 0.76 MG/DL (ref 0.57–1)
EOSINOPHIL # BLD AUTO: 0.3 X10E3/UL (ref 0–0.4)
EOSINOPHIL NFR BLD AUTO: 3 %
ERYTHROCYTE [DISTWIDTH] IN BLOOD BY AUTOMATED COUNT: 14.5 % (ref 12.3–15.4)
GLOBULIN SER CALC-MCNC: 2.5 G/DL (ref 1.5–4.5)
GLUCOSE SERPL-MCNC: 84 MG/DL (ref 65–99)
HCT VFR BLD AUTO: 41.6 % (ref 34–46.6)
HDLC SERPL-MCNC: 85 MG/DL
HGB BLD-MCNC: 14.1 G/DL (ref 11.1–15.9)
IMM GRANULOCYTES # BLD: 0 X10E3/UL (ref 0–0.1)
IMM GRANULOCYTES NFR BLD: 0 %
INTERPRETATION, 910389: NORMAL
IRON SATN MFR SERPL: 24 % (ref 15–55)
IRON SERPL-MCNC: 81 UG/DL (ref 27–139)
LDLC SERPL CALC-MCNC: 71 MG/DL (ref 0–99)
LYMPHOCYTES # BLD AUTO: 1.5 X10E3/UL (ref 0.7–3.1)
LYMPHOCYTES NFR BLD AUTO: 19 %
MCH RBC QN AUTO: 30.9 PG (ref 26.6–33)
MCHC RBC AUTO-ENTMCNC: 33.9 G/DL (ref 31.5–35.7)
MCV RBC AUTO: 91 FL (ref 79–97)
MONOCYTES # BLD AUTO: 0.6 X10E3/UL (ref 0.1–0.9)
MONOCYTES NFR BLD AUTO: 8 %
NEUTROPHILS # BLD AUTO: 5.1 X10E3/UL (ref 1.4–7)
NEUTROPHILS NFR BLD AUTO: 69 %
PLATELET # BLD AUTO: 298 X10E3/UL (ref 150–379)
POTASSIUM SERPL-SCNC: 4.9 MMOL/L (ref 3.5–5.2)
PROT SERPL-MCNC: 7 G/DL (ref 6–8.5)
RBC # BLD AUTO: 4.56 X10E6/UL (ref 3.77–5.28)
SODIUM SERPL-SCNC: 132 MMOL/L (ref 134–144)
TIBC SERPL-MCNC: 335 UG/DL (ref 250–450)
TRIGL SERPL-MCNC: 94 MG/DL (ref 0–149)
UIBC SERPL-MCNC: 254 UG/DL (ref 118–369)
VLDLC SERPL CALC-MCNC: 19 MG/DL (ref 5–40)
WBC # BLD AUTO: 7.5 X10E3/UL (ref 3.4–10.8)

## 2018-08-08 ENCOUNTER — OFFICE VISIT (OUTPATIENT)
Dept: INTERNAL MEDICINE CLINIC | Age: 83
End: 2018-08-08

## 2018-08-08 VITALS
WEIGHT: 135.2 LBS | TEMPERATURE: 97.8 F | HEART RATE: 90 BPM | RESPIRATION RATE: 20 BRPM | HEIGHT: 55 IN | BODY MASS INDEX: 31.29 KG/M2 | DIASTOLIC BLOOD PRESSURE: 68 MMHG | OXYGEN SATURATION: 95 % | SYSTOLIC BLOOD PRESSURE: 138 MMHG

## 2018-08-08 DIAGNOSIS — I51.89 DIASTOLIC DYSFUNCTION: ICD-10-CM

## 2018-08-08 DIAGNOSIS — D50.0 IRON DEFICIENCY ANEMIA DUE TO CHRONIC BLOOD LOSS: ICD-10-CM

## 2018-08-08 DIAGNOSIS — K25.7: Primary | ICD-10-CM

## 2018-08-08 DIAGNOSIS — M80.00XD OSTEOPOROSIS WITH CURRENT PATHOLOGICAL FRACTURE WITH ROUTINE HEALING, UNSPECIFIED OSTEOPOROSIS TYPE, SUBSEQUENT ENCOUNTER: ICD-10-CM

## 2018-08-08 DIAGNOSIS — Z86.73 HISTORY OF STROKE: ICD-10-CM

## 2018-08-08 DIAGNOSIS — Z00.00 MEDICARE ANNUAL WELLNESS VISIT, SUBSEQUENT: ICD-10-CM

## 2018-08-08 DIAGNOSIS — I10 HTN (HYPERTENSION), BENIGN: ICD-10-CM

## 2018-08-08 NOTE — PATIENT INSTRUCTIONS
Medicare Wellness Visit, Female    The best way to live healthy is to have a lifestyle where you eat a well-balanced diet, exercise regularly, limit alcohol use, and quit all forms of tobacco/nicotine, if applicable. Regular preventive services are another way to keep healthy. Preventive services (vaccines, screening tests, monitoring & exams) can help personalize your care plan, which helps you manage your own care. Screening tests can find health problems at the earliest stages, when they are easiest to treat. [facility] follows the current, evidence-based guidelines published by the [facility]) when recommending preventive services for our patients. Because we follow these guidelines, sometimes recommendations change over time as research supports it. (For example, mammograms used to be recommended annually. Even though Medicare will still pay for an annual mammogram, the newer guidelines recommend a mammogram every two years for women of average risk.)    Of course, you and your provider may decide to screen more often for some diseases, based on your risk and co-morbidities (chronic disease you are already diagnosed with). Preventive services for you include:    - Medicare offers their members a free annual wellness visit, which is time for you and your primary care provider to discuss and plan for your preventive service needs. Take advantage of this benefit every year!    -All people over age 72 should receive the recommended pneumonia vaccines. Current USPSTF guidelines recommend a series of two vaccines for the best pneumonia protection.     -All adults should have a yearly flu vaccine and a tetanus vaccine every 10 years. All adults age 61 years should receive a shingles vaccine once in their lifetime.      -A bone mass density test is recommended when a woman turns 65 to screen for osteoporosis.  This test is only recommended once as a screening. Some providers will use this same test as a disease monitoring tool if you already have osteoporosis. -All adults age 38-68 years who are overweight should have a diabetes screening test once every three years.     -Other screening tests & preventive services for persons with diabetes include: an eye exam to screen for diabetic retinopathy, a kidney function test, a foot exam, and stricter control over your cholesterol.     -Cardiovascular screening for adults with routine risk involves an electrocardiogram (ECG) at intervals determined by the provider.     -Colorectal cancer screenings should be done for adults age 54-65 years with normal risk. There are a number of acceptable methods of screening for this type of cancer. Each test has its own benefits and drawbacks. Discuss with your provider what is most appropriate for you during your annual wellness visit. The different tests include: colonoscopy (considered the best screening method), a fecal occult blood test, a fecal DNA test, and sigmoidoscopy. -Breast cancer screenings are recommended every other year for women of normal risk age 54-69 years.     -Cervical cancer screenings for women over age 72 are only recommended with certain risk factors.     -All adults born between [facility] should be screened once for Hepatitis C.      Here is a list of your current Health Maintenance items (your personalized list of preventive services) with a due date:  Health Maintenance Due   Topic Date Due    Shingles Vaccine  03/23/1993    Flu Vaccine  08/01/2018

## 2018-08-08 NOTE — PROGRESS NOTES
Chief Complaint   Patient presents with    Follow Up Chronic Condition    Hypertension    COPD    Annual Wellness Visit     Subsequent     Chief Complaint   Patient presents with    Follow Up Chronic Condition    Hypertension    COPD    Annual Wellness Visit     Subsequent     Subjective: Yordy Mccann is a 80 y.o. RH femal        Patient Active Problem List    Diagnosis    [facility] ([facility]A) ([facility] [address]    Stroke ([facility] [address]    HTN (hypertension), benign    Diastolic dysfunction    HEART (dyspnea on exertion)    COPD (chronic obstructive pulmonary disease) ([facility] [address]    Khadar lesion, chronic     Seen on EGD 5/7/13      Iron deficiency anemia    Osteoporosis     Fosamax  2005  No fractures  In all these years      Hypercholesteremia       Denies gerd    Vitals:    08/08/18 1452   BP: 138/68   Pulse: 90   Resp: 20   Temp: 97.8 °F (36.6 °C)   TempSrc: Oral   SpO2: 95%   Weight: 135 lb 3.2 oz (61.3 kg)   Height: 4' 6.3\" (1.379 m)     S1 and S2 normal, no murmurs, clicks, gallops or rubs. Regular rate and rhythm. Chest is clear; no wheezes or rales. No edema or JVD. Neuro intact  Uses a cane for ambulation support and balance         hospitalized at [facility]/15-19/16 for episode of LOC at home resulting in a compression fracture at T7 and tongue trauma, with 2 prior episodes of unexplained LOC, the last in April and associated with tongue trauma at that time as well. Additionally, MRI of the brain with tiny acute infarct in the right deep white matter. Exam revealed upgoing toe on the left, otherwise unremarkable. EEG was normal. Episodes of LOC with tongue trauma were concerning for seizure. No known seizure risk factors other than age. Family denied concerns about dementia.      Loose bowels better with benefiber  occ gerd issue but minimal  Past Surgical History:   Procedure Laterality Date    CARDIAC SURG PROCEDURE UNLIST  2008    nuclear stress test normal     ENDOSCOPY, COLON, DIAGNOSTIC 2008, 2013    HX CATARACT REMOVAL      HX GI  2013    egd   kia ulcer    HX ORTHOPAEDIC  2010    knee replacement    IMPLANT  LOOP RECORDER  11/17/2016          Current Outpatient Prescriptions   Medication Sig Dispense Refill    varicella-zoster recombinant, PF, (SHINGRIX, PF,) 50 mcg/0.5 mL susr injection 0.5mL by IntraMUSCular route once now and then repeat in 2-6 months 0.5 mL 1    omeprazole (PRILOSEC) 20 mg capsule TAKE ONE CAPSULE BY MOUTH EVERY DAY 30 MINUTES BEFORE A MEAL FOR STOMACH ACID 90 Cap 0    alendronate (FOSAMAX) 70 mg tablet Take 1 Tab by mouth every seven (7) days. 12 Tab 1    amLODIPine (NORVASC) 5 mg tablet TAKE ONE TABLET BY MOUTH EVERY DAY 90 Tab 3    cholecalciferol, vitamin D3, (VITAMIN D3) 2,000 unit tab Take  by mouth.  PROAIR HFA 90 mcg/actuation inhaler INHALE 2 PUFFS BY MOUTH EVERY 4 HOURS AS NEEDED FOR WHEEZING OR SHORTNESS OF BREATH 1 Inhaler 5    levETIRAcetam (KEPPRA) 250 mg tablet TAKE ONE TABLET BY MOUTH 2 TIMES A  Tab 1    atorvastatin (LIPITOR) 20 mg tablet TAKE ONE TABLET BY MOUTH EVERY DAY 90 Tab 3    lisinopril (PRINIVIL, ZESTRIL) 5 mg tablet TAKE ONE TABLET BY MOUTH EVERY DAY 90 Tab 3    albuterol (PROAIR HFA) 90 mcg/actuation inhaler Take 2 Puffs by inhalation every four (4) hours as needed for Wheezing or Shortness of Breath. Ventolin brand requested 1 Inhaler 6    ferrous sulfate 325 mg (65 mg iron) tablet Take 1 Tab by mouth every fourty-eight (48) hours. Indications: Iron Deficiency Anemia 100 Tab 6    metoprolol tartrate (LOPRESSOR) 50 mg tablet TAKE ONE TABLET BY MOUTH 2 TIMES A  Tab 22    wheat dextrin (BENEFIBER SUGAR FREE, DEXTRIN,) 3 gram/3.8 gram powd Take  by mouth daily.  L. ACIDOPHILUS/STREPT/LA P-AZAEL (MICHAEL-Q PO) Take 1 Cap by mouth nightly.  acetaminophen (TYLENOL) 500 mg tablet Take 2 Tabs by mouth every six (6) hours. 100 Tab 0    aspirin 81 mg chewable tablet Take 1 Tab by mouth daily.  30 Tab 0    CALCIUM CARBONATE (OYSTER SHELL CALCIUM 500 PO) Take 1 Tab by mouth daily.  latanoprost (XALATAN) 0.005 % ophthalmic solution Administer 1 Drop to both eyes nightly.  timolol (TIMOPTIC) 0.5 % ophthalmic solution Administer 1 Drop to right eye two (2) times a day. No Known Allergies  Family History   Problem Relation Age of Onset    Heart Disease Mother     Heart Disease Father     Heart Disease Sister     Alzheimer Sister     Alcohol abuse Brother      Social History   Substance Use Topics    Smoking status: Former Smoker     Years: 30.00     Types: Cigarettes     Quit date: 9/21/2000    Smokeless tobacco: Never Used    Alcohol use No     Patient Active Problem List   Diagnosis Code    Osteoporosis M81.0    Hypercholesteremia E78.00    Iron deficiency anemia D50.9    Khadar lesion, chronic K25.7    HEART (dyspnea on exertion) R06.09    COPD (chronic obstructive pulmonary disease) (HCC) Z88.0    Diastolic dysfunction Y13.5    HTN (hypertension), benign I10    Stroke ([facility] [address] I63.9    Cerebrovascular accident (CVA) ([facility] [address] I63.9       Depression Risk Factor Screening:     PHQ over the last two weeks 8/8/2018   Little interest or pleasure in doing things Not at all   Feeling down, depressed, irritable, or hopeless Not at all   Total Score PHQ 2 0     Alcohol Risk Factor Screening:         Functional Ability and Level of Safety:     Hearing Loss   mild    Activities of Daily Living   Self-care. Requires assistance with: no ADLs    Fall Risk     Fall Risk Assessment, last 12 mths 8/8/2018   Able to walk? Yes   Fall in past 12 months? No     Abuse Screen   Patient is not abused    Review of Systems   Pertinent items are noted in HPI.     Physical Examination     Evaluation of Cognitive Function:  Mood/affect:  happy  Appearance: age appropriate  Family member/caregiver input: family helps a lot  No longer driving    Left arm tingling  This morning  For 90 minutes today    Visit Vitals    BP 138/68 (BP 1 Location: Right arm, BP Patient Position: Sitting)    Pulse 90    Temp 97.8 °F (36.6 °C) (Oral)    Resp 20    Ht 4' 6.3\" (1.379 m)    Wt 135 lb 3.2 oz (61.3 kg)    SpO2 95%    BMI 32.24 kg/m2     General appearance: alert, cooperative, no distress, appears stated age  Lungs: clear to auscultation bilaterally  Heart: regular rate and rhythm, S1, S2 normal, no murmur, click, rub or gallop  Neurologic: Grossly normal  Saw neuro and note reviewed    Lab Results   Component Value Date/Time    WBC 7.5 08/01/2018 01:28 PM    HGB 14.1 08/01/2018 01:28 PM    Hemoglobin (POC) 11.6 07/10/2013 09:09 PM    HCT 41.6 08/01/2018 01:28 PM    Hematocrit (POC) 34 (L) 07/10/2013 09:09 PM    PLATELET 729 87/79/3545 01:28 PM    MCV 91 08/01/2018 01:28 PM     Lab Results   Component Value Date/Time    Cholesterol, total 175 08/01/2018 01:28 PM    HDL Cholesterol 85 08/01/2018 01:28 PM    LDL, calculated 71 08/01/2018 01:28 PM    Triglyceride 94 08/01/2018 01:28 PM    CHOL/HDL Ratio 1.9 11/16/2016 03:53 AM     Lab Results   Component Value Date/Time    ALT (SGPT) 14 08/01/2018 01:28 PM    AST (SGOT) 21 08/01/2018 01:28 PM    Alk.  phosphatase 67 08/01/2018 01:28 PM    Bilirubin, direct 0.1 01/06/2010 09:58 AM    Bilirubin, total 0.5 08/01/2018 01:28 PM    Albumin 4.5 08/01/2018 01:28 PM    Protein, total 7.0 08/01/2018 01:28 PM    INR 1.0 11/15/2016 10:10 AM    Prothrombin time 10.3 11/15/2016 10:10 AM    PLATELET 508 16/29/6373 01:28 PM       Lab Results   Component Value Date/Time    GFR est non-AA 72 08/01/2018 01:28 PM    GFRNA, POC 39 (L) 07/10/2013 09:09 PM    GFR est AA 83 08/01/2018 01:28 PM    GFRAA, POC 48 (L) 07/10/2013 09:09 PM    Creatinine 0.76 08/01/2018 01:28 PM    Creatinine (POC) 1.3 07/10/2013 09:09 PM    BUN 13 08/01/2018 01:28 PM    BUN (POC) 23 (H) 07/10/2013 09:09 PM    Sodium 132 (L) 08/01/2018 01:28 PM    Sodium (POC) 137 07/10/2013 09:09 PM    Potassium 4.9 08/01/2018 01:28 PM    Potassium (POC) 3.8 07/10/2013 09:09 PM    Chloride 95 (L) 08/01/2018 01:28 PM    Chloride (POC) 104 07/10/2013 09:09 PM    CO2 21 08/01/2018 01:28 PM    Magnesium 1.8 11/27/2016 04:20 AM    Phosphorus 4.2 11/27/2016 04:20 AM     Lab Results   Component Value Date/Time    Iron 81 08/01/2018 01:28 PM    TIBC 335 08/01/2018 01:28 PM    Iron % saturation 24 08/01/2018 01:28 PM    Ferritin 120 07/05/2017 01:39 PM           Patient Care Team:  Gretta Bloch, MD as PCP - Nguyen Merino MD (Cardiology)  Amber Bush MD as Physician (Neurology)    Advice/Referrals/Counseling   Education and counseling provided:  Are appropriate based on today's review and evaluation  End-of-Life planning (with patient's consent)  Pneumococcal Vaccine  Influenza Vaccine      Assessment/Plan       1. HTN (hypertension), benign  controlled    2. Iron deficiency anemia due to chronic blood loss     Every other daily iron    3. Hypercholesteremia  controlled    4. Osteoporosis with current pathological fracture with routine healing, unspecified osteoporosis type, subsequent encounter  Doing ok on meds    5. Seizure ([address]  stable    6. Cerebrovascular accident (CVA), unspecified mechanism ([address]  Has recovered  7 asthma stable uses prn albuterol          Diagnoses and all orders for this visit:    1. Khadar lesion, chronic    2. HTN (hypertension), benign  -     CBC WITH AUTOMATED DIFF  -     LIPID PANEL  -     METABOLIC PANEL, COMPREHENSIVE    3. Diastolic dysfunction    4. Osteoporosis with current pathological fracture with routine healing, unspecified osteoporosis type, subsequent encounter    5. History of stroke    6. Medicare annual wellness visit, subsequent  -     varicella-zoster recombinant, PF, (SHINGRIX, PF,) 50 mcg/0.5 mL susr injection; 0.5mL by IntraMUSCular route once now and then repeat in 2-6 months    7.  Iron deficiency anemia due to chronic blood loss  -     IRON PROFILE      See 6 months

## 2018-08-08 NOTE — PROGRESS NOTES
This is the Subsequent Medicare Annual Wellness Exam, performed 12 months or more after the Initial AWV or the last Subsequent AWV    I have reviewed the patient's medical history in detail and updated the computerized patient record. 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  HEART (dyspnea on exertion) 6/12/2015    GERD (gastroesophageal reflux disease)     Hypercholesterolemia     Hypertension 9/22/2010    Ill-defined condition     Osteoporosis 9/22/2010      Past Surgical History:   Procedure Laterality Date    CARDIAC SURG PROCEDURE UNLIST  2008    nuclear stress test normal     ENDOSCOPY, COLON, DIAGNOSTIC  2008, 2013    HX CATARACT REMOVAL      HX GI  2013    egd   kia ulcer    HX ORTHOPAEDIC  2010    knee replacement    IMPLANT  LOOP RECORDER  11/17/2016          Current Outpatient Prescriptions   Medication Sig Dispense Refill    varicella-zoster recombinant, PF, (SHINGRIX, PF,) 50 mcg/0.5 mL susr injection 0.5mL by IntraMUSCular route once now and then repeat in 2-6 months 0.5 mL 1    omeprazole (PRILOSEC) 20 mg capsule TAKE ONE CAPSULE BY MOUTH EVERY DAY 30 MINUTES BEFORE A MEAL FOR STOMACH ACID 90 Cap 0    alendronate (FOSAMAX) 70 mg tablet Take 1 Tab by mouth every seven (7) days. 12 Tab 1    amLODIPine (NORVASC) 5 mg tablet TAKE ONE TABLET BY MOUTH EVERY DAY 90 Tab 3    cholecalciferol, vitamin D3, (VITAMIN D3) 2,000 unit tab Take  by mouth.       PROAIR HFA 90 mcg/actuation inhaler INHALE 2 PUFFS BY MOUTH EVERY 4 HOURS AS NEEDED FOR WHEEZING OR SHORTNESS OF BREATH 1 Inhaler 5    levETIRAcetam (KEPPRA) 250 mg tablet TAKE ONE TABLET BY MOUTH 2 TIMES A  Tab 1    atorvastatin (LIPITOR) 20 mg tablet TAKE ONE TABLET BY MOUTH EVERY DAY 90 Tab 3    lisinopril (PRINIVIL, ZESTRIL) 5 mg tablet TAKE ONE TABLET BY MOUTH EVERY DAY 90 Tab 3    albuterol (PROAIR HFA) 90 mcg/actuation inhaler Take 2 Puffs by inhalation every four (4) hours as needed for Wheezing or Shortness of Breath. Ventolin brand requested 1 Inhaler 6    ferrous sulfate 325 mg (65 mg iron) tablet Take 1 Tab by mouth every fourty-eight (48) hours. Indications: Iron Deficiency Anemia 100 Tab 6    metoprolol tartrate (LOPRESSOR) 50 mg tablet TAKE ONE TABLET BY MOUTH 2 TIMES A  Tab 22    wheat dextrin (BENEFIBER SUGAR FREE, DEXTRIN,) 3 gram/3.8 gram powd Take  by mouth daily.  L. ACIDOPHILUS/STREPT/LA P-AZAEL (MICHAEL-Q PO) Take 1 Cap by mouth nightly.  acetaminophen (TYLENOL) 500 mg tablet Take 2 Tabs by mouth every six (6) hours. 100 Tab 0    aspirin 81 mg chewable tablet Take 1 Tab by mouth daily. 30 Tab 0    CALCIUM CARBONATE (OYSTER SHELL CALCIUM 500 PO) Take 1 Tab by mouth daily.  latanoprost (XALATAN) 0.005 % ophthalmic solution Administer 1 Drop to both eyes nightly.  timolol (TIMOPTIC) 0.5 % ophthalmic solution Administer 1 Drop to right eye two (2) times a day.        No Known Allergies  Family History   Problem Relation Age of Onset    Heart Disease Mother     Heart Disease Father     Heart Disease Sister     Alzheimer Sister     Alcohol abuse Brother      Social History   Substance Use Topics    Smoking status: Former Smoker     Years: 30.00     Types: Cigarettes     Quit date: 9/21/2000    Smokeless tobacco: Never Used    Alcohol use No     Patient Active Problem List   Diagnosis Code    Osteoporosis M81.0    Hypercholesteremia E78.00    Iron deficiency anemia D50.9    Khadar lesion, chronic K25.7    HEART (dyspnea on exertion) R06.09    COPD (chronic obstructive pulmonary disease) (HCC) D18.2    Diastolic dysfunction N59.1    HTN (hypertension), benign I10    Stroke ([facility] [address] I63.9    Cerebrovascular accident (CVA) ([facility] [address] I63.9 Depression Risk Factor Screening:     PHQ over the last two weeks 8/8/2018   Little interest or pleasure in doing things Not at all   Feeling down, depressed, irritable, or hopeless Not at all   Total Score PHQ 2 0     Alcohol Risk Factor Screening:       Functional Ability and Level of Safety:   Hearing Loss  The patient wears hearing aids. Activities of Daily Living  The home contains: no safety equipment. Patient does total self care    Fall Risk  Fall Risk Assessment, last 12 mths 8/8/2018   Able to walk? Yes   Fall in past 12 months? No       Abuse Screen  Patient is not abused    Cognitive Screening   Evaluation of Cognitive Function:  Has your family/caregiver stated any concerns about your memory: no  Normal    Patient Care Team   Patient Care Team:  Neel Joseph MD as PCP - Henrietta Babb MD (Cardiology)  Eva Bonilla MD as Physician (Neurology)    Assessment/Plan   Education and counseling provided:  Are appropriate based on today's review and evaluation    Diagnoses and all orders for this visit:    1. Khadar lesion, chronic    2. HTN (hypertension), benign    3. Diastolic dysfunction    4. Osteoporosis with current pathological fracture with routine healing, unspecified osteoporosis type, subsequent encounter    5. History of stroke    6.  Medicare annual wellness visit, subsequent  -     varicella-zoster recombinant, PF, (SHINGRIX, PF,) 50 mcg/0.5 mL susr injection; 0.5mL by IntraMUSCular route once now and then repeat in 2-6 months        Health Maintenance Due   Topic Date Due    ZOSTER VACCINE AGE 60>  03/23/1993    Influenza Age 5 to Adult  08/01/2018

## 2018-08-08 NOTE — MR AVS SNAPSHOT
[address] [place] [address] [address] [facility] 
[phone] Patient: Evy Cruz MRN: [id number] Visit Information Date & Time Provider Department Dept. Phone Encounter #  
 8/8/2018  2:45 PM Gigi Ruvalcaba MD [phone] Your Appointments 8/10/2018  3:40 PM  
Follow Up with Hanane Giordano MD  
[facility] Appt Note: 6 month f/u seizure; r/s 6mth fl/up $0cp, $0pb eyt 06/01/2018 [address] [place] [address]  
[phone]  
  
   
 [address] [place] [address]  
  
    
 2/12/2019  2:45 PM  
ROUTINE CARE with Gigi Ruvalcaba MD  
Critical access hospital Internal Medicine [facility]) Appt Note: R F/U  
 [place] [address] [facility] [address]  
[address] [facility] [address] Upcoming Health Maintenance Date Due ZOSTER VACCINE AGE 60> 3/23/1993 Influenza Age 5 to Adult 8/1/2018 MEDICARE YEARLY EXAM 8/9/2019 GLAUCOMA SCREENING Q2Y 3/14/2020 DTaP/Tdap/Td series (2 - Td) 3/31/2026 Allergies as of 8/8/2018  Review Complete On: 8/8/2018 By: Navi Person LPN No Known Allergies Current Immunizations  Reviewed on 11/15/2016 Name Date Influenza High Dose Vaccine PF 10/13/2015 Influenza Vaccine 10/2/2013 Influenza Vaccine (Quad) PF 9/15/2014 Pneumococcal Conjugate (PCV-13) 11/2/2015 Pneumococcal Polysaccharide (PPSV-23) 9/23/2013 Tdap 3/31/2016  3:27 AM  
  
 Not reviewed this visit You Were Diagnosed With   
  
 Codes Comments Khadar lesion, chronic    -  Primary ICD-10-CM: K25.7 ICD-9-CM: 531.70 HTN (hypertension), benign     ICD-10-CM: I10 
ICD-9-CM: 401.1 Diastolic dysfunction     [facility]-AO: I51.9 ICD-9-CM: 429.9 Osteoporosis with current pathological fracture with routine healing, unspecified osteoporosis type, subsequent encounter     ICD-10-CM: M80.00XD ICD-9-CM: V54.29, 733.00 History of stroke     ICD-10-CM: Z86.73 
ICD-9-CM: V12.54 Medicare annual wellness visit, subsequent     ICD-10-CM: Z00.00 ICD-9-CM: V70.0 Iron deficiency anemia due to chronic blood loss     ICD-10-CM: D50.0 ICD-9-CM: 280.0 Vitals BP Pulse Temp Resp Height(growth percentile) Weight(growth percentile)  
 138/68 (BP 1 Location: Right arm, BP Patient Position: Sitting) 90 97.8 °F (36.6 °C) (Oral) 20 4' 6.3\" (1.379 m) 135 lb 3.2 oz (61.3 kg) SpO2 BMI OB Status Smoking Status 95% 32.24 kg/m2 Postmenopausal Former Smoker Vitals History BMI and BSA Data Body Mass Index Body Surface Area  
 32.24 kg/m 2 1.53 m 2 Preferred Pharmacy Pharmacy Name Phone [address] [place] [address] [phone] Your Updated Medication List  
  
   
This list is accurate as of 8/8/18  3:26 PM.  Always use your most recent med list.  
  
  
  
  
 acetaminophen 500 mg tablet Commonly known as:  TYLENOL Take 2 Tabs by mouth every six (6) hours. * albuterol 90 mcg/actuation inhaler Commonly known as:  PROAIR HFA Take 2 Puffs by inhalation every four (4) hours as needed for Wheezing or Shortness of Breath. Ventolin brand requested * PROAIR HFA 90 mcg/actuation inhaler Generic drug:  albuterol INHALE 2 PUFFS BY MOUTH EVERY 4 HOURS AS NEEDED FOR WHEEZING OR SHORTNESS OF BREATH  
  
 alendronate 70 mg tablet Commonly known as:  FOSAMAX Take 1 Tab by mouth every seven (7) days. amLODIPine 5 mg tablet Commonly known as:  Elyn Coffer TAKE ONE TABLET BY MOUTH EVERY DAY  
  
 aspirin 81 mg chewable tablet Take 1 Tab by mouth daily. atorvastatin 20 mg tablet Commonly known as:  LIPITOR  
TAKE ONE TABLET BY MOUTH EVERY DAY  
  
 BENEFIBER SUGAR FREE (DEXTRIN) 3 gram/3.8 gram Powd Generic drug:  wheat dextrin Take  by mouth daily. ferrous sulfate 325 mg (65 mg iron) tablet Take 1 Tab by mouth every fourty-eight (48) hours. Indications: Iron Deficiency Anemia MICHAEL-Q PO Take 1 Cap by mouth nightly. latanoprost 0.005 % ophthalmic solution Commonly known as:  Faheem Idalmis Administer 1 Drop to both eyes nightly. levETIRAcetam 250 mg tablet Commonly known as:  KEPPRA TAKE ONE TABLET BY MOUTH 2 TIMES A DAY  
  
 lisinopril 5 mg tablet Commonly known as:  PRINIVIL, ZESTRIL  
TAKE ONE TABLET BY MOUTH EVERY DAY  
  
 metoprolol tartrate 50 mg tablet Commonly known as:  LOPRESSOR  
TAKE ONE TABLET BY MOUTH 2 TIMES A DAY  
  
 omeprazole 20 mg capsule Commonly known as:  PRILOSEC  
TAKE ONE CAPSULE BY MOUTH EVERY DAY 30 MINUTES BEFORE A MEAL FOR STOMACH ACID OYSTER SHELL CALCIUM 500 PO Take 1 Tab by mouth daily. timolol 0.5 % ophthalmic solution Commonly known as:  TIMOPTIC Administer 1 Drop to right eye two (2) times a day. varicella-zoster recombinant (PF) 50 mcg/0.5 mL Susr injection Commonly known as:  SHINGRIX (PF)  
0.5mL by IntraMUSCular route once now and then repeat in 2-6 months VITAMIN D3 2,000 unit Tab Generic drug:  cholecalciferol (vitamin D3) Take  by mouth. * Notice: This list has 2 medication(s) that are the same as other medications prescribed for you. Read the directions carefully, and ask your doctor or other care provider to review them with you. Prescriptions Printed Refills  
 varicella-zoster recombinant, PF, (SHINGRIX, PF,) 50 mcg/0.5 mL susr injection 1 Si.5mL by IntraMUSCular route once now and then repeat in 2-6 months Class: Print We Performed the Following CBC WITH AUTOMATED DIFF [45267 CPT(R)] IRON PROFILE [id number] CPT(R)] LIPID PANEL [90163 CPT(R)] METABOLIC PANEL, COMPREHENSIVE [39441 CPT(R)] Patient Instructions Medicare Wellness Visit, Female The best way to live healthy is to have a lifestyle where you eat a well-balanced diet, exercise regularly, limit alcohol use, and quit all forms of tobacco/nicotine, if applicable. Regular preventive services are another way to keep healthy. Preventive services (vaccines, screening tests, monitoring & exams) can help personalize your care plan, which helps you manage your own care. Screening tests can find health problems at the earliest stages, when they are easiest to treat. [facility] follows the current, evidence-based guidelines published by the [facility] Jayme Schmid ([facility]) when recommending preventive services for our patients. Because we follow these guidelines, sometimes recommendations change over time as research supports it. (For example, mammograms used to be recommended annually. Even though Medicare will still pay for an annual mammogram, the newer guidelines recommend a mammogram every two years for women of average risk.) Of course, you and your provider may decide to screen more often for some diseases, based on your risk and co-morbidities (chronic disease you are already diagnosed with). Preventive services for you include: - Medicare offers their members a free annual wellness visit, which is time for you and your primary care provider to discuss and plan for your preventive service needs. Take advantage of this benefit every year! 
 
-All people over age 72 should receive the recommended pneumonia vaccines. Current USPSTF guidelines recommend a series of two vaccines for the best pneumonia protection.  
 
-All adults should have a yearly flu vaccine and a tetanus vaccine every 10 years. All adults age 61 years should receive a shingles vaccine once in their lifetime. -A bone mass density test is recommended when a woman turns 65 to screen for osteoporosis. This test is only recommended once as a screening. Some providers will use this same test as a disease monitoring tool if you already have osteoporosis. -All adults age 38-68 years who are overweight should have a diabetes screening test once every three years.  
 
-Other screening tests & preventive services for persons with diabetes include: an eye exam to screen for diabetic retinopathy, a kidney function test, a foot exam, and stricter control over your cholesterol.  
 
-Cardiovascular screening for adults with routine risk involves an electrocardiogram (ECG) at intervals determined by the provider.  
 
-Colorectal cancer screenings should be done for adults age 54-65 years with normal risk. There are a number of acceptable methods of screening for this type of cancer. Each test has its own benefits and drawbacks. Discuss with your provider what is most appropriate for you during your annual wellness visit. The different tests include: colonoscopy (considered the best screening method), a fecal occult blood test, a fecal DNA test, and sigmoidoscopy. -Breast cancer screenings are recommended every other year for women of normal risk age 54-69 years.  
 
-Cervical cancer screenings for women over age 72 are only recommended with certain risk factors.  
 
-All adults born between [facility] should be screened once for Hepatitis C. Here is a list of your current Health Maintenance items (your personalized list of preventive services) with a due date: 
Health Maintenance Due Topic Date Due  Shingles Vaccine  03/23/1993  Flu Vaccine  08/01/2018 Introducing [facility]! [place] [employer] introduces [employer] patient portal. Now you can access parts of your medical record, email your doctor's office, and request medication refills online.    
 
1. In your internet browser, go to [email or web address] 2. Click on the First Time User? Click Here link in the Sign In box. You will see the New Member Sign Up page. 3. Enter your Stickybits Access Code exactly as it appears below. You will not need to use this code after youve completed the sign-up process. If you do not sign up before the expiration date, you must request a new code. · Stickybits Access Code: [email or web address] Expires: 9/3/2018 11:28 AM 
 
4. Enter the last four digits of your Social Security Number (xxxx) and Date of Birth (mm/dd/yyyy) as indicated and click Submit. You will be taken to the next sign-up page. 5. Create a S[employer] Times[employer] NOWt ID. This will be your Stickybits login ID and cannot be changed, so think of one that is secure and easy to remember. 6. Create a Stickybits password. You can change your password at any time. 7. Enter your Password Reset Question and Answer. This can be used at a later time if you forget your password. 8. Enter your e-mail address. You will receive e-mail notification when new information is available in [address] 9. Click Sign Up. You can now view and download portions of your medical record. 10. Click the Download Summary menu link to download a portable copy of your medical information. If you have questions, please visit the Frequently Asked Questions section of the Stickybits website. Remember, Stickybits is NOT to be used for urgent needs. For medical emergencies, dial 911. Now available from your iPhone and Android! Please provide this summary of care documentation to your next provider. Your primary care clinician is listed as Lizette Dangelo. If you have any questions after today's visit, please call [phone].

## 2018-08-08 NOTE — PROGRESS NOTES
Health Maintenance Due   Topic Date Due    ZOSTER VACCINE AGE 60>  03/23/1993    Influenza Age 5 to Adult  08/01/2018       Chief Complaint   Patient presents with    Follow Up Chronic Condition    Hypertension    COPD    Annual Wellness Visit     Subsequent       1. Have you been to the ER, urgent care clinic since your last visit? Hospitalized since your last visit? No    2. Have you seen or consulted any other health care providers outside of the [address] [place] [address] since your last visit? Include any pap smears or colon screening. No    3) Do you have an Advance Directive on file? yes    4) Are you interested in receiving information on Advance Directives? NO      Patient is accompanied by self I have received verbal consent from Wilmer Johnson to discuss any/all medical information while they are present in the room. Wilmer Johnson is a 80 y.o. female and presents for annual Medicare Wellness Visit. Problem List: Reviewed with patient and discussed risk factors.     Patient Active Problem List   Diagnosis Code    Osteoporosis M81.0    Hypercholesteremia E78.00    Iron deficiency anemia D50.9    Khadar lesion, chronic K25.7    HEART (dyspnea on exertion) R06.09    COPD (chronic obstructive pulmonary disease) (HCC) Y37.7    Diastolic dysfunction L79.7    HTN (hypertension), benign I10    Stroke ([facility] [address] I63.9    Cerebrovascular accident (CVA) ([facility] [address] I63.9       Current medical providers:  Patient Care Team:  Manuel Chowdary MD as PCP - General  Hassel Collet, MD (Cardiology)  Alley Chapman MD as Physician (Neurology)    [address] [place] [address]: Reviewed with patient  Past Surgical History:   Procedure Laterality Date    CARDIAC SURG PROCEDURE UNLIST  2008    nuclear stress test normal     ENDOSCOPY, COLON, DIAGNOSTIC  2008, 2013    HX CATARACT REMOVAL      HX GI  2013    egd   khadar ulcer    HX ORTHOPAEDIC  2010    knee replacement    IMPLANT  LOOP RECORDER  11/17/2016             SH: Reviewed with patient  Social History   Substance Use Topics    Smoking status: Former Smoker     Years: 30.00     Types: Cigarettes     Quit date: 9/21/2000    Smokeless tobacco: Never Used    Alcohol use No       FH: Reviewed with patient  Family History   Problem Relation Age of Onset    Heart Disease Mother     Heart Disease Father     Heart Disease Sister     Alzheimer Sister     Alcohol abuse Brother        Medications/Allergies: Reviewed with patient  Current Outpatient Prescriptions on File Prior to Visit   Medication Sig Dispense Refill    omeprazole (PRILOSEC) 20 mg capsule TAKE ONE CAPSULE BY MOUTH EVERY DAY 30 MINUTES BEFORE A MEAL FOR STOMACH ACID 90 Cap 0    alendronate (FOSAMAX) 70 mg tablet Take 1 Tab by mouth every seven (7) days. 12 Tab 1    amLODIPine (NORVASC) 5 mg tablet TAKE ONE TABLET BY MOUTH EVERY DAY 90 Tab 3    cholecalciferol, vitamin D3, (VITAMIN D3) 2,000 unit tab Take  by mouth.  PROAIR HFA 90 mcg/actuation inhaler INHALE 2 PUFFS BY MOUTH EVERY 4 HOURS AS NEEDED FOR WHEEZING OR SHORTNESS OF BREATH 1 Inhaler 5    levETIRAcetam (KEPPRA) 250 mg tablet TAKE ONE TABLET BY MOUTH 2 TIMES A  Tab 1    atorvastatin (LIPITOR) 20 mg tablet TAKE ONE TABLET BY MOUTH EVERY DAY 90 Tab 3    lisinopril (PRINIVIL, ZESTRIL) 5 mg tablet TAKE ONE TABLET BY MOUTH EVERY DAY 90 Tab 3    albuterol (PROAIR HFA) 90 mcg/actuation inhaler Take 2 Puffs by inhalation every four (4) hours as needed for Wheezing or Shortness of Breath. Ventolin brand requested 1 Inhaler 6    ferrous sulfate 325 mg (65 mg iron) tablet Take 1 Tab by mouth every fourty-eight (48) hours. Indications: Iron Deficiency Anemia 100 Tab 6    metoprolol tartrate (LOPRESSOR) 50 mg tablet TAKE ONE TABLET BY MOUTH 2 TIMES A  Tab 22    wheat dextrin (BENEFIBER SUGAR FREE, DEXTRIN,) 3 gram/3.8 gram powd Take  by mouth daily.  L. ACIDOPHILUS/STREPT/LA P-AZAEL (MICHAEL-Q PO) Take 1 Cap by mouth nightly.       acetaminophen (TYLENOL) 500 mg tablet Take 2 Tabs by mouth every six (6) hours. 100 Tab 0    aspirin 81 mg chewable tablet Take 1 Tab by mouth daily. 30 Tab 0    CALCIUM CARBONATE (OYSTER SHELL CALCIUM 500 PO) Take 1 Tab by mouth daily.  latanoprost (XALATAN) 0.005 % ophthalmic solution Administer 1 Drop to both eyes nightly.  timolol (TIMOPTIC) 0.5 % ophthalmic solution Administer 1 Drop to right eye two (2) times a day. No current facility-administered medications on file prior to visit. No Known Allergies    Objective:  Visit Vitals    Resp 20    Ht 4' 6.3\" (1.379 m)    Wt 135 lb 3.2 oz (61.3 kg)    SpO2 98%    BMI 32.24 kg/m2    Body mass index is 32.24 kg/(m^2). Assessment of cognitive impairment: Alert and oriented x 3    Depression Screen:   PHQ over the last two weeks 8/8/2018   Little interest or pleasure in doing things Not at all   Feeling down, depressed, irritable, or hopeless Not at all   Total Score PHQ 2 0       Fall Risk Assessment:    Fall Risk Assessment, last 12 mths 8/8/2018   Able to walk? Yes   Fall in past 12 months? No       Functional Ability:   Does the patient exhibit a steady gait? yes   How long did it take the patient to get up and walk from a sitting position? 3 sec     Is the patient self reliant?  (ie can do own laundry, meals, household chores)  no     Does the patient handle his/her own medications? yes     Does the patient handle his/her own money? yes     Is the patients home safe (ie good lighting, handrails on stairs and bath, etc.)? yes     Did you notice or did patient express any hearing difficulties? YES     Did you notice or did patient express any vision difficulties? NO     Were distance and reading eye charts used? NO       Advance Care Planning:   Patient was offered the opportunity to discuss advance care planning:  yes     Does patient have an Advance Directive:  yes   If no, did you provide information on Caring Connections?   no Plan:      No orders of the defined types were placed in this encounter. Health Maintenance   Topic Date Due    ZOSTER VACCINE AGE 60>  03/23/1993    Influenza Age 5 to Adult  08/01/2018    MEDICARE YEARLY EXAM  08/09/2019    GLAUCOMA SCREENING Q2Y  03/14/2020    DTaP/Tdap/Td series (2 - Td) 03/31/2026    Pneumococcal 65+ Low/Medium Risk  Completed       *Patient verbalized understanding and agreement with the plan. A copy of the After Visit Summary with personalized health plan was given to the patient today.

## 2018-08-10 ENCOUNTER — OFFICE VISIT (OUTPATIENT)
Dept: NEUROLOGY | Age: 83
End: 2018-08-10

## 2018-08-10 VITALS
OXYGEN SATURATION: 96 % | DIASTOLIC BLOOD PRESSURE: 68 MMHG | HEIGHT: 55 IN | HEART RATE: 66 BPM | BODY MASS INDEX: 31.66 KG/M2 | WEIGHT: 136.8 LBS | RESPIRATION RATE: 18 BRPM | SYSTOLIC BLOOD PRESSURE: 102 MMHG

## 2018-08-10 DIAGNOSIS — R56.9 SEIZURES (HCC): Primary | ICD-10-CM

## 2018-08-10 NOTE — MR AVS SNAPSHOT
[facility] [address] [place] [address] 
[phone] Patient: Sharlene Hair MRN: [id number] Visit Information Date & Time Provider Department Dept. Phone Encounter #  
 8/10/2018  3:40 PM Nnamdi Martinez MD [facility] at [place] [id number] [id number] Follow-up Instructions Return in about 6 months (around 2/10/2019). Your Appointments 2019  2:45 PM  
ROUTINE CARE with Magalys Suarez MD  
[facility] 3651 [facility]) Appt Note: R F/U  
 [place] [address] 2304 [facility] 121 [facility] 7 [address] Upcoming Health Maintenance Date Due ZOSTER VACCINE AGE 60> 3/23/1993 Influenza Age 5 to Adult 2018 MEDICARE YEARLY EXAM 2019 GLAUCOMA SCREENING Q2Y 3/14/2020 DTaP/Tdap/Td series (2 - Td) 3/31/2026 Allergies as of 8/10/2018  Review Complete On: 8/10/2018 By: Nnamdi Martinez MD  
 No Known Allergies Current Immunizations  Reviewed on 11/15/2016 Name Date Influenza High Dose Vaccine PF 10/13/2015 Influenza Vaccine 10/2/2013 Influenza Vaccine (Quad) PF 9/15/2014 Pneumococcal Conjugate (PCV-13) 2015 Pneumococcal Polysaccharide (PPSV-23) 2013 Tdap 3/31/2016  3:27 AM  
  
 Not reviewed this visit You Were Diagnosed With   
  
 Codes Comments Seizures ([facility].)    -  Primary ICD-10-CM: R56.9 ICD-9-CM: 780.39 Vitals BP Pulse Resp Height(growth percentile) Weight(growth percentile) SpO2  
 102/68 66 18 4' 6.3\" (1.379 m) 136 lb 12.8 oz (62.1 kg) 96% BMI OB Status Smoking Status 32.62 kg/m2 Postmenopausal Former Smoker Vitals History BMI and BSA Data Body Mass Index Body Surface Area  
 32.62 kg/m 2 1.54 m 2 Preferred Pharmacy Pharmacy Name Phone [phone] [place] [address] [phone] Your Updated Medication List  
  
   
This list is accurate as of 8/10/18  4:06 PM.  Always use your most recent med list.  
  
  
  
  
 acetaminophen 500 mg tablet Commonly known as:  TYLENOL Take 2 Tabs by mouth every six (6) hours. * albuterol 90 mcg/actuation inhaler Commonly known as:  PROAIR HFA Take 2 Puffs by inhalation every four (4) hours as needed for Wheezing or Shortness of Breath. Ventolin brand requested * PROAIR HFA 90 mcg/actuation inhaler Generic drug:  albuterol INHALE 2 PUFFS BY MOUTH EVERY 4 HOURS AS NEEDED FOR WHEEZING OR SHORTNESS OF BREATH  
  
 alendronate 70 mg tablet Commonly known as:  FOSAMAX Take 1 Tab by mouth every seven (7) days. amLODIPine 5 mg tablet Commonly known as:  Mendez Alstrom TAKE ONE TABLET BY MOUTH EVERY DAY  
  
 aspirin 81 mg chewable tablet Take 1 Tab by mouth daily. atorvastatin 20 mg tablet Commonly known as:  LIPITOR  
TAKE ONE TABLET BY MOUTH EVERY DAY  
  
 BENEFIBER SUGAR FREE (DEXTRIN) 3 gram/3.8 gram Powd Generic drug:  wheat dextrin Take  by mouth daily. ferrous sulfate 325 mg (65 mg iron) tablet Take 1 Tab by mouth every fourty-eight (48) hours. Indications: Iron Deficiency Anemia MICHAEL-Q PO Take 1 Cap by mouth nightly. latanoprost 0.005 % ophthalmic solution Commonly known as:  Kristofer Kirt Administer 1 Drop to both eyes nightly. levETIRAcetam 250 mg tablet Commonly known as:  KEPPRA TAKE ONE TABLET BY MOUTH 2 TIMES A DAY  
  
 lisinopril 5 mg tablet Commonly known as:  PRINIVIL, ZESTRIL  
TAKE ONE TABLET BY MOUTH EVERY DAY  
  
 metoprolol tartrate 50 mg tablet Commonly known as:  LOPRESSOR  
TAKE ONE TABLET BY MOUTH 2 TIMES A DAY  
  
 [facility] Take  by mouth. omeprazole 20 mg capsule Commonly known as:  PRILOSEC  
 TAKE ONE CAPSULE BY MOUTH EVERY DAY 30 MINUTES BEFORE A MEAL FOR STOMACH ACID OYSTER SHELL CALCIUM 500 PO Take 1 Tab by mouth daily. timolol 0.5 % ophthalmic solution Commonly known as:  TIMOPTIC Administer 1 Drop to right eye two (2) times a day. varicella-zoster recombinant (PF) 50 mcg/0.5 mL Susr injection Commonly known as:  SHINGRIX (PF)  
0.5mL by IntraMUSCular route once now and then repeat in 2-6 months VITAMIN D3 2,000 unit Tab Generic drug:  cholecalciferol (vitamin D3) Take  by mouth. * Notice: This list has 2 medication(s) that are the same as other medications prescribed for you. Read the directions carefully, and ask your doctor or other care provider to review them with you. Follow-up Instructions Return in about 6 months (around 2/10/2019). Patient Instructions A Healthy Lifestyle: Care Instructions Your Care Instructions A healthy lifestyle can help you feel good, stay at a healthy weight, and have plenty of energy for both work and play. A healthy lifestyle is something you can share with your whole family. A healthy lifestyle also can lower your risk for serious health problems, such as high blood pressure, heart disease, and diabetes. You can follow a few steps listed below to improve your health and the health of your family. Follow-up care is a key part of your treatment and safety. Be sure to make and go to all appointments, and call your doctor if you are having problems. It's also a good idea to know your test results and keep a list of the medicines you take. How can you care for yourself at home? · Do not eat too much sugar, fat, or fast foods. You can still have dessert and treats now and then. The goal is moderation. · Start small to improve your eating habits. Pay attention to portion sizes, drink less juice and soda pop, and eat more fruits and vegetables. ¨ Eat a healthy amount of food. A 3-ounce serving of meat, for example, is about the size of a deck of cards. Fill the rest of your plate with vegetables and whole grains. ¨ Limit the amount of soda and sports drinks you have every day. Drink more water when you are thirsty. ¨ Eat at least 5 servings of fruits and vegetables every day. It may seem like a lot, but it is not hard to reach this goal. A serving or helping is 1 piece of fruit, 1 cup of vegetables, or 2 cups of leafy, raw vegetables. Have an apple or some carrot sticks as an afternoon snack instead of a candy bar. Try to have fruits and/or vegetables at every meal. 
· Make exercise part of your daily routine. You may want to start with simple activities, such as walking, bicycling, or slow swimming. Try to be active 30 to 60 minutes every day. You do not need to do all 30 to 60 minutes all at once. For example, you can exercise 3 times a day for 10 or 20 minutes. Moderate exercise is safe for most people, but it is always a good idea to talk to your doctor before starting an exercise program. 
· Keep moving. [place] Bun the lawn, work in the garden, or Caregivers. Take the stairs instead of the elevator at work. · If you smoke, quit. People who smoke have an increased risk for heart attack, stroke, cancer, and other lung illnesses. Quitting is hard, but there are ways to boost your chance of quitting tobacco for good. ¨ Use nicotine gum, patches, or lozenges. ¨ Ask your doctor about stop-smoking programs and medicines. ¨ Keep trying. In addition to reducing your risk of diseases in the future, you will notice some benefits soon after you stop using tobacco. If you have shortness of breath or asthma symptoms, they will likely get better within a few weeks after you quit. · Limit how much alcohol you drink. Moderate amounts of alcohol (up to 2 drinks a day for men, 1 drink a day for women) are okay.  But drinking too much can lead to liver problems, high blood pressure, and other health problems. Family health If you have a family, there are many things you can do together to improve your health. · Eat meals together as a family as often as possible. · Eat healthy foods. This includes fruits, vegetables, lean meats and dairy, and whole grains. · Include your family in your fitness plan. Most people think of activities such as jogging or tennis as the way to fitness, but there are many ways you and your family can be more active. Anything that makes you breathe hard and gets your heart pumping is exercise. Here are some tips: 
¨ Walk to do errands or to take your child to school or the bus. ¨ Go for a family bike ride after dinner instead of watching TV. Where can you learn more? Go to http://adan-matthew.info/. Enter B294 in the search box to learn more about \"A Healthy Lifestyle: Care Instructions. \" Current as of: December 7, 2017 Content Version: 11.7 © 6767-7593 [employer]. Care instructions adapted under license by [employer] (which disclaims liability or warranty for this information). If you have questions about a medical condition or this instruction, always ask your healthcare professional. [address] any warranty or liability for your use of this information. Introducing [facility] & HE[facility] SERVICES! Yury Barksdale introduces [employer] patient portal. Now you can access parts of your medical record, email your doctor's office, and request medication refills online. 1. In your internet browser, go to [email or web address] 2. Click on the First Time User? Click Here link in the Sign In box. You will see the New Member Sign Up page. 3. Enter your [employer] Access Code exactly as it appears below. You will not need to use this code after youve completed the sign-up process.  If you do not sign up before the expiration date, you must request a new code. · [employer] Access Code: [email or web address] Expires: 9/3/2018 11:28 AM 
 
4. Enter the last four digits of your Social Security Number (xxxx) and Date of Birth (mm/dd/yyyy) as indicated and click Submit. You will be taken to the next sign-up page. 5. Create a [employer] ID. This will be your [employer] login ID and cannot be changed, so think of one that is secure and easy to remember. 6. Create a [employer] password. You can change your password at any time. 7. Enter your Password Reset Question and Answer. This can be used at a later time if you forget your password. 8. Enter your e-mail address. You will receive e-mail notification when new information is available in [address] 9. Click Sign Up. You can now view and download portions of your medical record. 10. Click the Download Summary menu link to download a portable copy of your medical information. If you have questions, please visit the Frequently Asked Questions section of the [employer] website. Remember, [employer] is NOT to be used for urgent needs. For medical emergencies, dial 911. Now available from your iPhone and Android! Please provide this summary of care documentation to your next provider. Your primary care clinician is listed as Jimmy Hooper. If you have any questions after today's visit, please call [phone].

## 2018-08-10 NOTE — PATIENT INSTRUCTIONS

## 2018-08-10 NOTE — PROGRESS NOTES
Ms. Earnestine Guzman is here to follow up seizure and CVA which occurred in November 2016. Depression screening done on patient.

## 2018-08-10 NOTE — PROGRESS NOTES
Neurology Progress Note    HISTORY PROVIDED BY: patient and Son    Chief Complaint:   Chief Complaint   Patient presents with    Seizure      Subjective:   Pt is an 80 y.o. RH female last seen in clinic on 1/5/18 in f/u for probable epilepsy, hospitalized at [facility] 11/15-19/16 for episode of LOC at home resulting in a compression fracture at T7 and tongue trauma, with 2 prior episodes of unexplained LOC, the last in April, 2016 and associated with tongue trauma at that time as well. Additionally, MRI of the brain 11/15/16 with tiny acute infarct in the right deep white matter. EEG 11/15/16 was normal. Started on Keppra 250mg bid empirically for seizures without episode of LOC since. Exam was non-focal.   Continued Keppra 250mg bid. She returns for f/u. No spells of confusion or LOC since last visit. Still taking Keppra 250mg bid without side effects. Just had a PE and med review at PCP's office and ask about continuing [address]. Having treatments for macular degeneration. Walking daily. Medications are placed in pill box by family member. No new complaints, doing very well. Past Medical History:   Diagnosis Date    Anemia     Arthritis     Rohini Maclachlan lesion, chronic 5/8/2013    Seen on EGD 5/7/13    Chronic obstructive pulmonary disease ([address]     CVA (cerebral vascular accident) ([address]     Tiny punctate infarct right frontal lobe, 10/2016.     HEART (dyspnea on exertion) 6/12/2015    GERD (gastroesophageal reflux disease)     Hypercholesterolemia     Hypertension 9/22/2010    Ill-defined condition     Osteoporosis 9/22/2010      Past Surgical History:   Procedure Laterality Date    CARDIAC SURG PROCEDURE UNLIST  2008    nuclear stress test normal     ENDOSCOPY, COLON, DIAGNOSTIC  2008, 2013    HX CATARACT REMOVAL      HX GI  2013    egd   kia ulcer    HX ORTHOPAEDIC  2010    knee replacement    IMPLANT  LOOP RECORDER  11/17/2016           Social History     Social History    Marital status: SINGLE     Spouse name: N/A    Number of children: N/A    Years of education: N/A     Occupational History    Not on file. Social History Main Topics    Smoking status: Former Smoker     Years: 30.00     Types: Cigarettes     Quit date: 9/21/2000    Smokeless tobacco: Never Used    Alcohol use No    Drug use: No    Sexual activity: Not Currently     Other Topics Concern    Not on file     Social History Narrative     Family History   Problem Relation Age of Onset    Heart Disease Mother     Heart Disease Father     Heart Disease Sister     Alzheimer Sister     Alcohol abuse Brother           Objective:   ROS:  Per HPI o/w neg    No Known Allergies    Meds:  Outpatient Medications Prior to Visit   Medication Sig Dispense Refill    omeprazole (PRILOSEC) 20 mg capsule TAKE ONE CAPSULE BY MOUTH EVERY DAY 30 MINUTES BEFORE A MEAL FOR STOMACH ACID 90 Cap 0    alendronate (FOSAMAX) 70 mg tablet Take 1 Tab by mouth every seven (7) days. 12 Tab 1    amLODIPine (NORVASC) 5 mg tablet TAKE ONE TABLET BY MOUTH EVERY DAY 90 Tab 3    cholecalciferol, vitamin D3, (VITAMIN D3) 2,000 unit tab Take  by mouth.  levETIRAcetam (KEPPRA) 250 mg tablet TAKE ONE TABLET BY MOUTH 2 TIMES A  Tab 1    atorvastatin (LIPITOR) 20 mg tablet TAKE ONE TABLET BY MOUTH EVERY DAY 90 Tab 3    lisinopril (PRINIVIL, ZESTRIL) 5 mg tablet TAKE ONE TABLET BY MOUTH EVERY DAY 90 Tab 3    albuterol (PROAIR HFA) 90 mcg/actuation inhaler Take 2 Puffs by inhalation every four (4) hours as needed for Wheezing or Shortness of Breath. Ventolin brand requested 1 Inhaler 6    ferrous sulfate 325 mg (65 mg iron) tablet Take 1 Tab by mouth every fourty-eight (48) hours. Indications: Iron Deficiency Anemia 100 Tab 6    metoprolol tartrate (LOPRESSOR) 50 mg tablet TAKE ONE TABLET BY MOUTH 2 TIMES A  Tab 22    acetaminophen (TYLENOL) 500 mg tablet Take 2 Tabs by mouth every six (6) hours.  100 Tab 0    aspirin 81 mg chewable tablet Take 1 Tab by mouth daily. 30 Tab 0    CALCIUM CARBONATE (OYSTER SHELL CALCIUM 500 PO) Take 1 Tab by mouth daily.  latanoprost (XALATAN) 0.005 % ophthalmic solution Administer 1 Drop to both eyes nightly.  timolol (TIMOPTIC) 0.5 % ophthalmic solution Administer 1 Drop to right eye two (2) times a day.  varicella-zoster recombinant, PF, (SHINGRIX, PF,) 50 mcg/0.5 mL susr injection 0.5mL by IntraMUSCular route once now and then repeat in 2-6 months 0.5 mL 1    PROAIR HFA 90 mcg/actuation inhaler INHALE 2 PUFFS BY MOUTH EVERY 4 HOURS AS NEEDED FOR WHEEZING OR SHORTNESS OF BREATH 1 Inhaler 5    wheat dextrin (BENEFIBER SUGAR FREE, DEXTRIN,) 3 gram/3.8 gram powd Take  by mouth daily.  L. ACIDOPHILUS/STREPT/LA P-AZAEL (MICHAEL-Q PO) Take 1 Cap by mouth nightly. No facility-administered medications prior to visit. Imaging:  MRI Results (most recent):    Results from Hospital Encounter encounter on 06/09/17   MRI [facility] SPINE WO CONT   Narrative EXAM:  MRI [facility] SPINE WO CONT    INDICATION:  T/L-spine trauma, significant injury suspected, +/- localizing  signs. Low back pain    COMPARISON: 11/15/2016    TECHNIQUE: MR imaging of the thoracic spine was performed using the following  sequences: sagittal T1, T2, stir; axial T1, T2.     CONTRAST: None. FINDINGS:    Mild mid thoracic kyphosis is noted. There is no significant subluxation. . There  is a severe, chronic compression fracture at T7. There is an acute, severe  inferior and superior endplate compression fracture at T12. There is only  minimal bulging of the posterior inferior margin of the fracture. No evidence of  extension into the pedicles or posterior elements is seen. . Mild osteophytic  bony endplate changes are noted at multiple levels. . No concerning signal  changes are seen in the visualized paraspinal soft tissues.     The course, caliber, and signal intensity of the spinal cord are normal.     A large hiatal hernia is redemonstrated. Multiple minimal disc bulges and/or protrusions are noted. The largest of these  are a left paracentral disc protrusion at T5-T6 and a right paracentral disc  protrusion at T4-T5. Both of these partially efface the lateral recesses. Minimal bulging of the posterior inferior margin of the T12 fracture causes no  significant narrowing of the canal. There is no significant spinal canal  stenosis throughout the thoracic spine. Impression IMPRESSION:    1. Acute, severe T12 compression fracture. Minimal bulging of the posterior  inferior margin of the fracture, however this does not cause significant spinal  canal stenosis. No evidence of extension of the fracture into the pedicles or  posterior elements. 2. Chronic T7 compression fracture. 3. Mild to mild/moderate thoracic spondylosis. 23X  [id number]           CT Results (most recent):    Results from Hospital Encounter encounter on 11/15/16   CT HEAD WO CONT   Narrative EXAM:  CT HEAD WITHOUT CONTRAST    INDICATION: Found on the floor after fall. COMPARISON: 3/31/2016. CONTRAST: None. TECHNIQUE: Unenhanced CT of the head was performed using 5 mm images. Brain and  bone windows were generated. Sagittal and coronal reformations were generated. CT dose reduction was achieved through use of a standardized protocol tailored  for this examination and automatic exposure control for dose modulation. CT dose  reduction was achieved through use of a standardized protocol tailored for this  examination and automatic exposure control for dose modulation. Adaptive  statistical iterative reconstruction (ASIR) was utilized for this examination. FINDINGS:  The ventricles and sulci are normal in size, shape and configuration and  midline. There is atherosclerotic calcification of the vertebral arteries.   There is patchy decreased attenuation in the periventricular white matter which  is nonspecific but consistent with small vessel disease. There is no  intracranial hemorrhage. There is no extra-axial collection, mass, mass effect  or midline shift. The basilar cisterns are open. No acute infarct is  identified. The bone windows demonstrate no abnormalities. The visualized  portions of the paranasal sinuses and mastoid air cells are clear. Impression IMPRESSION: No hemorrhage or acute intracranial abnormality. Microvascular  disease unchanged. Reviewed records in [employer] tab today    Lab Review   Results for orders placed or performed in visit on 04/04/18   CBC WITH AUTOMATED DIFF   Result Value Ref Range    WBC 7.5 3.4 - 10.8 x10E3/uL    RBC 4.56 3.77 - 5.28 x10E6/uL    HGB 14.1 11.1 - 15.9 g/dL    HCT 41.6 34.0 - 46.6 %    MCV 91 79 - 97 fL    MCH 30.9 26.6 - 33.0 pg    MCHC 33.9 31.5 - 35.7 g/dL    RDW 14.5 12.3 - 15.4 %    PLATELET 722 885 - 429 x10E3/uL    NEUTROPHILS 69 Not Estab. %    Lymphocytes 19 Not Estab. %    MONOCYTES 8 Not Estab. %    EOSINOPHILS 3 Not Estab. %    BASOPHILS 1 Not Estab. %    ABS. NEUTROPHILS 5.1 1.4 - 7.0 x10E3/uL    Abs Lymphocytes 1.5 0.7 - 3.1 x10E3/uL    ABS. MONOCYTES 0.6 0.1 - 0.9 x10E3/uL    ABS. EOSINOPHILS 0.3 0.0 - 0.4 x10E3/uL    ABS. BASOPHILS 0.1 0.0 - 0.2 x10E3/uL    IMMATURE GRANULOCYTES 0 Not Estab. %    ABS. IMM.  GRANS. 0.0 0.0 - 0.1 x10E3/uL   LIPID PANEL   Result Value Ref Range    Cholesterol, total 175 100 - 199 mg/dL    Triglyceride 94 0 - 149 mg/dL    HDL Cholesterol 85 >39 mg/dL    VLDL, calculated 19 5 - 40 mg/dL    LDL, calculated 71 0 - 99 mg/dL   METABOLIC PANEL, COMPREHENSIVE   Result Value Ref Range    Glucose 84 65 - 99 mg/dL    BUN 13 8 - 27 mg/dL    Creatinine 0.76 0.57 - 1.00 mg/dL    GFR est non-AA 72 >59 mL/min/1.73    GFR est AA 83 >59 mL/min/1.73    BUN/Creatinine ratio 17 12 - 28    Sodium 132 (L) 134 - 144 mmol/L    Potassium 4.9 3.5 - 5.2 mmol/L    Chloride 95 (L) 96 - 106 mmol/L    CO2 21 20 - 29 mmol/L    Calcium 9.2 8.7 - 10.3 mg/dL    Protein, total 7.0 6.0 - 8.5 g/dL    Albumin 4.5 3.5 - 4.7 g/dL    GLOBULIN, TOTAL 2.5 1.5 - 4.5 g/dL    A-G Ratio 1.8 1.2 - 2.2    Bilirubin, total 0.5 0.0 - 1.2 mg/dL    Alk. phosphatase 67 39 - 117 IU/L    AST (SGOT) 21 0 - 40 IU/L    ALT (SGPT) 14 0 - 32 IU/L   IRON PROFILE   Result Value Ref Range    TIBC 335 250 - 450 ug/dL    UIBC 254 118 - 369 ug/dL    Iron 81 27 - 139 ug/dL    Iron % saturation 24 15 - 55 %   CVD REPORT   Result Value Ref Range    INTERPRETATION Note         Exam:  Visit Vitals    /68    Pulse 66    Resp 18    Ht 4' 6.3\" (1.379 m)    Wt 62.1 kg (136 lb 12.8 oz)    SpO2 96%    BMI 32.62 kg/m2     General:  Alert, cooperative, no distress. Head:  Normocephalic, without obvious abnormality, atraumatic. Respiratory:  Heart:   Non labored breathing  Regular rate and rhythm, no murmurs       Extremities: Warm, no cyanosis or edema. Pulses: 2+ radial pulses. Neurologic:  MS: Alert, speech intact. Language intact. Attention and fund of knowledge appropriate. Recent and remote memory intact.   Cranial Nerves:  II: visual fields VFF   II: pupils    II: optic disc    III,VII: ptosis none   III,IV,VI: extraocular muscles  EOMI, no nystagmus or diplopia   V: facial light touch sensation     VII: facial muscle function   symmetric   VIII: hearing intact   IX: soft palate elevation     XI: trapezius strength     XI: sternocleidomastoid strength    XII: tongue       Motor: normal bulk and tone, no tremor              Strength: 5/5 throughout, no PD  Coordination: FTN intact  Gait: normal gait  Reflexes: 2+ symmetric    Mini Mental State Exam 7/5/2017 1/5/2017   What is the Year 1 1   What is the Season 1 1   What is the Date 1 1   What is the Day 1 1   What is the Month 1 1   Where are we State 1 1   Where are we Country 1 1   Where are we [address] [place] [address] or [facility] 1 1   Whree are we Floor 1 1   Name three objects, then ask the patient to say them 3 3   Serial sevens Subtract 7 from 100 in increments 5 5   Ask for the three objects repeated above 3 3   Name a pencil 1 1   Name a watch 1 1   Have the patient repeat this phrase \"No ifs, ands, or buts\" 1 1   Three stage command: Take the paper in your right hand 1 1   Fold the paper in half 1 1   Put the paper on the floor 1 1   Read and obey the following: CLOSE YOUR EYES 1 1   Have the patient write a sentence 1 1   Have the patient copy a figure 1 1   Mini Mental Score 30 30          Assessment/Plan    Pt is an 80 y.o. RH female with probable epilepsy, hospitalized at [facility] 11/15-19/16 for episode of LOC at home resulting in a compression fracture at T7 and tongue trauma, with 2 prior episodes of unexplained LOC, the last in April, 2016 and associated with tongue trauma at that time as well. Additionally, MRI of the brain 11/15/16 with tiny acute infarct in the right deep white matter. EEG 11/15/16 was normal. Started on Keppra 250mg bid empirically for seizures without episode of LOC since and no side effects to med. Exam is non-focal.   Discussed risks and benefits of continuing Keppra 250mg bid, I recommend she continue this medication indefinitely, they were in agreement. Follow-up in clinic in 6 months, instructed to call in the interim with any questions or concerns. ICD-10-CM ICD-9-CM    1. Seizures ([facility] R56.9 780.39        Signed:   Nadir Gill MD  8/10/2018

## 2018-09-10 RX ORDER — LEVETIRACETAM 250 MG/1
TABLET ORAL
Qty: 180 TAB | Refills: 3 | Status: SHIPPED | OUTPATIENT
Start: 2018-09-10 | End: 2019-09-03 | Stop reason: SDUPTHER

## 2018-09-10 NOTE — TELEPHONE ENCOUNTER
Received refill request from [address] [place] [address] for Levetiracetam. Please send in if appropriate.  Thanks

## 2018-09-12 ENCOUNTER — TELEPHONE (OUTPATIENT)
Dept: NEUROLOGY | Age: 83
End: 2018-09-12

## 2018-09-12 NOTE — TELEPHONE ENCOUNTER
Stephanie calling stating that the refill prescription has not be put in yet. Wanting to know an update. Please call back when refilled.

## 2018-12-05 ENCOUNTER — OFFICE VISIT (OUTPATIENT)
Dept: CARDIOLOGY CLINIC | Age: 83
End: 2018-12-05

## 2018-12-05 DIAGNOSIS — Z95.818 STATUS POST PLACEMENT OF IMPLANTABLE LOOP RECORDER: Primary | ICD-10-CM

## 2018-12-07 ENCOUNTER — APPOINTMENT (OUTPATIENT)
Dept: CT IMAGING | Age: 83
End: 2018-12-07
Attending: EMERGENCY MEDICINE
Payer: MEDICARE

## 2018-12-07 ENCOUNTER — HOSPITAL ENCOUNTER (EMERGENCY)
Age: 83
Discharge: HOME | End: 2018-12-07
Attending: EMERGENCY MEDICINE
Payer: MEDICARE

## 2018-12-07 ENCOUNTER — APPOINTMENT (OUTPATIENT)
Dept: GENERAL RADIOLOGY | Age: 83
End: 2018-12-07
Attending: EMERGENCY MEDICINE
Payer: MEDICARE

## 2018-12-07 VITALS
TEMPERATURE: 97.8 F | OXYGEN SATURATION: 97 % | BODY MASS INDEX: 25.2 KG/M2 | DIASTOLIC BLOOD PRESSURE: 64 MMHG | WEIGHT: 125 LBS | SYSTOLIC BLOOD PRESSURE: 133 MMHG | HEIGHT: 59 IN | RESPIRATION RATE: 16 BRPM | HEART RATE: 66 BPM

## 2018-12-07 DIAGNOSIS — R55 SYNCOPE AND COLLAPSE: Primary | ICD-10-CM

## 2018-12-07 LAB
ALBUMIN SERPL-MCNC: 3.6 G/DL (ref 3.5–5)
ALBUMIN/GLOB SERPL: 1 {RATIO} (ref 1.1–2.2)
ALP SERPL-CCNC: 75 U/L (ref 45–117)
ALT SERPL-CCNC: 25 U/L (ref 12–78)
ANION GAP SERPL CALC-SCNC: 9 MMOL/L (ref 5–15)
APPEARANCE UR: CLEAR
AST SERPL-CCNC: 31 U/L (ref 15–37)
ATRIAL RATE: 56 BPM
BACTERIA URNS QL MICRO: NEGATIVE /HPF
BASOPHILS # BLD: 0.1 K/UL (ref 0–0.1)
BASOPHILS NFR BLD: 1 % (ref 0–1)
BILIRUB SERPL-MCNC: 0.7 MG/DL (ref 0.2–1)
BILIRUB UR QL: NEGATIVE
BUN SERPL-MCNC: 13 MG/DL (ref 6–20)
BUN/CREAT SERPL: 16 (ref 12–20)
CALCIUM SERPL-MCNC: 9 MG/DL (ref 8.5–10.1)
CALCULATED P AXIS, ECG09: 63 DEGREES
CALCULATED R AXIS, ECG10: 1 DEGREES
CALCULATED T AXIS, ECG11: 18 DEGREES
CHLORIDE SERPL-SCNC: 98 MMOL/L (ref 97–108)
CK SERPL-CCNC: 120 U/L (ref 26–192)
CO2 SERPL-SCNC: 26 MMOL/L (ref 21–32)
COLOR UR: ABNORMAL
COMMENT, HOLDF: NORMAL
CREAT SERPL-MCNC: 0.83 MG/DL (ref 0.55–1.02)
DIAGNOSIS, 93000: NORMAL
DIFFERENTIAL METHOD BLD: ABNORMAL
EOSINOPHIL # BLD: 0.1 K/UL (ref 0–0.4)
EOSINOPHIL NFR BLD: 1 % (ref 0–7)
EPITH CASTS URNS QL MICRO: ABNORMAL /LPF
ERYTHROCYTE [DISTWIDTH] IN BLOOD BY AUTOMATED COUNT: 13.2 % (ref 11.5–14.5)
GLOBULIN SER CALC-MCNC: 3.6 G/DL (ref 2–4)
GLUCOSE SERPL-MCNC: 106 MG/DL (ref 65–100)
GLUCOSE UR STRIP.AUTO-MCNC: NEGATIVE MG/DL
HCT VFR BLD AUTO: 42.5 % (ref 35–47)
HGB BLD-MCNC: 14.4 G/DL (ref 11.5–16)
HGB UR QL STRIP: NEGATIVE
HYALINE CASTS URNS QL MICRO: ABNORMAL /LPF (ref 0–5)
IMM GRANULOCYTES # BLD: 0.1 K/UL (ref 0–0.04)
IMM GRANULOCYTES NFR BLD AUTO: 1 % (ref 0–0.5)
KETONES UR QL STRIP.AUTO: NEGATIVE MG/DL
LEUKOCYTE ESTERASE UR QL STRIP.AUTO: NEGATIVE
LYMPHOCYTES # BLD: 1 K/UL (ref 0.8–3.5)
LYMPHOCYTES NFR BLD: 8 % (ref 12–49)
MCH RBC QN AUTO: 31.2 PG (ref 26–34)
MCHC RBC AUTO-ENTMCNC: 33.9 G/DL (ref 30–36.5)
MCV RBC AUTO: 92 FL (ref 80–99)
MONOCYTES # BLD: 0.7 K/UL (ref 0–1)
MONOCYTES NFR BLD: 5 % (ref 5–13)
NEUTS SEG # BLD: 10.6 K/UL (ref 1.8–8)
NEUTS SEG NFR BLD: 85 % (ref 32–75)
NITRITE UR QL STRIP.AUTO: NEGATIVE
NRBC # BLD: 0 K/UL (ref 0–0.01)
NRBC BLD-RTO: 0 PER 100 WBC
P-R INTERVAL, ECG05: 178 MS
PH UR STRIP: 6.5 [PH] (ref 5–8)
PLATELET # BLD AUTO: 326 K/UL (ref 150–400)
PMV BLD AUTO: 9.8 FL (ref 8.9–12.9)
POTASSIUM SERPL-SCNC: 4.7 MMOL/L (ref 3.5–5.1)
PROT SERPL-MCNC: 7.2 G/DL (ref 6.4–8.2)
PROT UR STRIP-MCNC: ABNORMAL MG/DL
Q-T INTERVAL, ECG07: 436 MS
QRS DURATION, ECG06: 60 MS
QTC CALCULATION (BEZET), ECG08: 420 MS
RBC # BLD AUTO: 4.62 M/UL (ref 3.8–5.2)
RBC #/AREA URNS HPF: ABNORMAL /HPF (ref 0–5)
SAMPLES BEING HELD,HOLD: NORMAL
SODIUM SERPL-SCNC: 133 MMOL/L (ref 136–145)
SP GR UR REFRACTOMETRY: 1.01 (ref 1–1.03)
TROPONIN I SERPL-MCNC: 0.07 NG/ML
TROPONIN I SERPL-MCNC: 0.08 NG/ML
UR CULT HOLD, URHOLD: NORMAL
UROBILINOGEN UR QL STRIP.AUTO: 0.2 EU/DL (ref 0.2–1)
VENTRICULAR RATE, ECG03: 56 BPM
WBC # BLD AUTO: 12.5 K/UL (ref 3.6–11)
WBC URNS QL MICRO: ABNORMAL /HPF (ref 0–4)

## 2018-12-07 PROCEDURE — 84484 ASSAY OF TROPONIN QUANT: CPT

## 2018-12-07 PROCEDURE — 51701 INSERT BLADDER CATHETER: CPT

## 2018-12-07 PROCEDURE — 77030011943

## 2018-12-07 PROCEDURE — 93005 ELECTROCARDIOGRAM TRACING: CPT

## 2018-12-07 PROCEDURE — 73521 X-RAY EXAM HIPS BI 2 VIEWS: CPT

## 2018-12-07 PROCEDURE — 36415 COLL VENOUS BLD VENIPUNCTURE: CPT

## 2018-12-07 PROCEDURE — 70450 CT HEAD/BRAIN W/O DYE: CPT

## 2018-12-07 PROCEDURE — 82550 ASSAY OF CK (CPK): CPT

## 2018-12-07 PROCEDURE — 99285 EMERGENCY DEPT VISIT HI MDM: CPT

## 2018-12-07 PROCEDURE — 85025 COMPLETE CBC W/AUTO DIFF WBC: CPT

## 2018-12-07 PROCEDURE — 71045 X-RAY EXAM CHEST 1 VIEW: CPT

## 2018-12-07 PROCEDURE — 81001 URINALYSIS AUTO W/SCOPE: CPT

## 2018-12-07 PROCEDURE — 80053 COMPREHEN METABOLIC PANEL: CPT

## 2018-12-07 RX ORDER — SODIUM CHLORIDE 0.9 % (FLUSH) 0.9 %
5-10 SYRINGE (ML) INJECTION EVERY 8 HOURS
Status: DISCONTINUED | OUTPATIENT
Start: 2018-12-07 | End: 2018-12-07 | Stop reason: HOSPADM

## 2018-12-07 RX ORDER — SODIUM CHLORIDE 0.9 % (FLUSH) 0.9 %
5-10 SYRINGE (ML) INJECTION AS NEEDED
Status: DISCONTINUED | OUTPATIENT
Start: 2018-12-07 | End: 2018-12-07 | Stop reason: HOSPADM

## 2018-12-07 RX ADMIN — Medication 10 ML: at 15:44

## 2018-12-07 NOTE — ED NOTES
Patient discharged by provider. Family ( daughter) to transport home. Transported to discharge in wheelchair.

## 2018-12-07 NOTE — ED TRIAGE NOTES
Patient arrived via EMS. Woke up on the floor in her senior [facility]. States she pain bilaterally in her hips and has a headache. Family Practice physician interviewing patient. EKG 12 lead performed.

## 2018-12-07 NOTE — ED PROVIDER NOTES
79 yo with known diastolic dysfunction, seizure disorder, history of CVA , hypertension, s/p pacemaker who presented for evaluation of syncope. She found herself this morning on the floor. She does not remember what happened prior to this episode. She was experiencing generalized body aches associated with generalized body numbness and bilateral hip pain. She was unable to ambulate after the fall. She called her son and EMS was called who brought her to ER. No chest pain, no SOB. She reports she did not have any seizure for the last 2 years, currently takes Keppra, compliant with medication. Past Medical History:  
Diagnosis Date  Anemia  Arthritis [place] lesion, chronic 5/8/2013 Seen on EGD 5/7/13  Chronic obstructive pulmonary disease ([address]  CVA (cerebral vascular accident) ([address] Tiny punctate infarct right frontal lobe, 10/2016.  HEART (dyspnea on exertion) 6/12/2015  GERD (gastroesophageal reflux disease)  Hypercholesterolemia  Hypertension 9/22/2010  Ill-defined condition  Osteoporosis 9/22/2010  Seizures ([address] Past Surgical History:  
Procedure Laterality Date  CARDIAC SURG PROCEDURE UNLIST  2008  
 nuclear stress test normal   
 ENDOSCOPY, COLON, DIAGNOSTIC  2008, 2013  HX CATARACT REMOVAL    
 HX GI  2013  
 egd   kia ulcer  HX ORTHOPAEDIC  2010  
 knee replacement  IMPLANT  LOOP RECORDER  11/17/2016 Family History:  
Problem Relation Age of Onset  Heart Disease Mother  Heart Disease Father  Heart Disease Sister  Alzheimer Sister  Alcohol abuse Brother Social History Socioeconomic History  Marital status: SINGLE Spouse name: Not on file  Number of children: Not on file  Years of education: Not on file  Highest education level: Not on file Social Needs  Financial resource strain: Not on file  Food insecurity - worry: Not on file  Food insecurity - inability: Not on file  Transportation needs - medical: Not on file  Transportation needs - non-medical: Not on file Occupational History  Not on file Tobacco Use  Smoking status: Former Smoker Years: 30.00 Types: Cigarettes Last attempt to quit: 2000 Years since quittin.2  Smokeless tobacco: Never Used Substance and Sexual Activity  Alcohol use: Yes Alcohol/week: 3.6 oz Types: 6 Glasses of wine per week  Drug use: No  
 Sexual activity: Not Currently Other Topics Concern  Not on file Social History Narrative  Not on file ALLERGIES: Patient has no known allergies. Review of Systems Constitutional: Positive for activity change. Negative for fatigue and fever. HENT: Positive for hearing loss. Negative for congestion and nosebleeds. Using hearing aids Eyes: Negative for photophobia, itching and visual disturbance. Respiratory: Negative for cough, chest tightness, shortness of breath and wheezing. Cardiovascular: Negative for chest pain, palpitations and leg swelling. Gastrointestinal: Negative for abdominal distention, abdominal pain and vomiting. Genitourinary: Negative for difficulty urinating, flank pain and urgency. Musculoskeletal: Positive for arthralgias. Negative for joint swelling. Neurological: Positive for syncope, weakness and numbness. Negative for dizziness, facial asymmetry and headaches. Vitals:  
 18 1409 18 1414 18 1430 18 1639 BP: 141/66 131/63 132/65 133/64 Pulse: (!) 58 62 (!) 59 66 Resp:   23 16 Temp:    97.8 °F (36.6 °C) SpO2:   96% 97% Weight:      
Height:      
      
 
Physical Exam  
Constitutional: She is oriented to person, place, and time. She appears well-developed and well-nourished. No distress. HENT:  
Head: Normocephalic and atraumatic.   
Eyes: Conjunctivae are normal. Pupils are equal, round, and reactive to light. Right eye exhibits no discharge. Neck: Normal range of motion. Cardiovascular: Regular rhythm and intact distal pulses. No murmur heard. +Bradycardia Pulmonary/Chest: Breath sounds normal. No respiratory distress. She has no wheezes. She exhibits no tenderness. Abdominal: Soft. Bowel sounds are normal. She exhibits no distension. There is no tenderness. There is no guarding. Musculoskeletal: She exhibits no edema or tenderness. Neurological: She is alert and oriented to person, place, and time. She displays normal reflexes. No cranial nerve deficit or sensory deficit. She exhibits normal muscle tone. Skin: Skin is warm and dry. She is not diaphoretic. MDM Number of Diagnoses or Management Options Diagnosis management comments: 81 yo female who presented for evaluation of syncope, s/p GLF. DDX for syncope include cardiac etiology, seizure vs CVA. EKG w/o acute changes. Will get the labs. CT head for evaluation of intracranial abnormality. Will get XR pelvis and hips b/l given GLF for evaluation of possible fractures. Amount and/or Complexity of Data Reviewed Clinical lab tests: ordered Tests in the radiology section of CPT®: ordered Risk of Complications, Morbidity, and/or Mortality Presenting problems: moderate Diagnostic procedures: moderate Management options: moderate Procedures The patient was discussed with Dr. Mary Ceballos ( the ER attending physician) AddendumSera Smyth to evaluate the patient. The patient reports feeling OK, just feels weak because she feels hungry. Orthostatic VS are negative. Troponin is slightly elevated to 0.08. Will order another troponin and order diet one time. 2:39 PM 
12/7/2018 Addendum: 
The patient was evaluated. She reports feeling good. Denies chest pain or SOB, pain in the joints and back. Repeated troponin is 0.07. Will discharge patient home with close PCP, cardiologist and neurologist follow up. Discussed the plan with the patient and the family, they agreed with the plan.  
 
4:27 PM 
12/7/2018

## 2018-12-09 NOTE — PROGRESS NOTES
In ER with syncope Minimal troponin elevation Has pacer ER and hospitalist requested appt to follow up and check pacer

## 2018-12-10 ENCOUNTER — PATIENT OUTREACH (OUTPATIENT)
Dept: INTERNAL MEDICINE CLINIC | Age: 83
End: 2018-12-10

## 2018-12-10 ENCOUNTER — TELEPHONE (OUTPATIENT)
Dept: CARDIOLOGY CLINIC | Age: 83
End: 2018-12-10

## 2018-12-10 NOTE — TELEPHONE ENCOUNTER
Need ED f/u per Dr Felicia Forbes. Notified patient and scheduled ED f/u for 12/27/18 with Dr Felicia Forbes and device clinic.

## 2018-12-10 NOTE — PROGRESS NOTES
ED Discharge Follow-Up Date/Time:     12/10/2018 12:14 PM 
 
Patient presented to [place] [address]  ED on  and was diagnosed with syncope and ground level fall. Top Challenges reviewed with the provider Hyponatremia: sodium 133, pt does not report drinking excess fluids Bradycardia: HR 52-60s in the ED, appt scheduled with Dr Adam Mejía  to check pacemaker Method of communication with provider :face to face, will discuss at appointment Nurse Navigator(NN) contacted the  patient  by telephone to perform post ED discharge assessment. Verified name and  with patient as identifiers. Provided introduction to self, and explanation of the Nurse Navigator role. Patient reported assessment: denies any dizziness/lightheadedness, prior to her syncopal episode on  she recalls drinking coffee & did not eat breakfast, does not remember falling, she is now using her rollator at all times per daughter Stephanie's instructions who is a physical therapist & she has difficulty getting around, pt reports she is eating well and drinking plenty of water, juice, & gingerale, she lives in [place] Medication(s):  
New Medications at Discharge: none Changed Medications at Discharge: none Discontinued Medications at Discharge: none There were no barriers to obtaining medications identified at this time. Reviewed discharge instructions and red flags with  patient who voiced understanding. Patient given an opportunity to ask questions and does not have any further questions or concerns at this time. The patient agrees to contact the PCP office for questions related to their healthcare. Patient reminded that there are physicians on call 24 hours a day / 7 days a week (M-F 5pm to 8am and from Friday 5pm until Monday 8am for the weekend) should the patient have questions or concerns. NN provided contact information for future reference. Offered follow up appointment with PCP: yes [facility] follow up appointment(s):  
Future Appointments Date Time Provider Ian Chey 12/13/2018 11:30 AM Jai Erazo MD [place]  
12/27/2018  2:15 PM Angeline PATIÑO  
12/27/2018  2:20 PM Loly Art  [address]  
2/8/2019 10:20 AM Pratibha Gomez MD [address]   
2/12/2019  2:45 PM Jai Salas MD [place] [facility] follow up appointment(s): [facility]14 [facility]  offered to schedule a visit, but felt it would be more beneficial for pt to see PCP  
 [email or web address] [place] 9 AM- 9 PM.   Phone [phone] Goals None

## 2018-12-11 ENCOUNTER — DOCUMENTATION ONLY (OUTPATIENT)
Dept: CARDIOLOGY CLINIC | Age: 83
End: 2018-12-11

## 2018-12-11 ENCOUNTER — TELEPHONE (OUTPATIENT)
Dept: CARDIOLOGY CLINIC | Age: 83
End: 2018-12-11

## 2018-12-11 RX ORDER — CHLORHEXIDINE GLUCONATE 4 G/100ML
SOLUTION TOPICAL
Qty: 1 BOTTLE | Refills: 0 | Status: SHIPPED | OUTPATIENT
Start: 2018-12-11 | End: 2018-12-17

## 2018-12-11 NOTE — TELEPHONE ENCOUNTER
Verified patient with two types of identifiers. Spoke with patient's son, verified on HIPAA. Son states he spoke with his mom and she agreed to proceed with dual chamber PPM on 12/17/18 at 1:00 pm. Reviewed pre-procedure instructions with patient son. Patient recently had blood work so will not need blood work repeated. Notified son that patient does not need to hold any scheduled medications prior to procedure. Notified son that a prescription for Hibiclens will be sent into patient's pharmacy and explained how to use cleaning solution. Verified pharmacy. Patient's son verbalized understanding and will call with any other questions.

## 2018-12-11 NOTE — TELEPHONE ENCOUNTER
Verified patient with two types of identifiers. Dr Janes Tyler discussed PPM with son Giselle Ortiz. Explained procedure. Date offered of 12/17/18 at 1:00pm.  He will discuss with his mother and call us back to let us know if we can proceed.

## 2018-12-11 NOTE — PROGRESS NOTES
ILR showed 6 second sinus pause and then 20 bpm sinus bradycardia 12/7/2018  + bpm    ILR 11/2016    She recently was in hospital ER with syncope    I recommend dual chamber pacer to her and her son as soon as possible    Future Appointments   Date Time Provider Ian Guerrier   12/13/2018 11:30 AM MD Espinoza Segal   12/27/2018  2:15  [facility]   12/27/2018  2:20 PM Jewel Doan  [address]   2/8/2019 10:20 AM Frandy Small MD [address]   2/12/2019  2:45 PM Jae Boyd MD [facility]

## 2018-12-12 ENCOUNTER — PATIENT OUTREACH (OUTPATIENT)
Dept: INTERNAL MEDICINE CLINIC | Age: 83
End: 2018-12-12

## 2018-12-12 NOTE — PROGRESS NOTES
Per Dr. Isaak Vaz notes, patient will have a new PPM placed on Monday 12/17 due to sinus pause. Discussed with Dr. Cherie Schaefer & he does not need to see pt for ED follow up. Contacted the patient and her son Bolivar Escobar and notified him that this NN will cancel appointment. They verbalized understanding.

## 2018-12-14 RX ORDER — CEFAZOLIN SODIUM/WATER 2 G/20 ML
2 SYRINGE (ML) INTRAVENOUS ONCE
Status: CANCELLED | OUTPATIENT
Start: 2018-12-14 | End: 2018-12-14

## 2018-12-14 RX ORDER — SODIUM CHLORIDE 0.9 % (FLUSH) 0.9 %
5-10 SYRINGE (ML) INJECTION AS NEEDED
Status: CANCELLED | OUTPATIENT
Start: 2018-12-14

## 2018-12-14 RX ORDER — SODIUM CHLORIDE 0.9 % (FLUSH) 0.9 %
5-10 SYRINGE (ML) INJECTION EVERY 8 HOURS
Status: CANCELLED | OUTPATIENT
Start: 2018-12-14

## 2018-12-17 ENCOUNTER — HOSPITAL ENCOUNTER (OUTPATIENT)
Dept: NON INVASIVE DIAGNOSTICS | Age: 83
Discharge: HOME | End: 2018-12-17
Attending: INTERNAL MEDICINE | Admitting: INTERNAL MEDICINE
Payer: MEDICARE

## 2018-12-17 ENCOUNTER — APPOINTMENT (OUTPATIENT)
Dept: GENERAL RADIOLOGY | Age: 83
End: 2018-12-17
Attending: NURSE PRACTITIONER
Payer: MEDICARE

## 2018-12-17 VITALS
WEIGHT: 126 LBS | DIASTOLIC BLOOD PRESSURE: 74 MMHG | RESPIRATION RATE: 20 BRPM | HEART RATE: 70 BPM | SYSTOLIC BLOOD PRESSURE: 149 MMHG | OXYGEN SATURATION: 99 % | BODY MASS INDEX: 26.45 KG/M2 | TEMPERATURE: 98.1 F | HEIGHT: 58 IN

## 2018-12-17 DIAGNOSIS — Z95.0 S/P PLACEMENT OF CARDIAC PACEMAKER: Primary | ICD-10-CM

## 2018-12-17 PROBLEM — I49.5 SICK SINUS SYNDROME DUE TO SA NODE DYSFUNCTION (HCC): Status: ACTIVE | Noted: 2018-12-17

## 2018-12-17 PROBLEM — I48.0 PAF (PAROXYSMAL ATRIAL FIBRILLATION) (HCC): Status: ACTIVE | Noted: 2018-12-17

## 2018-12-17 PROCEDURE — 77030018673

## 2018-12-17 PROCEDURE — 74011250636 HC RX REV CODE- 250/636: Performed by: INTERNAL MEDICINE

## 2018-12-17 PROCEDURE — 33208 INSRT HEART PM ATRIAL & VENT: CPT

## 2018-12-17 PROCEDURE — 74011000272 HC RX REV CODE- 272: Performed by: INTERNAL MEDICINE

## 2018-12-17 PROCEDURE — 77030018547 HC SUT ETHBND1 J&J -B

## 2018-12-17 PROCEDURE — C1898 LEAD, PMKR, OTHER THAN TRANS: HCPCS

## 2018-12-17 PROCEDURE — 77030031139 HC SUT VCRL2 J&J -A

## 2018-12-17 PROCEDURE — 74011000250 HC RX REV CODE- 250: Performed by: INTERNAL MEDICINE

## 2018-12-17 PROCEDURE — C1893 INTRO/SHEATH, FIXED,NON-PEEL: HCPCS

## 2018-12-17 PROCEDURE — A4565 SLINGS: HCPCS

## 2018-12-17 PROCEDURE — 71045 X-RAY EXAM CHEST 1 VIEW: CPT

## 2018-12-17 PROCEDURE — 74011636320 HC RX REV CODE- 636/320: Performed by: INTERNAL MEDICINE

## 2018-12-17 PROCEDURE — C1785 PMKR, DUAL, RATE-RESP: HCPCS

## 2018-12-17 PROCEDURE — 77030039046 HC PAD DEFIB RADIOTRNSPNT CNMD -B

## 2018-12-17 RX ORDER — SODIUM CHLORIDE 0.9 % (FLUSH) 0.9 %
5-10 SYRINGE (ML) INJECTION AS NEEDED
Status: DISCONTINUED | OUTPATIENT
Start: 2018-12-17 | End: 2018-12-17

## 2018-12-17 RX ORDER — ACETAMINOPHEN 325 MG/1
650 TABLET ORAL
Status: DISCONTINUED | OUTPATIENT
Start: 2018-12-17 | End: 2018-12-17 | Stop reason: HOSPADM

## 2018-12-17 RX ORDER — CEFAZOLIN SODIUM/WATER 2 G/20 ML
2 SYRINGE (ML) INTRAVENOUS ONCE
Status: COMPLETED | OUTPATIENT
Start: 2018-12-17 | End: 2018-12-17

## 2018-12-17 RX ORDER — FENTANYL CITRATE 50 UG/ML
25-200 INJECTION, SOLUTION INTRAMUSCULAR; INTRAVENOUS
Status: DISCONTINUED | OUTPATIENT
Start: 2018-12-17 | End: 2018-12-17

## 2018-12-17 RX ORDER — LIDOCAINE HYDROCHLORIDE 10 MG/ML
10-40 INJECTION INFILTRATION; PERINEURAL AS NEEDED
Status: DISCONTINUED | OUTPATIENT
Start: 2018-12-17 | End: 2018-12-17

## 2018-12-17 RX ORDER — CEPHALEXIN 250 MG/1
250 CAPSULE ORAL 3 TIMES DAILY
Qty: 15 CAP | Refills: 0 | Status: SHIPPED | OUTPATIENT
Start: 2018-12-17 | End: 2018-12-22

## 2018-12-17 RX ORDER — HYDROCODONE BITARTRATE AND ACETAMINOPHEN 5; 325 MG/1; MG/1
1 TABLET ORAL
Status: DISCONTINUED | OUTPATIENT
Start: 2018-12-17 | End: 2018-12-17 | Stop reason: HOSPADM

## 2018-12-17 RX ORDER — VANCOMYCIN HYDROCHLORIDE 1 G/20ML
1000 INJECTION, POWDER, LYOPHILIZED, FOR SOLUTION INTRAVENOUS ONCE
Status: DISCONTINUED | OUTPATIENT
Start: 2018-12-17 | End: 2018-12-17 | Stop reason: HOSPADM

## 2018-12-17 RX ORDER — SODIUM CHLORIDE 0.9 % (FLUSH) 0.9 %
5-10 SYRINGE (ML) INJECTION EVERY 8 HOURS
Status: DISCONTINUED | OUTPATIENT
Start: 2018-12-17 | End: 2018-12-17 | Stop reason: HOSPADM

## 2018-12-17 RX ORDER — ONDANSETRON 2 MG/ML
4 INJECTION INTRAMUSCULAR; INTRAVENOUS
Status: DISCONTINUED | OUTPATIENT
Start: 2018-12-17 | End: 2018-12-17 | Stop reason: HOSPADM

## 2018-12-17 RX ORDER — ATROPINE SULFATE 0.1 MG/ML
1 INJECTION INTRAVENOUS AS NEEDED
Status: DISCONTINUED | OUTPATIENT
Start: 2018-12-17 | End: 2018-12-17

## 2018-12-17 RX ORDER — MIDAZOLAM HYDROCHLORIDE 1 MG/ML
.5-1 INJECTION, SOLUTION INTRAMUSCULAR; INTRAVENOUS
Status: DISCONTINUED | OUTPATIENT
Start: 2018-12-17 | End: 2018-12-17

## 2018-12-17 RX ADMIN — MIDAZOLAM 1 MG: 1 INJECTION INTRAMUSCULAR; INTRAVENOUS at 14:37

## 2018-12-17 RX ADMIN — SODIUM CHLORIDE 50000 UNITS: 900 IRRIGANT IRRIGATION at 14:53

## 2018-12-17 RX ADMIN — LIDOCAINE HYDROCHLORIDE 30 ML: 10 INJECTION, SOLUTION INFILTRATION; PERINEURAL at 14:40

## 2018-12-17 RX ADMIN — MIDAZOLAM 2 MG: 1 INJECTION INTRAMUSCULAR; INTRAVENOUS at 14:03

## 2018-12-17 RX ADMIN — FENTANYL CITRATE 50 MCG: 50 INJECTION INTRAMUSCULAR; INTRAVENOUS at 14:40

## 2018-12-17 RX ADMIN — FENTANYL CITRATE 50 MCG: 50 INJECTION INTRAMUSCULAR; INTRAVENOUS at 14:03

## 2018-12-17 RX ADMIN — FENTANYL CITRATE 25 MCG: 50 INJECTION INTRAMUSCULAR; INTRAVENOUS at 14:26

## 2018-12-17 RX ADMIN — MIDAZOLAM 2 MG: 1 INJECTION INTRAMUSCULAR; INTRAVENOUS at 14:26

## 2018-12-17 RX ADMIN — FENTANYL CITRATE 25 MCG: 50 INJECTION INTRAMUSCULAR; INTRAVENOUS at 14:38

## 2018-12-17 RX ADMIN — IOPAMIDOL 10 ML: 755 INJECTION, SOLUTION INTRAVENOUS at 14:49

## 2018-12-17 RX ADMIN — Medication 2 G: at 13:45

## 2018-12-17 RX ADMIN — MIDAZOLAM 2 MG: 1 INJECTION INTRAMUSCULAR; INTRAVENOUS at 14:40

## 2018-12-17 NOTE — DISCHARGE INSTRUCTIONS
PATIENT INSTRUCTIONS POST-PACEMAKER IMPLANT    1. No heavy lifting or exercises with the left arm for 4 weeks. This includes the following:  Do not raise arm above the shoulder level to comb hair, pull on clothes, etc... Do not use the affected arm to pull up or push up from a seated or laying  down position. Do not allow anyone else to pull on the affected arm. 2.  Dressing should remain in place for approx 1 week; it is typically removed in clinic at follow up. Keep site clean & dry. No showers until after follow up. 3.  Do not drive for 3 days    4. Call Dr. Michael Turner at [phone] if you experience any of the following symptoms:  1. Redness at the pacemaker site  2. Swelling at or around the pacemaker or in the left arm  3. Pain around the pacemaker  4. Dizziness, lightheadedness, fainting spells  5. Lack of energy  6. Shortness of breath  7. Rapid heart rate  8. Chest or muscle twitches    5. Follow-up with Dr. Michael Turner as scheduled. Future Appointments   Date Time Provider Ian Guerrier   12/27/2018  2:15  [facility], [address] [facility]   12/27/2018  2:20 PM Deborah Gallo  [address]   2/8/2019 10:20 AM MD ERIKA [address]   2/12/2019  2:45 PM Alber Urban MD [facility]     6. You may use pain medication and ice pack for pain relief as needed. You may wear the sling as a reminder to keep your arm below the your shoulder. 7. A prescription for antibiotics was sent electronically to your pharmacy on record. Please pick it up & take as directed. Rich Hancock M.D.  [facility]  Electrophysiology/Cardiology  [address] [facility] and Vascular [place]  [address], [place] [address], [place] Bhard[place]aj [address] [place] [address]  [phone]

## 2018-12-17 NOTE — PROGRESS NOTES
Transfer to [address] [place] [address] from Procedure Area    Verbal report given to [facility] being transferred to Cardiac Cath Lab  for routine post - op   Patient is post Pacemaker implant procedure. Patient stable upon transfer to . Report consisted of patients Situation, Background, Assessment and   Recommendations(SBAR). Information from the following report(s) Procedure Summary, Intake/Output, MAR and Cardiac Rhythm NSR was reviewed with the receiving nurse. Opportunity for questions and clarification was provided. Patient medicated during procedure with orders obtained and verified by Dr. Michelle Edwards. Refer to patient PROCEDURE REPORT for vital signs, assessment, status, and response during procedure.

## 2018-12-17 NOTE — PROGRESS NOTES
Pt and Pt's son verbalized understanding of discharge instructions. Left arm sling intact and left chest chest dressing dry,clean and intact. Ice pack to left chest surgical site. Pt with no complaints of discomfort. Pt escorted to car via wheel chair with belongings.

## 2018-12-17 NOTE — PROCEDURES
DATE OF PROCEDURE: 12/17/2018    PROCEDURE:    1. Implantation of dual-chamber pacemaker with contrast venography and fluoroscopy. 2. Removal of the implantable loop recorder    HISTORY:  This is a 80 y. o.woman with syncope correlated with 6 second sinus pause on ILR and she had had PAF with RVR. She  meets the indication for dual chamber pacer insertion. The risks and benefits were discussed with the patient and her son and she agreed to proceed. PROCEDURE IN DETAIL:  After the informed written consent had been obtained, the patient was brought to the Electrophysiology Suite where the patient was prepped and draped in the usual sterile fashion. Conscious sedation was initiated and maintained with intravenous Versed and fentanyl. Local anesthesia with 2% Xylocaine was given in the left infraclavicular area. 10 mL of contrast injected inside the left antecubital vein and the left axillary and left subclavian and cephalic veins were patent. The #10 blade scalpel was then used to make a 3 cm incision below the left clavicle. Using the modified Seldinger technique and wire retention 1 guidewire was advanced into the left axillary vein. A subcutaneous device pocket was made in the inferomedial direction by blunt dissection. Over the guidewire, a 9-Kiswahili peel-away introducer dilator was then advanced inside the vein. An active fixation pacing and sensing lead was positioned in the right ventricular basal septum. The lead was anchored down with #2 Ethibond sutures after the pacing and sensing were optimal.  Over the retained guidewire another 7-Kiswahili peel-away introducer dilator was then advanced inside the vein. The active fixation pacing and sensing lead was then advanced and positioned in the right atrial appendage    There was no diaphragmatic stimulation with 10 volts maximal output for either lead.       The pocket is now irrigated with antibiotic solution and the lead was connected to the device and inserted inside the pocket. The pocket is now closed in three consecutive layers using 2-0 Vicryl sutures in continuous fashion. Steri strip is now applied over the surgical wound. COMPLICATIONS: none    Lidocaine was given over the site of the previous implantable loop recorder and # 10 blade was used to make 1 inch incision and it was taken to the capsule and the device was removed from body  The incision was closed with 2-0 vicryl    ESTIMATED BLOOD LOSS: 10 mL. Specimen removed: Medtronic LNQ 11, SN# [id number], DOI 11/17/2016    IMPLANTED HARDWARE:      The pacemaker is a Medtronic SAMEER Tadeo DR, model # A2DR [id number], serial # [id number]    ATRIAL LEAD: medtronic [id number], serial # [id number]    VENTRICULAR LEAD:  Medtronic model # [id number] and serial # [id number]    DATA:  The P wave is 3.2 mV, pacing impedance 755 ohms and pacing threshold acutely is 1.9 volts at 0.5 ms. The ventricular lead had the R wave sensing 9.4 mV and the pacing impedance 1036 ohms and pacing threshold 0.7 volts at 0.5 ms. PROGRAMMED PARAMETER:  The device is programmed to MVPR pacing mode with the low rate of 60 beats per minute and upper tracking sensor rate of 130 beats per minute. ASSESSMENT AND PLAN:  The patient will follow up with me in 1-2 weeks    Rashida Das M.D.  [facility]  Electrophysiology/Cardiology  [address] [place] [address] and Vascular [place]  [address], [address] [place] [address]  [phone]

## 2018-12-17 NOTE — H&P
Cardiac Electrophysiology H&P Note     Subjective: Franny Caldwell is a 80 y.o. patient who presents today for planned dual chamber pacemaker implant for sick sinus syndrome, also removal of existing ILR. She presented to the ED on 12/07/2018 for syncope. Minimal troponin elevation. ILR that day showed 6 second sinus pause, then 20 bpm sinus bradycardia, also  bpm.    Currently taking ASA 81 mg po daily for anticoagulation. [facility] has been discussed previously for embolic CVA prophylaxis, but this was deferred at that time after discussion with son due to fall risk & rarity of atrial arrhythmia. No bleeding problems on ASA. She denies any significant changes in medical history or hospitalizations since ED visit on 12/07/2018. Previous:  ILR 11/2016. Multiple previous syncopal episodes (2008, 2010, 2016, 2017). AFL x 30 seconds on 02/23/2017. Echo (11/15/2016): LVEF 60-65%, no RWMA, mild concentric LVH. LA mildly dilated. Mild TR, mild pulmonary hypertension. Mild SD. Uses cane for ambulation.     Problem List  Date Reviewed: 12/7/2018          Codes Class Noted    Cerebrovascular accident (CVA) [facility]) ICD-10-CM: I63.9  ICD-9-CM: 434.91  8/14/2017        Stroke ([facility] ICD-10-CM: I63.9  ICD-9-CM: 434.91  11/16/2016        HTN (hypertension), benign ICD-10-CM: I10  ICD-9-CM: 401.1  0/63/5300        Diastolic dysfunction ROBBIE-78-[place]: I51.9  ICD-9-CM: 429.9  4/1/2016        HEART (dyspnea on exertion) ICD-10-CM: R06.09  ICD-9-CM: 786.09  6/12/2015        COPD (chronic obstructive pulmonary disease) ([facility] ICD-10-CM: J44.9  ICD-9-CM: 496  6/12/2015        Khadar lesion, chronic ICD-10-CM: K25.7  ICD-9-CM: 531.70  5/8/2013    Overview Signed 5/8/2013  5:52 PM by Barbara Cruz MD     Seen on EGD 5/7/13             Iron deficiency anemia ICD-10-CM: D50.9  ICD-9-CM: 280.9  9/18/2012        Osteoporosis ICD-10-CM: M81.0  ICD-9-CM: 733.00  9/22/2010    Overview Addendum 9/21/2011 10:28 AM by Moira Coon MD     Fosamax  2005  No fractures  In all these years             Hypercholesteremia ICD-10-CM: E78.00  ICD-9-CM: 272.0  2010              Current Facility-Administered Medications   Medication Dose Route Frequency Provider Last Rate Last Dose    ceFAZolin (ANCEF) 2 g/20 mL in sterile water IV syringe  2 g IntraVENous Kinjal Meraz MD        bacitracin 50,000 Units in sodium chloride irrigation 0.9 % 500 mL irrigation solution  50,000 Units Irrigation Kinjal Meraz MD         No Known Allergies  Past Medical History:   Diagnosis Date    Anemia     Arthritis     Khadar lesion, chronic 2013    Seen on EGD 13    Chronic obstructive pulmonary disease ([address]     CVA (cerebral vascular accident) ([address]     Tiny punctate infarct right frontal lobe, 10/2016.  HEART (dyspnea on exertion) 2015    GERD (gastroesophageal reflux disease)     Hypercholesterolemia     Hypertension 2010    Ill-defined condition     Osteoporosis 2010    Seizures ([address]      Past Surgical History:   Procedure Laterality Date    CARDIAC SURG PROCEDURE UNLIST  2008    nuclear stress test normal     ENDOSCOPY, COLON, DIAGNOSTIC  ,     HX CATARACT REMOVAL      HX GI  2013    egd   khadar ulcer    HX ORTHOPAEDIC  2010    knee replacement    IMPLANT  LOOP RECORDER  2016          Family History   Problem Relation Age of Onset    Heart Disease Mother     Heart Disease Father     Heart Disease Sister     Alzheimer Sister     Alcohol abuse Brother      Social History     Tobacco Use    Smoking status: Former Smoker     Years: 30.00     Types: Cigarettes     Last attempt to quit: 2000     Years since quittin.2    Smokeless tobacco: Never Used   Substance Use Topics    Alcohol use: Yes     Alcohol/week: 3.6 oz     Types: 6 Glasses of wine per week        Review of Systems:   Constitutional: Negative for fever, chills, weight loss, malaise/fatigue. HEENT: Negative for nosebleeds, vision changes. Respiratory: Negative for cough, hemoptysis  Cardiovascular: Negative for chest pain, palpitations, orthopnea, claudication, leg swelling, + recent syncope, and no PND. Gastrointestinal: Negative for nausea, vomiting, diarrhea, blood in stool and melena. Genitourinary: Negative for dysuria, and hematuria. Musculoskeletal: Negative for myalgias, arthralgia. Skin: Negative for rash. Heme: Does not bleed or bruise easily. Neurological: Negative for speech change and focal weakness     Objective:     Visit Vitals  Ht 4' 10\" (1.473 m)   Wt 126 lb (57.2 kg)   Breastfeeding? No   BMI 26.33 kg/m²      Physical Exam:   Constitutional: well-developed and well-nourished. No respiratory distress. Head: Normocephalic and atraumatic. Eyes: Pupils are equal, round  ENT: hearing normal  Neck: supple. No JVD present. Cardiovascular: Normal rate, regular rhythm. Exam reveals no gallop and no friction rub. No murmur heard. Pulmonary/Chest: Effort normal and breath sounds normal. No wheezes. Abdominal: Soft, no tenderness. Musculoskeletal: no edema. Neurological: alert,oriented. Skin: Skin is warm and dry. Left side ILR well healed. Psychiatric: normal mood and affect. Behavior is normal. Judgment and thought content normal.        Assessment/Plan:   Ms. Mavis Shipman has history of multiple syncopal episodes. Most recent incident earlier this month correlated with 6 second sinus pause followed by sinus bradycardia with rate 20 BPM.     PAF & AFL have been noted on monitor, though rarely. Currently taking ASA 81 mg po daily. [address] has previously been discussed, but deferred due to fall risk. With additional  bpm recently noted & correlating cause for recent syncope now treated with pacemaker, would potentially reconsider [address] for embolic CVA prophylaxis after wound heals.     Risks/benefits of dual chamber pacemaker implant & ILR removal reviewed with patient, & she is willing to proceed. REGINA Merchant [address] [facility] [place]  12/17/18    Addendum from EP attending:   I have seen, examined patient, and discussed with nurse practitioner, registered nurse, reviewed, updated note and agree with the assessment and plan    I have talked to her this pm  She had one syncope this month and bit her tongue  Vital signs are stable now  Exam shows regular rhythm      Assessment and Plan:  Continue to proceed with dual chamber pacer implant and ILR removal  She agrees to proceed    Thank you for involving me in this patient's care and please call with further concerns or questions. Christos Madera M.D.   Electrophysiology/Cardiology  [facility] [place]  [address], [facility] [address] [facility], [address]  [phone]

## 2018-12-17 NOTE — PROGRESS NOTES
Ice pack placed to left chest surgical site and left arm sling placed. Pt verbalized understanding of need not to raise arm above shoulder as with hair combing.

## 2018-12-17 NOTE — PROGRESS NOTES
Cardiac Cath Lab Recovery Arrival Note:      Elizabeth Knox arrived to Cardiac Cath Lab, Recovery Area. Staff introduced to patient. Patient identifiers verified with NAME and DATE OF BIRTH. Procedure verified with patient. Consent forms reviewed and signed by patient or authorized representative and verified. Allergies verified. Patient and family oriented to department. Patient and family informed of procedure and plan of care. Questions answered with review. Patient prepped for procedure, per orders from physician, prior to arrival.    Patient on cardiac monitor, non-invasive blood pressure, SPO2 monitor. On Room AIr. Patient is A&Ox 4. Patient reports No Pain. Patient in stretcher, in low position, with side rails up, call bell within reach, patient instructed to call if assistance as needed. Patient prep in: [address], [place] [address]. Family in: Waiting Room.    Prep by: Reyes Mistry RN

## 2018-12-17 NOTE — PROGRESS NOTES
TRANSFER - IN REPORT:    Verbal report received from [facility] on Anai Cuevas  being received from procedure area for routine progression of care. Report consisted of patients Situation, Background, Assessment and Recommendations(SBAR). Information from the following report(s) Procedure Summary was reviewed with the receiving clinician. Opportunity for questions and clarification was provided. Assessment completed upon patients arrival to [address] [place] [address] and care assumed. Cardiac Cath Lab Recovery Arrival Note:    Anai Cuevas arrived to [facility] recovery area. Patient procedure= PPI/Loop recorder removal. Patient on cardiac monitor, non-invasive blood pressure, SPO2 monitor. On O2 @ 2 lpm via NC.  IV  of NS on pump at 25 ml/hr. Patient status doing well without problems. Patient is A&Ox 4. Patient reports No Pain. PROCEDURE SITE CHECK:    Procedure site:without any bleeding and No Hematoma, No pain/discomfort reported at procedure site. No change in patient status. Continue to monitor patient and status.

## 2018-12-17 NOTE — PROGRESS NOTES
Cardiac Cath Lab Procedure Area Arrival Note:    Sia Maurer arrived to Cardiac Cath Lab, Procedure Area. Patient identifiers verified with NAME and DATE OF BIRTH. Procedure verified with patient. Consent forms verified. Allergies verified. Patient informed of procedure and plan of care. Questions answered with review. Patient voiced understanding of procedure and plan of care. Patient on cardiac monitor, non-invasive blood pressure, SPO2 monitor. On room air. Placed on   O2 @ 2 lpm via nasal cannula. IV of NS on pump at 25 ml/hr. Patient status doing well without problems. Patient is A&Ox 4. Patient reports no pain. Patient medicated during procedure with orders obtained and verified by Dr. Juan Desai. Refer to patients Cardiac Cath Lab PROCEDURE REPORT for vital signs, assessment, status, and response during procedure, printed at end of case. Printed report on chart or scanned into chart.

## 2018-12-27 ENCOUNTER — OFFICE VISIT (OUTPATIENT)
Dept: CARDIOLOGY CLINIC | Age: 83
End: 2018-12-27

## 2018-12-27 ENCOUNTER — CLINICAL SUPPORT (OUTPATIENT)
Dept: CARDIOLOGY CLINIC | Age: 83
End: 2018-12-27

## 2018-12-27 VITALS — HEART RATE: 77 BPM | HEIGHT: 58 IN | WEIGHT: 127 LBS | BODY MASS INDEX: 26.66 KG/M2

## 2018-12-27 DIAGNOSIS — I49.5 SICK SINUS SYNDROME (HCC): ICD-10-CM

## 2018-12-27 DIAGNOSIS — Z95.0 CARDIAC PACEMAKER IN SITU: Primary | ICD-10-CM

## 2018-12-27 DIAGNOSIS — I48.92 ATRIAL FLUTTER, PAROXYSMAL (HCC): ICD-10-CM

## 2018-12-27 DIAGNOSIS — I47.29 NSVT (NONSUSTAINED VENTRICULAR TACHYCARDIA): ICD-10-CM

## 2018-12-27 DIAGNOSIS — I47.1 PAT (PAROXYSMAL ATRIAL TACHYCARDIA) (HCC): ICD-10-CM

## 2018-12-27 NOTE — PROGRESS NOTES
Wound Check   Patient is here for wound check. No fever, drainage   Redness from tapes and dressing  Physical Exam   Constitutional: well-developed and well-nourished. Skin: Left side pocket is healed without drainage, + small hematoma       ASSESSMENT and PLAN     ICD-10-CM ICD-9-CM    1. Cardiac pacemaker in situ Z95.0 V45.01    2. Atrial flutter, paroxysmal ([facility]) I48.92 427.32    3. PAT (paroxysmal atrial tachycardia) ([facility]) I47.1 427.0    4. NSVT (nonsustained ventricular tachycardia) ([facility]) I47.2 427.1    5.  Sick sinus syndrome ([facility]) I49.5 427.81      PAT 10 sec and NSVT 1 second  35% atrial pacing  current treatment plan is effective, no change in therapy   Device check shows proper lead and generator functions  Follow-up Disposition:  Return 3 months I will check via device clinic or remote monitoring in the future  I discussed with her son Anna Riley

## 2019-01-07 ENCOUNTER — PATIENT OUTREACH (OUTPATIENT)
Dept: INTERNAL MEDICINE CLINIC | Age: 84
End: 2019-01-07

## 2019-01-07 NOTE — PROGRESS NOTES
Patient has graduated from the Transitions of Care Coordination  program on 1/6/19. She had a new pacemaker placed on 12/17/18 & saw Dr. Louisa Guerra for f/u on 12/27/18. He advised f/u in 3 months. Patient's symptoms are stable at this time. Patient/family has the ability to self-manage. Care management goals have been completed at this time. No further nurse navigator follow up scheduled. Goals Addressed None Pt has nurse navigator's contact information for any further questions, concerns, or needs. Patients upcoming visits:   
Future Appointments Date Time Provider Ian Guerrier 2/8/2019 10:20 AM Ayaka Mckeon MD [address]  
2/20/2019  2:00 PM MD MARJ Long LETY SCHED  
3/21/2019  9:45 AM Grecia Coronado LETY SCHED  
3/21/2019 10:00 AM Ada Mcadams  [address]

## 2019-01-29 ENCOUNTER — OFFICE VISIT (OUTPATIENT)
Dept: NEUROLOGY | Age: 84
End: 2019-01-29

## 2019-01-29 VITALS
DIASTOLIC BLOOD PRESSURE: 68 MMHG | HEIGHT: 58 IN | OXYGEN SATURATION: 98 % | HEART RATE: 68 BPM | RESPIRATION RATE: 18 BRPM | WEIGHT: 126.6 LBS | SYSTOLIC BLOOD PRESSURE: 118 MMHG | BODY MASS INDEX: 26.57 KG/M2

## 2019-01-29 DIAGNOSIS — R56.9 SEIZURES (HCC): Primary | ICD-10-CM

## 2019-01-29 NOTE — PROGRESS NOTES
Ms. Nikki Schrader presents today to follow up CVA and seizure. She has not had any seizures in the past two years. Her son, Linette Olivera, is present at appointment. Depression screening done on this patient.

## 2019-01-29 NOTE — PATIENT INSTRUCTIONS
A Healthy Lifestyle: Care Instructions Your Care Instructions A healthy lifestyle can help you feel good, stay at a healthy weight, and have plenty of energy for both work and play. A healthy lifestyle is something you can share with your whole family. A healthy lifestyle also can lower your risk for serious health problems, such as high blood pressure, heart disease, and diabetes. You can follow a few steps listed below to improve your health and the health of your family. Follow-up care is a key part of your treatment and safety. Be sure to make and go to all appointments, and call your doctor if you are having problems. It's also a good idea to know your test results and keep a list of the medicines you take. How can you care for yourself at home? · Do not eat too much sugar, fat, or fast foods. You can still have dessert and treats now and then. The goal is moderation. · Start small to improve your eating habits. Pay attention to portion sizes, drink less juice and soda pop, and eat more fruits and vegetables. ? Eat a healthy amount of food. A 3-ounce serving of meat, for example, is about the size of a deck of cards. Fill the rest of your plate with vegetables and whole grains. ? Limit the amount of soda and sports drinks you have every day. Drink more water when you are thirsty. ? Eat at least 5 servings of fruits and vegetables every day. It may seem like a lot, but it is not hard to reach this goal. A serving or helping is 1 piece of fruit, 1 cup of vegetables, or 2 cups of leafy, raw vegetables. Have an apple or some carrot sticks as an afternoon snack instead of a candy bar. Try to have fruits and/or vegetables at every meal. 
· Make exercise part of your daily routine. You may want to start with simple activities, such as walking, bicycling, or slow swimming. Try to be active 30 to 60 minutes every day.  You do not need to do all 30 to 60 minutes all at once. For example, you can exercise 3 times a day for 10 or 20 minutes. Moderate exercise is safe for most people, but it is always a good idea to talk to your doctor before starting an exercise program. 
· Keep moving. Richard Cue the lawn, work in the garden, or [place]. Take the stairs instead of the elevator at work. · If you smoke, quit. People who smoke have an increased risk for heart attack, stroke, cancer, and other lung illnesses. Quitting is hard, but there are ways to boost your chance of quitting tobacco for good. ? Use nicotine gum, patches, or lozenges. ? Ask your doctor about stop-smoking programs and medicines. ? Keep trying. In addition to reducing your risk of diseases in the future, you will notice some benefits soon after you stop using tobacco. If you have shortness of breath or asthma symptoms, they will likely get better within a few weeks after you quit. · Limit how much alcohol you drink. Moderate amounts of alcohol (up to 2 drinks a day for men, 1 drink a day for women) are okay. But drinking too much can lead to liver problems, high blood pressure, and other health problems. Family health If you have a family, there are many things you can do together to improve your health. · Eat meals together as a family as often as possible. · Eat healthy foods. This includes fruits, vegetables, lean meats and dairy, and whole grains. · Include your family in your fitness plan. Most people think of activities such as jogging or tennis as the way to fitness, but there are many ways you and your family can be more active. Anything that makes you breathe hard and gets your heart pumping is exercise. Here are some tips: 
? Walk to do errands or to take your child to school or the bus. 
? Go for a family bike ride after dinner instead of watching TV. Where can you learn more? Go to http://adan-matthew.info/. Enter C648 in the search box to learn more about \"A Healthy Lifestyle: Care Instructions. \" Current as of: September 11, 2018 Content Version: 11.9 © 0881-3425 [employer]. Care instructions adapted under license by [employer] (which disclaims liability or warranty for this information). If you have questions about a medical condition or this instruction, always ask your healthcare professional. [place] [address] any warranty or liability for your use of this information.

## 2019-01-29 NOTE — PROGRESS NOTES
Neurology Progress Note HISTORY PROVIDED BY: patient and Son Chief Complaint:  
Chief Complaint Patient presents with  Stroke  Seizure Subjective:  
Pt is an 80 y.o. RH female last seen in clinic on 8/10/18 in f/u for probable epilepsy, hospitalized at [facility] 11/15-19/16 for episode of LOC at home resulting in a compression fracture at T7 and tongue trauma, with 2 prior episodes of unexplained LOC, the last in April, 2016 and associated with tongue trauma at that time as well. Additionally, MRI of the brain 11/15/16 with tiny acute infarct in the right deep white matter. EEG 11/15/16 was normal. Started on Keppra 250mg bid empirically for seizures without episode of LOC since and no side effects to med. Exam was non-focal.   Discussed risks and benefits of continuing Keppra 250mg bid, I recommended she continue this medication indefinitely, they were in agreement. She returns for f/u. She has had a PM implanted 12/17/18 for sick sinus syndrome, had an episode of syncope associated with six second pause and then 20 bpm on ILR, also noted to have PAF with HR 160mg. No OAC started, had been discussed in past for stroke prevention, but not started due to fall risk and infrequent Afib. No seizures since last visit. She is doing well without new neurological complaints. She is using a cane or walker when ambulating. She is feeling blue, doesn't feel motivated. She read that the PM should make her feel more energized, but she isn't. She is living alone in a community with neighbors, but they do not get together, and her family visits a few times a week. She asked about dementia, has \"a dazed look\" sometimes when she looks in the mirror. Her sister had dementia. Her family is not concerned about dementia in pt. Mentions her son's father-in-law passed away last week, had a \"meltdown\" yesterday crying. She is eating, but cannot eat as much as she did.  Her son reports that she eats well when they are with her. Past Medical History:  
Diagnosis Date  Anemia  Arthritis Mickye Mould lesion, chronic 2013 Seen on EGD 13  Chronic obstructive pulmonary disease ([address]  CVA (cerebral vascular accident) ([facility] Tiny punctate infarct right frontal lobe, 10/2016.  HEART (dyspnea on exertion) 2015  GERD (gastroesophageal reflux disease)  Hypercholesterolemia  Hypertension 2010  Ill-defined condition  Osteoporosis 2010  Seizures ([address]  Sick sinus syndrome due to SA node dysfunction (HCC) 2018 Past Surgical History:  
Procedure Laterality Date  CARDIAC SURG PROCEDURE UNLIST    
 nuclear stress test normal   
 ENDOSCOPY, COLON, DIAGNOSTIC  ,   HX CATARACT REMOVAL    
 HX GI    
 egd   kia ulcer  HX ORTHOPAEDIC    
 knee replacement  IMPLANT  LOOP RECORDER  2016  AL INS NEW/RPLCMT PRM PM W/TRANSV ELTRD ATRIAL&VENT  2018  AL RMVL IMPLANTABLE PT-ACTIVATED CAR EVENT RECORDER  2018 Social History Socioeconomic History  Marital status: SINGLE Spouse name: Not on file  Number of children: Not on file  Years of education: Not on file  Highest education level: Not on file Social Needs  Financial resource strain: Not on file  Food insecurity - worry: Not on file  Food insecurity - inability: Not on file  Transportation needs - medical: Not on file  Transportation needs - non-medical: Not on file Occupational History  Not on file Tobacco Use  Smoking status: Former Smoker Years: 30.00 Types: Cigarettes Last attempt to quit: 2000 Years since quittin.3  Smokeless tobacco: Never Used Substance and Sexual Activity  Alcohol use: Yes Alcohol/week: 3.6 oz Types: 6 Glasses of wine per week  Drug use: No  
 Sexual activity: Not Currently Other Topics Concern  Not on file Social History Narrative  Not on file Family History Problem Relation Age of Onset  Heart Disease Mother  Heart Disease Father  Heart Disease Sister  Alzheimer Sister  Alcohol abuse Brother Objective:  
ROS: 
Per HPI o/w neg No Known Allergies Meds: Outpatient Medications Prior to Visit Medication Sig Dispense Refill  omeprazole (PRILOSEC) 20 mg capsule TAKE ONE CAPSULE BY MOUTH EVERY DAY 30 MINUTES BEFORE A MEAL FOR STOMACH ACID 90 Cap 3  
 levETIRAcetam (KEPPRA) 250 mg tablet TAKE ONE TABLET BY MOUTH 2 TIMES A  Tab 3  
 metoprolol tartrate (LOPRESSOR) 50 mg tablet TAKE ONE TABLET BY MOUTH 2 TIMES A  Tab 3  
 vit C/E/zinc/lutein/zeaxanthin (OCUVITE EYE HEALTH PO) Take  by mouth two (2) times a day.  alendronate (FOSAMAX) 70 mg tablet Take 1 Tab by mouth every seven (7) days. 12 Tab 1  
 amLODIPine (NORVASC) 5 mg tablet TAKE ONE TABLET BY MOUTH EVERY DAY 90 Tab 3  cholecalciferol, vitamin D3, (VITAMIN D3) 2,000 unit tab Take  by mouth.  atorvastatin (LIPITOR) 20 mg tablet TAKE ONE TABLET BY MOUTH EVERY DAY 90 Tab 3  
 lisinopril (PRINIVIL, ZESTRIL) 5 mg tablet TAKE ONE TABLET BY MOUTH EVERY DAY 90 Tab 3  
 albuterol (PROAIR HFA) 90 mcg/actuation inhaler Take 2 Puffs by inhalation every four (4) hours as needed for Wheezing or Shortness of Breath. Ventolin brand requested 1 Inhaler 6  ferrous sulfate 325 mg (65 mg iron) tablet Take 1 Tab by mouth every fourty-eight (48) hours. Indications: Iron Deficiency Anemia 100 Tab 6  wheat dextrin (BENEFIBER SUGAR FREE, DEXTRIN,) 3 gram/3.8 gram powd Take  by mouth daily.  acetaminophen (TYLENOL) 500 mg tablet Take 2 Tabs by mouth every six (6) hours. 100 Tab 0  
 aspirin 81 mg chewable tablet Take 1 Tab by mouth daily. 30 Tab 0  
 CALCIUM CARBONATE (OYSTER SHELL CALCIUM 500 PO) Take 1 Tab by mouth daily.  latanoprost (XALATAN) 0.005 % ophthalmic solution Administer 1 Drop to both eyes nightly.  timolol (TIMOPTIC) 0.5 % ophthalmic solution Administer 1 Drop to right eye two (2) times a day. No facility-administered medications prior to visit. Imaging: MRI Results (most recent): 
Results from Hospital Encounter encounter on 06/09/17 MRI [facility] SPINE WO CONT Narrative EXAM:  MRI [facility] SPINE WO CONT INDICATION:  T/L-spine trauma, significant injury suspected, +/- localizing 
signs. Low back pain COMPARISON: 11/15/2016 TECHNIQUE: MR imaging of the thoracic spine was performed using the following 
sequences: sagittal T1, T2, stir; axial T1, T2.  
 
CONTRAST: None. FINDINGS: 
 
Mild mid thoracic kyphosis is noted. There is no significant subluxation. . There 
is a severe, chronic compression fracture at T7. There is an acute, severe 
inferior and superior endplate compression fracture at T12. There is only 
minimal bulging of the posterior inferior margin of the fracture. No evidence of 
extension into the pedicles or posterior elements is seen. . Mild osteophytic 
bony endplate changes are noted at multiple levels. . No concerning signal 
changes are seen in the visualized paraspinal soft tissues. The course, caliber, and signal intensity of the spinal cord are normal.  
 
A large hiatal hernia is redemonstrated. Multiple minimal disc bulges and/or protrusions are noted. The largest of these 
are a left paracentral disc protrusion at T5-T6 and a right paracentral disc 
protrusion at T4-T5. Both of these partially efface the lateral recesses. Minimal bulging of the posterior inferior margin of the T12 fracture causes no 
significant narrowing of the canal. There is no significant spinal canal 
stenosis throughout the thoracic spine. Impression IMPRESSION: 
 
1. Acute, severe T12 compression fracture. Minimal bulging of the posterior inferior margin of the fracture, however this does not cause significant spinal 
canal stenosis. No evidence of extension of the fracture into the pedicles or 
posterior elements. 2. Chronic T7 compression fracture. 3. Mild to mild/moderate thoracic spondylosis. 23X 
829 CT Results (most recent): 
Results from Hospital Encounter encounter on 12/07/18 CT HEAD WO CONT Narrative INDICATION:  AMS, fall Examination: Unenhanced head CT. Axial images were obtained from skull base to 
the vertex. CT dose reduction was achieved through use of a standardized protocol tailored 
for this examination and are automatic exposure control for dose modulation dose 
reduction COMPARISON: 11/15/2016 FINDINGS: The ventricles and cisterns are normal in size and configuration for 
age. There are mild periventricular white matter changes. While nonspecific 
these are likely due to small vessel ischemic change. There is no evidence of 
midline shift, mass lesion or mass effect. No evidence of acute bleed or acute 
infarct. No depressed skull fractures. Impression IMPRESSION: 
1. Chronic appearing white matter changes. No acute abnormality Reviewed records in [place] and media tab today Lab Review Results for orders placed or performed during the hospital encounter of 12/07/18 URINE CULTURE HOLD SAMPLE Result Value Ref Range Urine culture hold URINE ON HOLD IN MICROBIOLOGY DEPT FOR 3 DAYS. IF UNPRESERVED URINE IS SUBMITTED, IT CANNOT BE USED FOR ADDITIONAL TESTING AFTER 24 HRS, RECOLLECTION WILL BE REQUIRED. CBC WITH AUTOMATED DIFF Result Value Ref Range WBC 12.5 (H) 3.6 - 11.0 K/uL  
 RBC 4.62 3.80 - 5.20 M/uL  
 HGB 14.4 11.5 - 16.0 g/dL HCT 42.5 35.0 - 47.0 % MCV 92.0 80.0 - 99.0 FL  
 MCH 31.2 26.0 - 34.0 PG  
 MCHC 33.9 30.0 - 36.5 g/dL  
 RDW 13.2 11.5 - 14.5 % PLATELET 406 777 - 467 K/uL MPV 9.8 8.9 - 12.9 FL  
 NRBC 0.0 0  WBC ABSOLUTE NRBC 0.00 0.00 - 0.01 K/uL NEUTROPHILS 85 (H) 32 - 75 % LYMPHOCYTES 8 (L) 12 - 49 % MONOCYTES 5 5 - 13 % EOSINOPHILS 1 0 - 7 % BASOPHILS 1 0 - 1 % IMMATURE GRANULOCYTES 1 (H) 0.0 - 0.5 % ABS. NEUTROPHILS 10.6 (H) 1.8 - 8.0 K/UL  
 ABS. LYMPHOCYTES 1.0 0.8 - 3.5 K/UL  
 ABS. MONOCYTES 0.7 0.0 - 1.0 K/UL  
 ABS. EOSINOPHILS 0.1 0.0 - 0.4 K/UL  
 ABS. BASOPHILS 0.1 0.0 - 0.1 K/UL  
 ABS. IMM. GRANS. 0.1 (H) 0.00 - 0.04 K/UL  
 DF AUTOMATED METABOLIC PANEL, COMPREHENSIVE Result Value Ref Range Sodium 133 (L) 136 - 145 mmol/L Potassium 4.7 3.5 - 5.1 mmol/L Chloride 98 97 - 108 mmol/L  
 CO2 26 21 - 32 mmol/L Anion gap 9 5 - 15 mmol/L Glucose 106 (H) 65 - 100 mg/dL BUN 13 6 - 20 MG/DL Creatinine 0.83 0.55 - 1.02 MG/DL  
 BUN/Creatinine ratio 16 12 - 20 GFR est AA >60 >60 ml/min/1.73m2 GFR est non-AA >60 >60 ml/min/1.73m2 Calcium 9.0 8.5 - 10.1 MG/DL Bilirubin, total 0.7 0.2 - 1.0 MG/DL  
 ALT (SGPT) 25 12 - 78 U/L  
 AST (SGOT) 31 15 - 37 U/L Alk. phosphatase 75 45 - 117 U/L Protein, total 7.2 6.4 - 8.2 g/dL Albumin 3.6 3.5 - 5.0 g/dL Globulin 3.6 2.0 - 4.0 g/dL A-G Ratio 1.0 (L) 1.1 - 2.2 URINALYSIS W/MICROSCOPIC Result Value Ref Range Color YELLOW/STRAW Appearance CLEAR CLEAR Specific gravity 1.014 1.003 - 1.030    
 pH (UA) 6.5 5.0 - 8.0 Protein TRACE (A) NEG mg/dL Glucose NEGATIVE  NEG mg/dL Ketone NEGATIVE  NEG mg/dL Bilirubin NEGATIVE  NEG Blood NEGATIVE  NEG Urobilinogen 0.2 0.2 - 1.0 EU/dL Nitrites NEGATIVE  NEG Leukocyte Esterase NEGATIVE  NEG    
 WBC 0-4 0 - 4 /hpf  
 RBC 0-5 0 - 5 /hpf Epithelial cells FEW FEW /lpf Bacteria NEGATIVE  NEG /hpf Hyaline cast 0-2 0 - 5 /lpf  
CK W/ REFLX CKMB Result Value Ref Range  26 - 192 U/L  
SAMPLES BEING HELD Result Value Ref Range SAMPLES BEING HELD RED,SST,THAD,GRN   
 COMMENT Add-on orders for these samples will be processed based on acceptable specimen integrity and analyte stability, which may vary by analyte. TROPONIN I Result Value Ref Range Troponin-I, Qt. 0.08 (H) <0.05 ng/mL TROPONIN I Result Value Ref Range Troponin-I, Qt. 0.07 (H) <0.05 ng/mL EKG, 12 LEAD, INITIAL Result Value Ref Range Ventricular Rate 56 BPM  
 Atrial Rate 56 BPM  
 P-R Interval 178 ms QRS Duration 60 ms  
 Q-T Interval 436 ms  
 QTC Calculation (Bezet) 420 ms Calculated P Axis 63 degrees Calculated R Axis 1 degrees Calculated T Axis 18 degrees Diagnosis Sinus bradycardia Possible Left atrial enlargement Septal infarct (cited on or before 15-NOV-2016) Abnormal ECG When compared with ECG of 15-NOV-2016 18:17, 
premature atrial complexes are no longer present Confirmed by Maryanne Meek MD., Hakeem ([id number]) on 12/7/2018 6:59:01 PM 
  
  
 
Exam: 
Visit Vitals /68 Pulse 68 Resp 18 Ht 4' 10\" (1.473 m) Wt 57.4 kg (126 lb 9.6 oz) SpO2 98% BMI 26.46 kg/m² General:  Alert, cooperative, no distress. Head:  Normocephalic, without obvious abnormality, atraumatic. Respiratory: 
Heart:   Non labored breathing Regular rate and rhythm, no murmurs Neck: Carotids 2+ no bruits Extremities: Warm, no cyanosis or edema. Pulses: 2+ radial pulses. Neurologic:  MS: Alert, speech intact. Language intact. Attention and fund of knowledge appropriate. Recent and remote memory intact. Cranial Nerves: 
II: visual fields VFF II: pupils PERRL  
II: optic disc   
III,VII: ptosis none III,IV,VI: extraocular muscles  EOMI, no nystagmus or diplopia V: facial light touch sensation VII: facial muscle function   symmetric VIII: hearing intact IX: soft palate elevation  Elevated sym XI: trapezius strength XI: sternocleidomastoid strength XII: tongue  midline Motor: normal bulk and tone, no tremor             Strength: 5/5 throughout, no PD 
 Coordination: FTN intact Gait: normal gait, slightly unsteady with turning, not using a cane Reflexes: 2+ symmetric Mini Mental State Exam 7/5/2017 1/5/2017 What is the Year 1 1 What is the Season 1 1 What is the Date 1 1 What is the Day 1 1 What is the Month 1 1 Where are we State 1 1 Where are we Country 1 1 Where are we [place] or [facility] 1 1 Where are we Floor 1 1 Name three objects, then ask the patient to say them 3 3 Serial sevens Subtract 7 from 100 in increments 5 5 Ask for the three objects repeated above 3 3 Name a pencil 1 1 Name a watch 1 1 Have the patient repeat this phrase \"No ifs, ands, or buts\" 1 1 Three stage command: Take the paper in your right hand 1 1 Fold the paper in half 1 1 Put the paper on the floor 1 1 Read and obey the following: CLOSE YOUR EYES 1 1 Have the patient write a sentence 1 1 Have the patient copy a figure 1 1 Mini Mental Score 30 30 Assessment/Plan Pt is an 80 y.o. RH female with probable epilepsy, hospitalized at [facility] 11/15-19/16 for episode of LOC at home resulting in a compression fracture at T7 and tongue trauma, with 2 prior episodes of unexplained LOC, the last in April, 2016 and associated with tongue trauma at that time as well. Additionally, MRI of the brain 11/15/16 with tiny acute infarct in the right deep white matter. EEG 11/15/16 was normal. Started on Keppra 250mg bid empirically for seizures without episode of LOC since and no side effects to med. Today mentions feeling blue. Exam is non-focal.   Continue Keppra 250mg bid. Discussed her mood, seems situational, it is January, the days are short, people tend to stay inside more, and she had a loss in the family that caused her to think of her own mortality and her family's well being.   They will monitor her mood, if not feeling better in the next month or two or if she started feeling worse, they are instructed to call. I reassured the pt that I do not see signs of dementia at this point, but at next visit, I will do another MMSE for reassurance. Follow-up in clinic in 6 months, instructed to call in the interim with any questions or concerns. ICD-10-CM ICD-9-CM 1. Seizures ([facility] R56.9 780.39 Signed: Farzaneh Campos MD 
1/29/2019

## 2019-02-06 ENCOUNTER — HOSPITAL ENCOUNTER (OUTPATIENT)
Dept: LAB | Age: 84
Discharge: HOME | End: 2019-02-06
Payer: MEDICARE

## 2019-02-06 PROCEDURE — 80061 LIPID PANEL: CPT

## 2019-02-06 PROCEDURE — 83550 IRON BINDING TEST: CPT

## 2019-02-06 PROCEDURE — 80053 COMPREHEN METABOLIC PANEL: CPT

## 2019-02-06 PROCEDURE — 36415 COLL VENOUS BLD VENIPUNCTURE: CPT

## 2019-02-06 PROCEDURE — 85025 COMPLETE CBC W/AUTO DIFF WBC: CPT

## 2019-02-07 LAB
ALBUMIN SERPL-MCNC: 4.3 G/DL (ref 3.5–4.7)
ALBUMIN/GLOB SERPL: 1.6 {RATIO} (ref 1.2–2.2)
ALP SERPL-CCNC: 88 IU/L (ref 39–117)
ALT SERPL-CCNC: 14 IU/L (ref 0–32)
AST SERPL-CCNC: 20 IU/L (ref 0–40)
BASOPHILS # BLD AUTO: 0 X10E3/UL (ref 0–0.2)
BASOPHILS NFR BLD AUTO: 0 %
BILIRUB SERPL-MCNC: 0.5 MG/DL (ref 0–1.2)
BUN SERPL-MCNC: 11 MG/DL (ref 8–27)
BUN/CREAT SERPL: 15 (ref 12–28)
CALCIUM SERPL-MCNC: 9.4 MG/DL (ref 8.7–10.3)
CHLORIDE SERPL-SCNC: 95 MMOL/L (ref 96–106)
CHOLEST SERPL-MCNC: 164 MG/DL (ref 100–199)
CO2 SERPL-SCNC: 21 MMOL/L (ref 20–29)
CREAT SERPL-MCNC: 0.71 MG/DL (ref 0.57–1)
EOSINOPHIL # BLD AUTO: 0.3 X10E3/UL (ref 0–0.4)
EOSINOPHIL NFR BLD AUTO: 3 %
ERYTHROCYTE [DISTWIDTH] IN BLOOD BY AUTOMATED COUNT: 13.4 % (ref 12.3–15.4)
GLOBULIN SER CALC-MCNC: 2.7 G/DL (ref 1.5–4.5)
GLUCOSE SERPL-MCNC: 89 MG/DL (ref 65–99)
HCT VFR BLD AUTO: 42.3 % (ref 34–46.6)
HDLC SERPL-MCNC: 66 MG/DL
HGB BLD-MCNC: 14 G/DL (ref 11.1–15.9)
IMM GRANULOCYTES # BLD AUTO: 0.1 X10E3/UL (ref 0–0.1)
IMM GRANULOCYTES NFR BLD AUTO: 1 %
INTERPRETATION, 910389: NORMAL
IRON SATN MFR SERPL: 13 % (ref 15–55)
IRON SERPL-MCNC: 39 UG/DL (ref 27–139)
LDLC SERPL CALC-MCNC: 76 MG/DL (ref 0–99)
LYMPHOCYTES # BLD AUTO: 1.4 X10E3/UL (ref 0.7–3.1)
LYMPHOCYTES NFR BLD AUTO: 12 %
MCH RBC QN AUTO: 30.3 PG (ref 26.6–33)
MCHC RBC AUTO-ENTMCNC: 33.1 G/DL (ref 31.5–35.7)
MCV RBC AUTO: 92 FL (ref 79–97)
MONOCYTES # BLD AUTO: 1 X10E3/UL (ref 0.1–0.9)
MONOCYTES NFR BLD AUTO: 9 %
NEUTROPHILS # BLD AUTO: 8.5 X10E3/UL (ref 1.4–7)
NEUTROPHILS NFR BLD AUTO: 75 %
PLATELET # BLD AUTO: 365 X10E3/UL (ref 150–379)
POTASSIUM SERPL-SCNC: 4.8 MMOL/L (ref 3.5–5.2)
PROT SERPL-MCNC: 7 G/DL (ref 6–8.5)
RBC # BLD AUTO: 4.62 X10E6/UL (ref 3.77–5.28)
SODIUM SERPL-SCNC: 132 MMOL/L (ref 134–144)
TIBC SERPL-MCNC: 303 UG/DL (ref 250–450)
TRIGL SERPL-MCNC: 110 MG/DL (ref 0–149)
UIBC SERPL-MCNC: 264 UG/DL (ref 118–369)
VLDLC SERPL CALC-MCNC: 22 MG/DL (ref 5–40)
WBC # BLD AUTO: 11.4 X10E3/UL (ref 3.4–10.8)

## 2019-02-20 ENCOUNTER — OFFICE VISIT (OUTPATIENT)
Dept: INTERNAL MEDICINE CLINIC | Age: 84
End: 2019-02-20

## 2019-02-20 VITALS
HEART RATE: 82 BPM | OXYGEN SATURATION: 99 % | RESPIRATION RATE: 18 BRPM | WEIGHT: 125 LBS | HEIGHT: 56 IN | TEMPERATURE: 96.2 F | SYSTOLIC BLOOD PRESSURE: 136 MMHG | DIASTOLIC BLOOD PRESSURE: 72 MMHG | BODY MASS INDEX: 28.12 KG/M2

## 2019-02-20 DIAGNOSIS — E78.00 HYPERCHOLESTEREMIA: Primary | ICD-10-CM

## 2019-02-20 DIAGNOSIS — M80.00XD OSTEOPOROSIS WITH CURRENT PATHOLOGICAL FRACTURE WITH ROUTINE HEALING, UNSPECIFIED OSTEOPOROSIS TYPE, SUBSEQUENT ENCOUNTER: ICD-10-CM

## 2019-02-20 DIAGNOSIS — I10 HTN (HYPERTENSION), BENIGN: ICD-10-CM

## 2019-02-20 NOTE — PROGRESS NOTES
1. Have you been to the ER, urgent care clinic since your last visit? Hospitalized since your last visit?yes. Dr Krysta Pichardo placed a pacemaker in Dec 2018 at [facility] 2. Have you seen or consulted any other health care providers outside of the [address] [place] [address] since your last visit? Include any pap smears or colon screening.  No

## 2019-02-20 NOTE — PROGRESS NOTES
Chief Complaint Patient presents with  COPD  Cerebrovascular Accident  Irregular Heart Beat SSS  Osteoporosis  Hypertension Chief Complaint Patient presents with  COPD  Cerebrovascular Accident  Irregular Heart Beat SSS  Osteoporosis  Hypertension Subjective: Lainey Reece is a 80 y.o. RH femal 
 
 
 
Patient Active Problem List  
 Diagnosis  Pacemaker Dual chamber pacer Medtronic 12/17/2018  Sick sinus syndrome due to SA node dysfunction (HCC) 6 second pause with syncope noted on ILR  
  
 PAF (paroxysmal atrial fibrillation) ([address]  Cerebrovascular accident (CVA) ([address]  Stroke ([address]  HTN (hypertension), benign  Diastolic dysfunction  Syncope and collapse  HEART (dyspnea on exertion)  COPD (chronic obstructive pulmonary disease) (HCC) Tiago Cogan lesion, chronic Seen on EGD 5/7/13  Iron deficiency anemia  Osteoporosis Fosamax  2005  No fractures  In all these years  Hypercholesteremia Past Surgical History:  
Procedure Laterality Date  CARDIAC SURG PROCEDURE UNLIST  2008  
 nuclear stress test normal   
 CARDIAC SURG PROCEDURE UNLIST  2018  
 pacemaker  ENDOSCOPY, COLON, DIAGNOSTIC  2008, 2013  HX CATARACT REMOVAL    
 HX GI  2013  
 egd   kia ulcer  HX ORTHOPAEDIC  2010  
 knee replacement  IMPLANT  LOOP RECORDER  11/17/2016  IL INS NEW/RPLCMT PRM PM W/TRANSV ELTRD ATRIAL&VENT  12/17/2018  IL RMVL IMPLANTABLE PT-ACTIVATED CAR EVENT RECORDER  12/17/2018 Denies gerd Vitals:  
 02/20/19 1417 BP: 151/89 Pulse: 82 Resp: 18 Temp: 96.2 °F (35.7 °C) TempSrc: Oral  
SpO2: 99% Weight: 125 lb (56.7 kg) Height: 4' 8.1\" (1.425 m) Vitals:  
 02/20/19 1417 02/20/19 1443 BP: 151/89 136/72 Pulse: 82 Resp: 18 Temp: 96.2 °F (35.7 °C) TempSrc: Oral   
SpO2: 99% Weight: 125 lb (56.7 kg) Height: 4' 8.1\" (1.425 m) S1 and S2 normal, no murmurs, clicks, gallops or rubs. Regular rate and rhythm. Chest is clear; no wheezes or rales. No edema or JVD. Neuro intact Uses a cane for ambulation support and balance New pacemaker 2 months ago Gilford Saunders  And did not change  
 hospitalized at [facility] 11/15-19/16 for episode of LOC at home resulting in a compression fracture at T7 and tongue trauma, with 2 prior episodes of unexplained LOC, the last in April and associated with tongue trauma at that time as well. Additionally, MRI of the brain with tiny acute infarct in the right deep white matter. Exam revealed upgoing toe on the left, otherwise unremarkable. EEG was normal. Episodes of LOC with tongue trauma were concerning for seizure. No known seizure risk factors other than age. Family denied concerns about dementia. Recommended starting Keppra 250 mg po bid given high risk for injury with subsequent seizures. The stroke seen on MRI would not have been responsible for LOC or seizure. It is hard to know if the two are related. She was started on aspirin 81 mg a day and recommended continued good control of her stroke risk factors. She was also seen by Cardiology and discharged with a loop recorder. (negative) 
was in rehab at [address] [place] [address] until 12/1/16. She has not had any subsequent episodes of LOC or unusual spells. She does not remember anything from her hospital stay and is repeating herself more. She is able to take her meds correctly everyday and is remembering her family and friends appropriately. Lives independently in a [facility] community. Cooks for herself  Follow up doing well now wioth no pain she is tolerating the fosamax Loose bowels better with benefiber Has uri with cough   For 2 weeks not worse no fever not ill  occ graham Past Surgical History:  
Procedure Laterality Date  CARDIAC SURG PROCEDURE UNLIST  2008  
 nuclear stress test normal   
 ENDOSCOPY, COLON, DIAGNOSTIC  2008, 2013  HX CATARACT REMOVAL    
 HX GI  2013  
 egd   kia ulcer  HX ORTHOPAEDIC  2010  
 knee replacement  IMPLANT  LOOP RECORDER  11/17/2016  CO INS NEW/RPLCMT PRM PM W/TRANSV ELTRD ATRIAL&VENT  12/17/2018  CO RMVL IMPLANTABLE PT-ACTIVATED CAR EVENT RECORDER  12/17/2018 Current Outpatient Medications Medication Sig Dispense Refill  omeprazole (PRILOSEC) 20 mg capsule TAKE ONE CAPSULE BY MOUTH EVERY DAY 30 MINUTES BEFORE A MEAL FOR STOMACH ACID 90 Cap 3  
 levETIRAcetam (KEPPRA) 250 mg tablet TAKE ONE TABLET BY MOUTH 2 TIMES A  Tab 3  
 metoprolol tartrate (LOPRESSOR) 50 mg tablet TAKE ONE TABLET BY MOUTH 2 TIMES A  Tab 3  
 vit C/E/zinc/lutein/zeaxanthin (OCUVITE EYE HEALTH PO) Take  by mouth two (2) times a day.  alendronate (FOSAMAX) 70 mg tablet Take 1 Tab by mouth every seven (7) days. 12 Tab 1  
 amLODIPine (NORVASC) 5 mg tablet TAKE ONE TABLET BY MOUTH EVERY DAY 90 Tab 3  cholecalciferol, vitamin D3, (VITAMIN D3) 2,000 unit tab Take  by mouth.  atorvastatin (LIPITOR) 20 mg tablet TAKE ONE TABLET BY MOUTH EVERY DAY 90 Tab 3  
 lisinopril (PRINIVIL, ZESTRIL) 5 mg tablet TAKE ONE TABLET BY MOUTH EVERY DAY 90 Tab 3  
 albuterol (PROAIR HFA) 90 mcg/actuation inhaler Take 2 Puffs by inhalation every four (4) hours as needed for Wheezing or Shortness of Breath. Ventolin brand requested 1 Inhaler 6  ferrous sulfate 325 mg (65 mg iron) tablet Take 1 Tab by mouth every fourty-eight (48) hours. Indications: Iron Deficiency Anemia 100 Tab 6  wheat dextrin (BENEFIBER SUGAR FREE, DEXTRIN,) 3 gram/3.8 gram powd Take  by mouth daily.  acetaminophen (TYLENOL) 500 mg tablet Take 2 Tabs by mouth every six (6) hours. 100 Tab 0  
 aspirin 81 mg chewable tablet Take 1 Tab by mouth daily. 30 Tab 0  
 CALCIUM CARBONATE (OYSTER SHELL CALCIUM 500 PO) Take 1 Tab by mouth daily.     
 latanoprost (XALATAN) 0.005 % ophthalmic solution Administer 1 Drop to both eyes nightly.  timolol (TIMOPTIC) 0.5 % ophthalmic solution Administer 1 Drop to right eye two (2) times a day. No Known Allergies Family History Problem Relation Age of Onset  Heart Disease Mother  Heart Disease Father  Heart Disease Sister  Alzheimer Sister  Alcohol abuse Brother Social History Tobacco Use  Smoking status: Former Smoker Years: 30.00 Types: Cigarettes Last attempt to quit: 2000 Years since quittin.4  Smokeless tobacco: Never Used Substance Use Topics  Alcohol use: Yes Alcohol/week: 3.6 oz Types: 6 Glasses of wine per week Patient Active Problem List  
Diagnosis Code  Osteoporosis M81.0  Hypercholesteremia E78.00  Iron deficiency anemia D50.9  Khadar lesion, chronic K25.7  HEART (dyspnea on exertion) R06.09  
 COPD (chronic obstructive pulmonary disease) ([facility]) J44.9  Syncope and collapse R55  Diastolic dysfunction Y30.3  
 HTN (hypertension), benign I10  Stroke ([facility] I63.9  Cerebrovascular accident (CVA) ([facility] I63.9  
 Pacemaker Z95.0  Sick sinus syndrome due to SA node dysfunction ([facility]) I49.5  PAF (paroxysmal atrial fibrillation) ([facility]) I48.0 Depression Risk Factor Screening:  
 
3 most recent PHQ Screens 2019 Little interest or pleasure in doing things Not at all Feeling down, depressed, irritable, or hopeless Several days Total Score PHQ 2 1 Alcohol Risk Factor Screening:  
 
 
 
Functional Ability and Level of Safety:  
 
Hearing Loss  
mild Activities of Daily Living Self-care. Requires assistance with: no ADLs Fall Risk Fall Risk Assessment, last 12 mths 2019 Able to walk? Yes Fall in past 12 months? Yes Fall with injury? No  
Number of falls in past 12 months 1 Fall Risk Score 1 Abuse Screen Patient is not abused Review of Systems Pertinent items are noted in HPI. Physical Examination Evaluation of Cognitive Function: 
Mood/affect:  happy Appearance: age appropriate Family member/caregiver input: family helps a lot No longer driving Left arm tingling  This morning  For 90 minutes today Visit Vitals /89 (BP 1 Location: Right arm, BP Patient Position: Sitting) Pulse 82 Temp 96.2 °F (35.7 °C) (Oral) Resp 18 Ht 4' 8.1\" (1.425 m) Wt 125 lb (56.7 kg) SpO2 99% BMI 27.92 kg/m² General appearance: alert, cooperative, no distress, appears stated age Lungs: clear to auscultation bilaterally Heart: regular rate and rhythm, S1, S2 normal, no murmur, click, rub or gallop Neurologic: Grossly normal  Saw neuro and note reviewed Lab Results Component Value Date/Time WBC 11.4 (H) 02/06/2019 02:57 PM  
 HGB 14.0 02/06/2019 02:57 PM  
 Hemoglobin (POC) 11.6 07/10/2013 09:09 PM  
 HCT 42.3 02/06/2019 02:57 PM  
 Hematocrit (POC) 34 (L) 07/10/2013 09:09 PM  
 PLATELET 343 63/18/3430 02:57 PM  
 MCV 92 02/06/2019 02:57 PM  
 
Lab Results Component Value Date/Time Cholesterol, total 164 02/06/2019 02:57 PM  
 HDL Cholesterol 66 02/06/2019 02:57 PM  
 LDL, calculated 76 02/06/2019 02:57 PM  
 Triglyceride 110 02/06/2019 02:57 PM  
 CHOL/HDL Ratio 1.9 11/16/2016 03:53 AM  
 
Lab Results Component Value Date/Time ALT (SGPT) 14 02/06/2019 02:57 PM  
 AST (SGOT) 20 02/06/2019 02:57 PM  
 Alk. phosphatase 88 02/06/2019 02:57 PM  
 Bilirubin, direct 0.1 01/06/2010 09:58 AM  
 Bilirubin, total 0.5 02/06/2019 02:57 PM  
 Albumin 4.3 02/06/2019 02:57 PM  
 Protein, total 7.0 02/06/2019 02:57 PM  
 INR 1.0 11/15/2016 10:10 AM  
 Prothrombin time 10.3 11/15/2016 10:10 AM  
 PLATELET 644 81/46/3220 02:57 PM  
 
 
Lab Results Component Value Date/Time  GFR est non-AA 78 02/06/2019 02:57 PM  
 GFRNA, POC 39 (L) 07/10/2013 09:09 PM  
 GFR est AA 90 02/06/2019 02:57 PM  
 GFRAA, POC 48 (L) 07/10/2013 09:09 PM  
 Creatinine 0.71 02/06/2019 02:57 PM  
 Creatinine (POC) 1.3 07/10/2013 09:09 PM  
 BUN 11 02/06/2019 02:57 PM  
 BUN (POC) 23 (H) 07/10/2013 09:09 PM  
 Sodium 132 (L) 02/06/2019 02:57 PM  
 Sodium (POC) 137 07/10/2013 09:09 PM  
 Potassium 4.8 02/06/2019 02:57 PM  
 Potassium (POC) 3.8 07/10/2013 09:09 PM  
 Chloride 95 (L) 02/06/2019 02:57 PM  
 Chloride (POC) 104 07/10/2013 09:09 PM  
 CO2 21 02/06/2019 02:57 PM  
 Magnesium 1.8 11/27/2016 04:20 AM  
 Phosphorus 4.2 11/27/2016 04:20 AM  
 
Lab Results Component Value Date/Time Iron 39 02/06/2019 02:57 PM  
 TIBC 303 02/06/2019 02:57 PM  
 Iron % saturation 13 (L) 02/06/2019 02:57 PM  
 Ferritin 120 07/05/2017 01:39 PM  
 
 
 
 
Patient Care Team: 
Nicole Edwards MD as PCP - Avril Vinson MD (Cardiology) Felipe Ambrosio MD as Physician (Neurology) Advice/Referrals/Counseling Education and counseling provided: 
Are appropriate based on today's review and evaluation End-of-Life planning (with patient's consent) Pneumococcal Vaccine Influenza Vaccine Assessment/Plan 1. HTN (hypertension), benign 
controlled 2. Iron deficiency anemia due to chronic blood loss Every other daily iron 3. Hypercholesteremia 
controlled 4. Osteoporosis with current pathological fracture with routine healing, unspecified osteoporosis type, subsequent encounter Doing ok on meds 5. Seizure ([address] stable 6. Cerebrovascular accident (CVA), unspecified mechanism ([address] Has recovered 7 asthma stable uses prn albuterol 8 new   pacer

## 2019-03-21 ENCOUNTER — OFFICE VISIT (OUTPATIENT)
Dept: CARDIOLOGY CLINIC | Age: 84
End: 2019-03-21

## 2019-03-21 DIAGNOSIS — Z95.0 CARDIAC PACEMAKER IN SITU: Primary | ICD-10-CM

## 2019-04-23 ENCOUNTER — CLINICAL SUPPORT (OUTPATIENT)
Dept: CARDIOLOGY CLINIC | Age: 84
End: 2019-04-23

## 2019-04-23 ENCOUNTER — OFFICE VISIT (OUTPATIENT)
Dept: CARDIOLOGY CLINIC | Age: 84
End: 2019-04-23

## 2019-04-23 VITALS
DIASTOLIC BLOOD PRESSURE: 64 MMHG | HEIGHT: 56 IN | HEART RATE: 74 BPM | BODY MASS INDEX: 29.02 KG/M2 | WEIGHT: 129 LBS | SYSTOLIC BLOOD PRESSURE: 138 MMHG

## 2019-04-23 DIAGNOSIS — I47.29 NSVT (NONSUSTAINED VENTRICULAR TACHYCARDIA): ICD-10-CM

## 2019-04-23 DIAGNOSIS — I48.92 ATRIAL FLUTTER, PAROXYSMAL (HCC): ICD-10-CM

## 2019-04-23 DIAGNOSIS — I47.1 PAT (PAROXYSMAL ATRIAL TACHYCARDIA) (HCC): ICD-10-CM

## 2019-04-23 DIAGNOSIS — Z95.0 CARDIAC PACEMAKER IN SITU: Primary | ICD-10-CM

## 2019-04-23 DIAGNOSIS — Z95.818 STATUS POST PLACEMENT OF IMPLANTABLE LOOP RECORDER: ICD-10-CM

## 2019-04-23 DIAGNOSIS — Z86.73 HX OF COMPLETED STROKE: ICD-10-CM

## 2019-04-23 DIAGNOSIS — I10 HTN (HYPERTENSION), BENIGN: ICD-10-CM

## 2019-04-23 DIAGNOSIS — I49.5 SICK SINUS SYNDROME (HCC): ICD-10-CM

## 2019-04-23 NOTE — PROGRESS NOTES
Cardiac Electrophysiology Office Note     Subjective: Heather Valdovinos is a 80 y.o. patient who is seen for follow up on pacemaker  She had syncope with 6 second pause  Since then she and her son said she does not have syncope any more nor seizure  She has ankle edema  She takes Bp daily  She has nausea in am before breakfast     ILR recorder showed 30 seconds of atrial flutter for 30 second on February 23, 2017     When I saw her in the past at [facility]:  She was seen for evaluation of syncope kindly referred by Dr. Alvaro Glynn.    PMHx includes chronic anemia, hypertension.    The patient reports she has had a total of 4 episodes of syncope, most recent one was yesterday, March 2016, 2010 and 2008.      She came to [facility], MRI of head revealed suspect small acute posterior frontal centrum semiovale lacunar infarct. MRA of neck- normal  Echo shows normal LVEF, no RWMA. Mild TR and pulmonic valve regurgitation. Xray of spine showed T7 compression fracture of unknown age.       Prior to this she had a similar episode in March of this year, she was getting ready for bed and while walking to the bed she collapsed and lacerated her lip on the bookcase, this did require a hospital visit and sutures. Episode in 2010, similar except she did have associated dizziness prior to fainting. She went to [facility] at this time and was told she had a TIA. Episode in 2008 as well.    She denies hx of MI/DVT/PE/DM. She is very independent at home. No issues ambulating.    No family hx of pacemaker.    Mother/father and brother CAD.      Problem List  Date Reviewed: 4/23/2019          Codes Class Noted    Pacemaker ICD-10-CM: Z95.0  ICD-9-CM: V45.01  12/17/2018    Overview Signed 12/17/2018 12:54 PM by Lucía Watts MD     Dual chamber pacer Medtronic 12/17/2018             Sick sinus syndrome due to SA node dysfunction [facility]) ICD-10-CM: I49.5  ICD-9-CM: 427.81  12/17/2018    Overview Signed 12/17/2018 12:55 PM by Lucía Watts MD 6 second pause with syncope noted on ILR              PAF (paroxysmal atrial fibrillation) (HCC) ICD-10-CM: I48.0  ICD-9-CM: 427.31  12/17/2018        Cerebrovascular accident (CVA) ([facility] ICD-10-CM: I63.9  ICD-9-CM: 434.91  8/14/2017        Stroke ([facility] ICD-10-CM: I63.9  ICD-9-CM: 434.91  11/16/2016        HTN (hypertension), benign ICD-10-CM: I10  ICD-9-CM: 401.1  6/92/1428        Diastolic dysfunction B-29-ZU: I51.89  ICD-9-CM: 429.9  4/1/2016        Syncope and collapse ICD-10-CM: R55  ICD-9-CM: 780.2  3/31/2016        HEART (dyspnea on exertion) ICD-10-CM: R06.09  ICD-9-CM: 786.09  6/12/2015        COPD (chronic obstructive pulmonary disease) ([facility] ICD-10-CM: J44.9  ICD-9-CM: 496  6/12/2015        Khadar lesion, chronic ICD-10-CM: K25.7  ICD-9-CM: 531.70  5/8/2013    Overview Signed 5/8/2013  5:52 PM by Nadeem Retana MD     Seen on EGD 5/7/13             Iron deficiency anemia ICD-10-CM: D50.9  ICD-9-CM: 280.9  9/18/2012        Osteoporosis ICD-10-CM: M81.0  ICD-9-CM: 733.00  9/22/2010    Overview Addendum 2/20/2019  2:58 PM by Nadeem Retana MD     Fosamax  2005  No fractures  In all these years    Restarted 2016             Hypercholesteremia ICD-10-CM: E78.00  ICD-9-CM: 272.0  9/22/2010              Current Outpatient Medications   Medication Sig Dispense Refill    atorvastatin (LIPITOR) 20 mg tablet TAKE ONE TABLET BY MOUTH EVERY DAY 90 Tab 3    lisinopril (PRINIVIL, ZESTRIL) 5 mg tablet TAKE ONE TABLET BY MOUTH EVERY DAY 90 Tab 3    omeprazole (PRILOSEC) 20 mg capsule TAKE ONE CAPSULE BY MOUTH EVERY DAY 30 MINUTES BEFORE A MEAL FOR STOMACH ACID 90 Cap 3    levETIRAcetam (KEPPRA) 250 mg tablet TAKE ONE TABLET BY MOUTH 2 TIMES A  Tab 3    metoprolol tartrate (LOPRESSOR) 50 mg tablet TAKE ONE TABLET BY MOUTH 2 TIMES A  Tab 3    vit C/E/zinc/lutein/zeaxanthin (OCUVITE EYE HEALTH PO) Take  by mouth two (2) times a day.       alendronate (FOSAMAX) 70 mg tablet Take 1 Tab by mouth every seven (7) days. 12 Tab 1    amLODIPine (NORVASC) 5 mg tablet TAKE ONE TABLET BY MOUTH EVERY DAY 90 Tab 3    cholecalciferol, vitamin D3, (VITAMIN D3) 2,000 unit tab Take  by mouth.  albuterol (PROAIR HFA) 90 mcg/actuation inhaler Take 2 Puffs by inhalation every four (4) hours as needed for Wheezing or Shortness of Breath. Ventolin brand requested 1 Inhaler 6    ferrous sulfate 325 mg (65 mg iron) tablet Take 1 Tab by mouth every fourty-eight (48) hours. Indications: Iron Deficiency Anemia 100 Tab 6    wheat dextrin (BENEFIBER SUGAR FREE, DEXTRIN,) 3 gram/3.8 gram powd Take  by mouth daily.  acetaminophen (TYLENOL) 500 mg tablet Take 2 Tabs by mouth every six (6) hours. 100 Tab 0    aspirin 81 mg chewable tablet Take 1 Tab by mouth daily. 30 Tab 0    CALCIUM CARBONATE (OYSTER SHELL CALCIUM 500 PO) Take 1 Tab by mouth daily.  latanoprost (XALATAN) 0.005 % ophthalmic solution Administer 1 Drop to both eyes nightly.  timolol (TIMOPTIC) 0.5 % ophthalmic solution Administer 1 Drop to right eye two (2) times a day. No Known Allergies  Past Medical History:   Diagnosis Date    Anemia     Arthritis     Khadar lesion, chronic 5/8/2013    Seen on EGD 5/7/13    Chronic obstructive pulmonary disease ([address]     CVA (cerebral vascular accident) ([address]     Tiny punctate infarct right frontal lobe, 10/2016.     HEART (dyspnea on exertion) 6/12/2015    GERD (gastroesophageal reflux disease)     Hypercholesterolemia     Hypertension 9/22/2010    Ill-defined condition     Osteoporosis 9/22/2010    Seizures ([address]     Sick sinus syndrome due to SA node dysfunction ([address] 12/17/2018     Past Surgical History:   Procedure Laterality Date    CARDIAC SURG PROCEDURE UNLIST  2008    nuclear stress test normal     CARDIAC SURG PROCEDURE UNLIST  2018    pacemaker    ENDOSCOPY, COLON, DIAGNOSTIC  2008, 2013    HX CATARACT REMOVAL      HX GI  2013    egd   khadar ulcer    HX ORTHOPAEDIC  2010 knee replacement    IMPLANT  LOOP RECORDER  2016         OR INS NEW/RPLCMT PRM PM W/TRANSV ELTRD ATRIAL&VENT  2018         OR RMVL IMPLANTABLE PT-ACTIVATED CAR EVENT RECORDER  2018          Family History   Problem Relation Age of Onset    Heart Disease Mother     Heart Disease Father     Heart Disease Sister     Alzheimer Sister     Alcohol abuse Brother      Social History     Tobacco Use    Smoking status: Former Smoker     Years: 30.00     Types: Cigarettes     Last attempt to quit: 2000     Years since quittin.5    Smokeless tobacco: Never Used   Substance Use Topics    Alcohol use: Yes     Alcohol/week: 3.6 oz     Types: 6 Glasses of wine per week        Review of Systems:   Constitutional: Negative for fever, chills, weight loss, malaise/fatigue. HEENT: Negative for nosebleeds, vision changes. Respiratory: Negative for cough, hemoptysis  Cardiovascular: Negative for chest pain, palpitations, orthopnea, claudication, syncope, and PND. Gastrointestinal: Negative for nausea, vomiting, soft stools    Genitourinary: Negative for dysuria, and hematuria. Musculoskeletal: Negative for myalgias, arthralgia. Skin: Negative for rash. Heme: Does not bleed or bruise easily. Neurological: Negative for speech change and focal weakness     Objective:     Visit Vitals  /64   Pulse 74   Ht 4' 8.1\" (1.425 m)   Wt 129 lb (58.5 kg)   BMI 28.82 kg/m²       Physical Exam:   Constitutional: well-nourished. No respiratory distress. Head: Normocephalic and atraumatic. Eyes: Pupils are equal, round  ENT: hearing normal  Neck: supple. No JVD present. Cardiovascular: Normal rate, regular rhythm. Exam reveals no gallop and no friction rub. No murmur heard. Pulmonary/Chest: Effort normal and breath sounds normal. No wheezes. Abdominal: Soft, no tenderness. mild obesity  Musculoskeletal: trace ankle edema. Neurological: alert,oriented.  walk in with a cane  Skin: Skin is warm and dry. Left sided pacemaker site healed  Psychiatric: normal mood and affect. Behavior is normal. Judgment and thought content normal.         Assessment/Plan:       ICD-10-CM ICD-9-CM    1. Cardiac pacemaker in situ Z95.0 V45.01    2. Atrial flutter, paroxysmal (HCC) I48.92 427.32    3. PAT (paroxysmal atrial tachycardia) ([facility]) I47.1 427.0    4. HTN (hypertension), benign I10 401.1    5. NSVT (nonsustained ventricular tachycardia) ([facility]) I47.2 427.1    6. Status post placement of implantable loop recorder Z95.818 V45.09    7. Sick sinus syndrome (HCC) I49.5 427.81    8. Hx of completed stroke Z86.73 V12.54       Reviewed pacemaker check with her and her   She has BP controlled. She has had PAT episodes for 1-3 seconds  She has 73% atrial pacing  Discussed possibility of starting a [address] if he continues to have more episodes of atrial flutter or fibrillation. I told him and her that my decisions about anticoagulant may change over time depends on the frequency and duration of atrial flutter. She is seeing Dr Shekhar Torres of neurology  If she does not have fall or seizure and recurrent atrial flutter, fibrillation I would consider [address]  Future Appointments   Date Time Provider Ian Guerrier   6/26/2019  3:15 PM Mathew David MD [facility]   7/31/2019 11:40 AM Marilin Drake MD [address] [place] [address]   7/31/2019  1:45 PM Shivani Livingston, [address]   11/4/2019  1:00 PM REMOTE1, [address]   2/10/2020  8:00 AM REMOTE1, [address]   5/14/2020 10:30 AM PACEMAKER3, [address]   5/14/2020 10:40 AM Shayy Person  [address]       Thank you for involving me in this patient's care and please call with further concerns or questions. Denis Amor M.D.   Electrophysiology/Cardiology  [facility] [place]  [address], [place] [address], [address]  [address] [facility], [facility] [address]  [phone]                                        [phone]

## 2019-06-11 DIAGNOSIS — M80.00XD OSTEOPOROSIS WITH CURRENT PATHOLOGICAL FRACTURE WITH ROUTINE HEALING, UNSPECIFIED OSTEOPOROSIS TYPE, SUBSEQUENT ENCOUNTER: ICD-10-CM

## 2019-06-11 RX ORDER — AMLODIPINE BESYLATE 5 MG/1
TABLET ORAL
Qty: 90 TAB | Refills: 0 | Status: SHIPPED | OUTPATIENT
Start: 2019-06-11 | End: 2019-09-15 | Stop reason: SDUPTHER

## 2019-06-11 RX ORDER — ALENDRONATE SODIUM 70 MG/1
TABLET ORAL
Qty: 12 TAB | Refills: 0 | Status: SHIPPED | OUTPATIENT
Start: 2019-06-11 | End: 2019-06-26 | Stop reason: SDUPTHER

## 2019-06-11 RX ORDER — ALBUTEROL SULFATE 90 UG/1
AEROSOL, METERED RESPIRATORY (INHALATION)
Qty: 1 INHALER | Refills: 2 | Status: SHIPPED | OUTPATIENT
Start: 2019-06-11 | End: 2020-05-12

## 2019-06-21 LAB
ALBUMIN SERPL-MCNC: 4.5 G/DL (ref 3.5–4.7)
ALBUMIN/GLOB SERPL: 1.7 {RATIO} (ref 1.2–2.2)
ALP SERPL-CCNC: 70 IU/L (ref 39–117)
ALT SERPL-CCNC: 9 IU/L (ref 0–32)
AST SERPL-CCNC: 17 IU/L (ref 0–40)
BASOPHILS # BLD AUTO: 0.1 X10E3/UL (ref 0–0.2)
BASOPHILS NFR BLD AUTO: 1 %
BILIRUB SERPL-MCNC: 0.9 MG/DL (ref 0–1.2)
BUN SERPL-MCNC: 12 MG/DL (ref 8–27)
BUN/CREAT SERPL: 13 (ref 12–28)
CALCIUM SERPL-MCNC: 9.6 MG/DL (ref 8.7–10.3)
CHLORIDE SERPL-SCNC: 98 MMOL/L (ref 96–106)
CHOLEST SERPL-MCNC: 201 MG/DL (ref 100–199)
CO2 SERPL-SCNC: 22 MMOL/L (ref 20–29)
CREAT SERPL-MCNC: 0.93 MG/DL (ref 0.57–1)
EOSINOPHIL # BLD AUTO: 0.3 X10E3/UL (ref 0–0.4)
EOSINOPHIL NFR BLD AUTO: 3 %
ERYTHROCYTE [DISTWIDTH] IN BLOOD BY AUTOMATED COUNT: 14.3 % (ref 12.3–15.4)
GLOBULIN SER CALC-MCNC: 2.6 G/DL (ref 1.5–4.5)
GLUCOSE SERPL-MCNC: 87 MG/DL (ref 65–99)
HCT VFR BLD AUTO: 44.4 % (ref 34–46.6)
HDLC SERPL-MCNC: 82 MG/DL
HGB BLD-MCNC: 14.4 G/DL (ref 11.1–15.9)
IMM GRANULOCYTES # BLD AUTO: 0 X10E3/UL (ref 0–0.1)
IMM GRANULOCYTES NFR BLD AUTO: 0 %
INTERPRETATION, 910389: NORMAL
INTERPRETATION: NORMAL
LDLC SERPL CALC-MCNC: 100 MG/DL (ref 0–99)
LYMPHOCYTES # BLD AUTO: 1.4 X10E3/UL (ref 0.7–3.1)
LYMPHOCYTES NFR BLD AUTO: 13 %
MCH RBC QN AUTO: 29.9 PG (ref 26.6–33)
MCHC RBC AUTO-ENTMCNC: 32.4 G/DL (ref 31.5–35.7)
MCV RBC AUTO: 92 FL (ref 79–97)
MONOCYTES # BLD AUTO: 0.9 X10E3/UL (ref 0.1–0.9)
MONOCYTES NFR BLD AUTO: 8 %
NEUTROPHILS # BLD AUTO: 8.4 X10E3/UL (ref 1.4–7)
NEUTROPHILS NFR BLD AUTO: 75 %
PDF IMAGE, 910387: NORMAL
PLATELET # BLD AUTO: 328 X10E3/UL (ref 150–450)
POTASSIUM SERPL-SCNC: 4.9 MMOL/L (ref 3.5–5.2)
PROT SERPL-MCNC: 7.1 G/DL (ref 6–8.5)
RBC # BLD AUTO: 4.81 X10E6/UL (ref 3.77–5.28)
SODIUM SERPL-SCNC: 136 MMOL/L (ref 134–144)
TRIGL SERPL-MCNC: 93 MG/DL (ref 0–149)
VLDLC SERPL CALC-MCNC: 19 MG/DL (ref 5–40)
WBC # BLD AUTO: 11.1 X10E3/UL (ref 3.4–10.8)

## 2019-06-26 ENCOUNTER — OFFICE VISIT (OUTPATIENT)
Dept: INTERNAL MEDICINE CLINIC | Age: 84
End: 2019-06-26

## 2019-06-26 VITALS
WEIGHT: 125 LBS | OXYGEN SATURATION: 97 % | DIASTOLIC BLOOD PRESSURE: 73 MMHG | BODY MASS INDEX: 28.12 KG/M2 | HEART RATE: 88 BPM | SYSTOLIC BLOOD PRESSURE: 122 MMHG | TEMPERATURE: 96.9 F | HEIGHT: 56 IN | RESPIRATION RATE: 18 BRPM

## 2019-06-26 DIAGNOSIS — D50.0 IRON DEFICIENCY ANEMIA DUE TO CHRONIC BLOOD LOSS: Primary | ICD-10-CM

## 2019-06-26 DIAGNOSIS — I10 HTN (HYPERTENSION), BENIGN: ICD-10-CM

## 2019-06-26 DIAGNOSIS — Z86.73 HISTORY OF STROKE: ICD-10-CM

## 2019-06-26 DIAGNOSIS — I63.9 CEREBROVASCULAR ACCIDENT (CVA), UNSPECIFIED MECHANISM (HCC): ICD-10-CM

## 2019-06-26 DIAGNOSIS — M80.00XD OSTEOPOROSIS WITH CURRENT PATHOLOGICAL FRACTURE WITH ROUTINE HEALING, UNSPECIFIED OSTEOPOROSIS TYPE, SUBSEQUENT ENCOUNTER: ICD-10-CM

## 2019-06-26 NOTE — PROGRESS NOTES
Chief Complaint   Patient presents with    COPD    Cerebrovascular Accident     hx    Hypertension    Osteoporosis    Cholesterol Problem     Chief Complaint   Patient presents with    COPD    Cerebrovascular Accident     hx    Hypertension    Osteoporosis    Cholesterol Problem     Subjective:    Heather Valdovinos is a 80 y.o. RH femal        Patient Active Problem List    Diagnosis    Pacemaker     Dual chamber pacer [employer] 12/17/2018      Sick sinus syndrome due to SA node dysfunction (HCC)     6 second pause with syncope noted on ILR       PAF (paroxysmal atrial fibrillation) ([address]    Cerebrovascular accident (CVA) ([address]    Stroke ([address]    HTN (hypertension), benign    Diastolic dysfunction    Syncope and collapse    HEART (dyspnea on exertion)    COPD (chronic obstructive pulmonary disease) ([address]    Khadar lesion, chronic     Seen on EGD 5/7/13      Iron deficiency anemia    Osteoporosis     Fosamax  2005  No fractures  In all these years    Restarted 2016      Hypercholesteremia     Past Surgical History:   Procedure Laterality Date    CARDIAC SURG PROCEDURE UNLIST  2008    nuclear stress test normal     CARDIAC SURG PROCEDURE UNLIST  2018    pacemaker    ENDOSCOPY, COLON, DIAGNOSTIC  2008, 2013    HX CATARACT REMOVAL      HX GI  2013    egd   khadar ulcer    HX ORTHOPAEDIC  2010    knee replacement    IMPLANT  LOOP RECORDER  11/17/2016         [place] INS NEW/RPLCMT PRM PM W/TRANSV ELTRD ATRIAL&VENT  12/17/2018         [place] RMVL IMPLANTABLE PT-ACTIVATED CAR EVENT RECORDER  12/17/2018            Denies gerd    Vitals:    06/26/19 1514   BP: 122/73   Pulse: 88   Resp: 18   Temp: 96.9 °F (36.1 °C)   TempSrc: Oral   SpO2: 97%   Weight: 125 lb (56.7 kg)   Height: 4' 8.1\" (1.425 m)     Vitals:    06/26/19 1514   BP: 122/73   Pulse: 88   Resp: 18   Temp: 96.9 °F (36.1 °C)   TempSrc: Oral   SpO2: 97%   Weight: 125 lb (56.7 kg)   Height: 4' 8.1\" (1.425 m)       S1 and S2 normal, no murmurs, clicks, gallops or rubs. Regular rate and rhythm. Chest is clear; no wheezes or rales. No edema or JVD. Neuro intact  Uses a cane for ambulation support and balance balance has improved           hospitalized at [facility] 11/15-19/16 for episode of LOC at home resulting in a compression fracture at T7 and tongue trauma, with 2 prior episodes of unexplained LOC, the last in April and associated with tongue trauma at that time as well. Additionally, MRI of the brain with tiny acute infarct in the right deep white matter. Exam revealed upgoing toe on the left, otherwise unremarkable. EEG was normal. Episodes of LOC with tongue trauma were concerning for seizure. No known seizure risk factors other than age. Family denied concerns about dementia. Recommended starting Keppra 250 mg po bid given high risk for injury with subsequent seizures. The stroke seen on MRI would not have been responsible for LOC or seizure. It is hard to know if the two are related. She was started on aspirin 81 mg a day and recommended continued good control of her stroke risk factors. She was also seen by Cardiology and discharged with a loop recorder. (negative)  was in rehab at [address] [place] [address] until 12/1/16. She has not had any subsequent episodes of LOC or unusual spells. She does not remember anything from her hospital stay and is repeating herself more. She is able to take her meds correctly everyday and is remembering her family and friends appropriately. Lives independently in a shelter community.  Cooks for herself  Follow up doing well now wioth no pain she is tolerating the fosamax  So far     Loose bowels better with fib  Past Surgical History:   Procedure Laterality Date    CARDIAC SURG PROCEDURE UNLIST  2008    nuclear stress test normal     CARDIAC SURG PROCEDURE UNLIST  2018    pacemaker    ENDOSCOPY, COLON, DIAGNOSTIC  2008, 2013    HX CATARACT REMOVAL      HX GI  2013    egd   kia ulcer    HX ORTHOPAEDIC  2010 knee replacement    IMPLANT  LOOP RECORDER  11/17/2016         [place] INS NEW/RPLCMT PRM PM W/TRANSV ELTRD ATRIAL&VENT  12/17/2018         [place] RMVL IMPLANTABLE PT-ACTIVATED CAR EVENT RECORDER  12/17/2018          Current Outpatient Medications   Medication Sig Dispense Refill    PROAIR HFA 90 mcg/actuation inhaler INHALE 2 PUFFS BY MOUTH EVERY 4 HOURS AS NEEDED FOR WHEEZING OR SHORTNESS OF BREATH 1 Inhaler 2    amLODIPine (NORVASC) 5 mg tablet TAKE ONE TABLET BY MOUTH EVERY DAY 90 Tab 0    atorvastatin (LIPITOR) 20 mg tablet TAKE ONE TABLET BY MOUTH EVERY DAY 90 Tab 3    lisinopril (PRINIVIL, ZESTRIL) 5 mg tablet TAKE ONE TABLET BY MOUTH EVERY DAY 90 Tab 3    omeprazole (PRILOSEC) 20 mg capsule TAKE ONE CAPSULE BY MOUTH EVERY DAY 30 MINUTES BEFORE A MEAL FOR STOMACH ACID 90 Cap 3    levETIRAcetam (KEPPRA) 250 mg tablet TAKE ONE TABLET BY MOUTH 2 TIMES A  Tab 3    metoprolol tartrate (LOPRESSOR) 50 mg tablet TAKE ONE TABLET BY MOUTH 2 TIMES A  Tab 3    vit C/E/zinc/lutein/zeaxanthin (OCUVITE EYE HEALTH PO) Take  by mouth two (2) times a day.  alendronate (FOSAMAX) 70 mg tablet Take 1 Tab by mouth every seven (7) days. 12 Tab 1    cholecalciferol, vitamin D3, (VITAMIN D3) 2,000 unit tab Take  by mouth.  albuterol (PROAIR HFA) 90 mcg/actuation inhaler Take 2 Puffs by inhalation every four (4) hours as needed for Wheezing or Shortness of Breath. Ventolin brand requested 1 Inhaler 6    ferrous sulfate 325 mg (65 mg iron) tablet Take 1 Tab by mouth every fourty-eight (48) hours. Indications: Iron Deficiency Anemia 100 Tab 6    wheat dextrin (BENEFIBER SUGAR FREE, DEXTRIN,) 3 gram/3.8 gram powd Take  by mouth daily.  acetaminophen (TYLENOL) 500 mg tablet Take 2 Tabs by mouth every six (6) hours. 100 Tab 0    aspirin 81 mg chewable tablet Take 1 Tab by mouth daily. 30 Tab 0    CALCIUM CARBONATE (OYSTER SHELL CALCIUM 500 PO) Take 1 Tab by mouth daily.       latanoprost (XALATAN) 0.005 % ophthalmic solution Administer 1 Drop to both eyes nightly.  timolol (TIMOPTIC) 0.5 % ophthalmic solution Administer 1 Drop to right eye two (2) times a day. No Known Allergies  Family History   Problem Relation Age of Onset    Heart Disease Mother     Heart Disease Father     Heart Disease Sister     Alzheimer Sister     Alcohol abuse Brother      Social History     Tobacco Use    Smoking status: Former Smoker     Years: 30.00     Types: Cigarettes     Last attempt to quit: 2000     Years since quittin.7    Smokeless tobacco: Never Used   Substance Use Topics    Alcohol use: Yes     Alcohol/week: 3.6 oz     Types: 6 Glasses of wine per week     Patient Active Problem List   Diagnosis Code    Osteoporosis M81.0    Hypercholesteremia E78.00    Iron deficiency anemia D50.9    Khadar lesion, chronic K25.7    HEART (dyspnea on exertion) R06.09    COPD (chronic obstructive pulmonary disease) ([facility] J44.9    Syncope and collapse I40    Diastolic dysfunction X39.39    HTN (hypertension), benign I10    Stroke ([facility]) I63.9    Cerebrovascular accident (CVA) ([facility] I63.9    Pacemaker Z95.0    Sick sinus syndrome due to SA node dysfunction ([facility]) I49.5    PAF (paroxysmal atrial fibrillation) ([facility]) I48.0       Alcohol Risk Factor Screening:         Functional Ability and Level of Safety:     Hearing Loss   mild    Activities of Daily Living   Self-care. Requires assistance with: no ADLs    Fall Risk     Fall Risk Assessment, last 12 mths 2019   Able to walk? Yes   Fall in past 12 months? Yes   Fall with injury? No   Number of falls in past 12 months 1   Fall Risk Score 1     Abuse Screen   Patient is not abused    Review of Systems   Pertinent items are noted in HPI.     Physical Examination     Evaluation of Cognitive Function:  Mood/affect:  happy  Appearance: age appropriate  Family member/caregiver input: family helps a lot  No longer driving episode left arm hand pain a few nights ago went away with rolling over    Left arm tingling  This morning  For 90 minutes today    Visit Vitals  /73 (BP 1 Location: Left arm, BP Patient Position: Sitting)   Pulse 88   Temp 96.9 °F (36.1 °C) (Oral)   Resp 18   Ht 4' 8.1\" (1.425 m)   Wt 125 lb (56.7 kg)   SpO2 97%   BMI 27.92 kg/m²     General appearance: alert, cooperative, no distress, appears stated age  Lungs: clear to auscultation bilaterally  Heart: regular rate and rhythm, S1, S2 normal, no murmur, click, rub or gallop  Neurologic: Grossly normal  Saw neuro and note reviewed    Mobility better    . get up and go  quickly      Lab Results   Component Value Date/Time    WBC 11.1 (H) 06/20/2019 10:29 AM    HGB 14.4 06/20/2019 10:29 AM    Hemoglobin (POC) 11.6 07/10/2013 09:09 PM    HCT 44.4 06/20/2019 10:29 AM    Hematocrit (POC) 34 (L) 07/10/2013 09:09 PM    PLATELET 022 12/15/8599 10:29 AM    MCV 92 06/20/2019 10:29 AM     Lab Results   Component Value Date/Time    Cholesterol, total 201 (H) 06/20/2019 10:29 AM    HDL Cholesterol 82 06/20/2019 10:29 AM    LDL, calculated 100 (H) 06/20/2019 10:29 AM    Triglyceride 93 06/20/2019 10:29 AM    CHOL/HDL Ratio 1.9 11/16/2016 03:53 AM     Lab Results   Component Value Date/Time    ALT (SGPT) 9 06/20/2019 10:29 AM    AST (SGOT) 17 06/20/2019 10:29 AM    Alk.  phosphatase 70 06/20/2019 10:29 AM    Bilirubin, direct 0.1 01/06/2010 09:58 AM    Bilirubin, total 0.9 06/20/2019 10:29 AM    Albumin 4.5 06/20/2019 10:29 AM    Protein, total 7.1 06/20/2019 10:29 AM    INR 1.0 11/15/2016 10:10 AM    Prothrombin time 10.3 11/15/2016 10:10 AM    PLATELET 602 79/23/6550 10:29 AM       Lab Results   Component Value Date/Time    GFR est non-AA 56 (L) 06/20/2019 10:29 AM    GFRNA, POC 39 (L) 07/10/2013 09:09 PM    GFR est AA 64 06/20/2019 10:29 AM    GFRAA, POC 48 (L) 07/10/2013 09:09 PM    Creatinine 0.93 06/20/2019 10:29 AM    Creatinine (POC) 1.3 07/10/2013 09:09 PM    BUN 12 06/20/2019 10:29 AM    BUN (POC) 23 (H) 07/10/2013 09:09 PM    Sodium 136 06/20/2019 10:29 AM    Sodium (POC) 137 07/10/2013 09:09 PM    Potassium 4.9 06/20/2019 10:29 AM    Potassium (POC) 3.8 07/10/2013 09:09 PM    Chloride 98 06/20/2019 10:29 AM    Chloride (POC) 104 07/10/2013 09:09 PM    CO2 22 06/20/2019 10:29 AM    Magnesium 1.8 11/27/2016 04:20 AM    Phosphorus 4.2 11/27/2016 04:20 AM     Lab Results   Component Value Date/Time    Iron 39 02/06/2019 02:57 PM    TIBC 303 02/06/2019 02:57 PM    Iron % saturation 13 (L) 02/06/2019 02:57 PM    Ferritin 120 07/05/2017 01:39 PM   1. Iron deficiency anemia due to chronic blood loss  On iron stable    2. HTN (hypertension), benign  controlled  - CBC WITH AUTOMATED DIFF  - LIPID PANEL  - METABOLIC PANEL, COMPREHENSIVE    3. Osteoporosis with current pathological fracture with routine healing, unspecified osteoporosis type, subsequent encounter  On therapy    4. Cerebrovascular accident (CVA), unspecified mechanism ([facility]  stable    5.  History of stroke  2 years ago    Symptoms from this week discussed

## 2019-07-29 ENCOUNTER — TELEPHONE (OUTPATIENT)
Dept: CARDIOLOGY CLINIC | Age: 84
End: 2019-07-29

## 2019-07-29 NOTE — TELEPHONE ENCOUNTER
Patient would to speak with you regarding a remote check that she is to have on 7/31. She would like to know what to do, she states that this is her first time and she wants to make sure she does everything correctly. Patient will not be home from 12-3pm today 7/29.     Phone: [phone]

## 2019-07-31 ENCOUNTER — OFFICE VISIT (OUTPATIENT)
Dept: NEUROLOGY | Age: 84
End: 2019-07-31

## 2019-07-31 ENCOUNTER — OFFICE VISIT (OUTPATIENT)
Dept: CARDIOLOGY CLINIC | Age: 84
End: 2019-07-31

## 2019-07-31 VITALS
SYSTOLIC BLOOD PRESSURE: 100 MMHG | WEIGHT: 125.6 LBS | HEIGHT: 56 IN | RESPIRATION RATE: 16 BRPM | BODY MASS INDEX: 28.25 KG/M2 | DIASTOLIC BLOOD PRESSURE: 60 MMHG | HEART RATE: 60 BPM | OXYGEN SATURATION: 98 %

## 2019-07-31 DIAGNOSIS — R56.9 SEIZURES (HCC): Primary | ICD-10-CM

## 2019-07-31 DIAGNOSIS — Z95.0 CARDIAC PACEMAKER IN SITU: Primary | ICD-10-CM

## 2019-07-31 NOTE — PROGRESS NOTES
Ms. Chico Saleem presents today to follow up seizures. Her last seizure was November 2016. Depression screening done on this patient.

## 2019-07-31 NOTE — PROGRESS NOTES
Neurology Progress Note    HISTORY PROVIDED BY: patient and Son    Chief Complaint:   Chief Complaint   Patient presents with    Neurologic Problem    Seizure      Subjective:   Pt is an 80 y.o. RH female last seen in clinic on 1/29/19 in f/u for probable epilepsy, hospitalized at [facility] 11/15-19/16 for episode of LOC at home resulting in a compression fracture at T7 and tongue trauma, with 2 prior episodes of unexplained LOC, the last in April, 2016 and associated with tongue trauma at that time as well. Additionally, MRI of the brain 11/15/16 with tiny acute infarct in the right deep white matter. EEG 11/15/16 was normal. Started on Keppra 250mg bid empirically for seizures without episode of LOC since and no side effects to med. Today mentions feeling blue. Exam was non-focal.   Continued Keppra 250mg bid. Discussed her mood, seems situational, it is January, the days are short, people tend to stay inside more, and she had a loss in the family that caused her to think of her own mortality and her family's well being. They will monitor her mood, if not feeling better in the next month or two or if she started feeling worse, they are instructed to call. I reassured the pt that I do not see signs of dementia at this point, but at next visit, I will do another MMSE for reassurance. She returns for f/u. She is doing well, no spells or seizures since last visit. No new medical issues. Does report a \"strange dream\" one night, got out of bed and alerted security to call an ambulance b/c her  was lying on the floor. She hasn't seen her ex- in 3 years and has not been  for 30 years. It was very realistic, it was like he was lying on the floor. She quickly realized this was a dream and called security back to cancel ambulance. It was about 3AM. This is the only time this has happened.  No h/o acting out dreams, does not wake up outside of her bed, does not find items disheveled in the mornings. Past Medical History:   Diagnosis Date    Anemia     Arthritis     Ej Horicon lesion, chronic 2013    Seen on EGD 13    Chronic obstructive pulmonary disease ([address]     CVA [facility]cerebral vascular [facility]) [facility] [facility]     Tiny punctate infarct right frontal lobe, 10/2016.  HEART (dyspnea on exertion) 2015    GERD (gastroesophageal reflux disease)     Hypercholesterolemia     Hypertension 2010    Ill-defined condition     Osteoporosis 2010    Seizures ([address]     Sick sinus syndrome due to SA node dysfunction ([address] 2018      Past Surgical History:   Procedure Laterality Date    CARDIAC SURG PROCEDURE UNLIST      nuclear stress test normal     CARDIAC SURG PROCEDURE UNLIST      pacemaker    ENDOSCOPY, COLON, DIAGNOSTIC  ,     HX CATARACT REMOVAL      HX GI      egd   kia ulcer    HX ORTHOPAEDIC      knee replacement    IMPLANT  LOOP RECORDER  2016         MS INS NEW/RPLCMT PRM PM W/TRANSV ELTRD ATRIAL&VENT  2018         MS RMVL IMPLANTABLE PT-ACTIVATED CAR EVENT RECORDER  2018           Social History     Socioeconomic History    Marital status: SINGLE     Spouse name: Not on file    Number of children: Not on file    Years of education: Not on file    Highest education level: Not on file   Occupational History    Not on file   Social Needs    Financial resource strain: Not on file    Food insecurity:     Worry: Not on file     Inability: Not on file    Transportation needs:     Medical: Not on file     Non-medical: Not on file   Tobacco Use    Smoking status: Former Smoker     Years: 30.00     Types: Cigarettes     Last attempt to quit: 2000     Years since quittin.8    Smokeless tobacco: Never Used   Substance and Sexual Activity    Alcohol use:  Yes     Alcohol/week: 6.0 standard drinks     Types: 6 Glasses of wine per week    Drug use: No    Sexual activity: Not Currently   Lifestyle    Physical activity:     Days per week: Not on file     Minutes per session: Not on file    Stress: Not on file   Relationships    Social connections:     Talks on phone: Not on file     Gets together: Not on file     Attends Tenriism service: Not on file     Active member of club or organization: Not on file     Attends meetings of clubs or organizations: Not on file     Relationship status: Not on file    Intimate partner violence:     Fear of current or ex partner: Not on file     Emotionally abused: Not on file     Physically abused: Not on file     Forced sexual activity: Not on file   Other Topics Concern    Not on file   Social History Narrative    Not on file     Family History   Problem Relation Age of Onset    Heart Disease Mother     Heart Disease Father     Heart Disease Sister     Alzheimer Sister     Alcohol abuse Brother           Objective:   ROS:  Per HPI o/w neg    No Known Allergies    Meds:  Outpatient Medications Prior to Visit   Medication Sig Dispense Refill    PROAIR HFA 90 mcg/actuation inhaler INHALE 2 PUFFS BY MOUTH EVERY 4 HOURS AS NEEDED FOR WHEEZING OR SHORTNESS OF BREATH 1 Inhaler 2    amLODIPine (NORVASC) 5 mg tablet TAKE ONE TABLET BY MOUTH EVERY DAY 90 Tab 0    atorvastatin (LIPITOR) 20 mg tablet TAKE ONE TABLET BY MOUTH EVERY DAY 90 Tab 3    lisinopril (PRINIVIL, ZESTRIL) 5 mg tablet TAKE ONE TABLET BY MOUTH EVERY DAY 90 Tab 3    omeprazole (PRILOSEC) 20 mg capsule TAKE ONE CAPSULE BY MOUTH EVERY DAY 30 MINUTES BEFORE A MEAL FOR STOMACH ACID 90 Cap 3    levETIRAcetam (KEPPRA) 250 mg tablet TAKE ONE TABLET BY MOUTH 2 TIMES A  Tab 3    metoprolol tartrate (LOPRESSOR) 50 mg tablet TAKE ONE TABLET BY MOUTH 2 TIMES A  Tab 3    vit C/E/zinc/lutein/zeaxanthin (OCUVITE EYE HEALTH PO) Take  by mouth two (2) times a day.  alendronate (FOSAMAX) 70 mg tablet Take 1 Tab by mouth every seven (7) days.  12 Tab 1    cholecalciferol, vitamin D3, (VITAMIN D3) 2,000 unit tab Take  by mouth.  albuterol (PROAIR HFA) 90 mcg/actuation inhaler Take 2 Puffs by inhalation every four (4) hours as needed for Wheezing or Shortness of Breath. Ventolin brand requested 1 Inhaler 6    ferrous sulfate 325 mg (65 mg iron) tablet Take 1 Tab by mouth every fourty-eight (48) hours. Indications: Iron Deficiency Anemia 100 Tab 6    wheat dextrin (BENEFIBER SUGAR FREE, DEXTRIN,) 3 gram/3.8 gram powd Take  by mouth daily.  acetaminophen (TYLENOL) 500 mg tablet Take 2 Tabs by mouth every six (6) hours. 100 Tab 0    aspirin 81 mg chewable tablet Take 1 Tab by mouth daily. 30 Tab 0    CALCIUM CARBONATE (OYSTER SHELL CALCIUM 500 PO) Take 1 Tab by mouth daily.  latanoprost (XALATAN) 0.005 % ophthalmic solution Administer 1 Drop to both eyes nightly.  timolol (TIMOPTIC) 0.5 % ophthalmic solution Administer 1 Drop to right eye two (2) times a day. No facility-administered medications prior to visit. Imaging:  MRI Results (most recent):  Results from Hospital Encounter encounter on 06/09/17   MRI [facility] SPINE WO CONT    Narrative EXAM:  MRI [facility] SPINE WO CONT    INDICATION:  T/L-spine trauma, significant injury suspected, +/- localizing  signs. Low back pain    COMPARISON: 11/15/2016    TECHNIQUE: MR imaging of the thoracic spine was performed using the following  sequences: sagittal T1, T2, stir; axial T1, T2.     CONTRAST: None. FINDINGS:    Mild mid thoracic kyphosis is noted. There is no significant subluxation. . There  is a severe, chronic compression fracture at T7. There is an acute, severe  inferior and superior endplate compression fracture at T12. There is only  minimal bulging of the posterior inferior margin of the fracture. No evidence of  extension into the pedicles or posterior elements is seen. . Mild osteophytic  bony endplate changes are noted at multiple levels. . No concerning signal  changes are seen in the visualized paraspinal soft tissues.     The course, caliber, and signal intensity of the spinal cord are normal.     A large hiatal hernia is redemonstrated. Multiple minimal disc bulges and/or protrusions are noted. The largest of these  are a left paracentral disc protrusion at T5-T6 and a right paracentral disc  protrusion at T4-T5. Both of these partially efface the lateral recesses. Minimal bulging of the posterior inferior margin of the T12 fracture causes no  significant narrowing of the canal. There is no significant spinal canal  stenosis throughout the thoracic spine. Impression IMPRESSION:    1. Acute, severe T12 compression fracture. Minimal bulging of the posterior  inferior margin of the fracture, however this does not cause significant spinal  canal stenosis. No evidence of extension of the fracture into the pedicles or  posterior elements. 2. Chronic T7 compression fracture. 3. Mild to mild/moderate thoracic spondylosis. 23X  829           CT Results (most recent):  Results from Hospital Encounter encounter on 12/07/18   CT HEAD WO CONT    Narrative INDICATION:  AMS, fall    Examination: Unenhanced head CT. Axial images were obtained from skull base to  the vertex. CT dose reduction was achieved through use of a standardized protocol tailored  for this examination and are automatic exposure control for dose modulation dose  reduction    COMPARISON: 11/15/2016    FINDINGS: The ventricles and cisterns are normal in size and configuration for  age. There are mild periventricular white matter changes. While nonspecific  these are likely due to small vessel ischemic change. There is no evidence of  midline shift, mass lesion or mass effect. No evidence of acute bleed or acute  infarct. No depressed skull fractures. Impression IMPRESSION:  1. Chronic appearing white matter changes.  No acute abnormality           Reviewed records in [employer] and media tab today    Lab Review   Results for orders placed or performed in visit on 02/20/19   CBC WITH AUTOMATED DIFF   Result Value Ref Range    WBC 11.1 (H) 3.4 - 10.8 x10E3/uL    RBC 4.81 3.77 - 5.28 x10E6/uL    HGB 14.4 11.1 - 15.9 g/dL    HCT 44.4 34.0 - 46.6 %    MCV 92 79 - 97 fL    MCH 29.9 26.6 - 33.0 pg    MCHC 32.4 31.5 - 35.7 g/dL    RDW 14.3 12.3 - 15.4 %    PLATELET 206 346 - 984 x10E3/uL    NEUTROPHILS 75 Not Estab. %    Lymphocytes 13 Not Estab. %    MONOCYTES 8 Not Estab. %    EOSINOPHILS 3 Not Estab. %    BASOPHILS 1 Not Estab. %    ABS. NEUTROPHILS 8.4 (H) 1.4 - 7.0 x10E3/uL    Abs Lymphocytes 1.4 0.7 - 3.1 x10E3/uL    ABS. MONOCYTES 0.9 0.1 - 0.9 x10E3/uL    ABS. EOSINOPHILS 0.3 0.0 - 0.4 x10E3/uL    ABS. BASOPHILS 0.1 0.0 - 0.2 x10E3/uL    IMMATURE GRANULOCYTES 0 Not Estab. %    ABS. IMM. GRANS. 0.0 0.0 - 0.1 x10E3/uL   LIPID PANEL   Result Value Ref Range    Cholesterol, total 201 (H) 100 - 199 mg/dL    Triglyceride 93 0 - 149 mg/dL    HDL Cholesterol 82 >39 mg/dL    VLDL, calculated 19 5 - 40 mg/dL    LDL, calculated 100 (H) 0 - 99 mg/dL   METABOLIC PANEL, COMPREHENSIVE   Result Value Ref Range    Glucose 87 65 - 99 mg/dL    BUN 12 8 - 27 mg/dL    Creatinine 0.93 0.57 - 1.00 mg/dL    GFR est non-AA 56 (L) >59 mL/min/1.73    GFR est AA 64 >59 mL/min/1.73    BUN/Creatinine ratio 13 12 - 28    Sodium 136 134 - 144 mmol/L    Potassium 4.9 3.5 - 5.2 mmol/L    Chloride 98 96 - 106 mmol/L    CO2 22 20 - 29 mmol/L    Calcium 9.6 8.7 - 10.3 mg/dL    Protein, total 7.1 6.0 - 8.5 g/dL    Albumin 4.5 3.5 - 4.7 g/dL    GLOBULIN, TOTAL 2.6 1.5 - 4.5 g/dL    A-G Ratio 1.7 1.2 - 2.2    Bilirubin, total 0.9 0.0 - 1.2 mg/dL    Alk.  phosphatase 70 39 - 117 IU/L    AST (SGOT) 17 0 - 40 IU/L    ALT (SGPT) 9 0 - 32 IU/L   CVD REPORT   Result Value Ref Range    INTERPRETATION Note     PDF IMAGE Not applicable    CKD REPORT   Result Value Ref Range    Interpretation Note         Exam:  Visit Vitals  /60   Pulse 60   Resp 16   Ht 4' 8.1\" (1.425 m)   Wt 57 kg (125 lb 9.6 oz)   SpO2 98%   BMI 28.06 kg/m²     General:  Alert, cooperative, no distress. Head:  Normocephalic, without obvious abnormality, atraumatic. Respiratory:  Heart:   Non labored breathing  Regular rate and rhythm, no murmurs   Neck:    Extremities: Warm, no cyanosis or edema. Pulses: 2+ radial pulses. Neurologic:  MS: Alert, speech intact. Language intact. Attention and fund of knowledge appropriate. Recent and remote memory intact.   Cranial Nerves:  II: visual fields    II: pupils    II: optic disc    III,VII: ptosis none   III,IV,VI: extraocular muscles  EOMI, no nystagmus or diplopia   V: facial light touch sensation     VII: facial muscle function   symmetric   VIII: hearing Diminished with hearing aids in place   IX: soft palate elevation     XI: trapezius strength     XI: sternocleidomastoid strength    XII: tongue       Motor:   Coordination:   Gait: Slow cautious gait without cane  Reflexes:     Mini Mental State Exam 7/31/2019 7/5/2017 1/5/2017   What is the Year 1 1 1   What is the Season 1 1 1   What is the Date 1 1 1   What is the Day 1 1 1   What is the Month 1 1 1   Where are we State 1 1 1   Where are we Country 1 1 1   Where are we [address] [place] [id number] or [facility] 1 1 1   Where are we Floor 1 1 1   Name three objects, then ask the patient to say them 3 3 3   Serial sevens Subtract 7 from 100 in increments 5 5 5   Ask for the three objects repeated above 3 3 3   Name a pencil 1 1 1   Name a watch 1 1 1   Have the patient repeat this phrase \"No ifs, ands, or buts\" 1 1 1   Three stage command: Take the paper in your right hand 1 1 1   Fold the paper in half 1 1 1   Put the paper on the floor 1 1 1   Read and obey the following: CLOSE YOUR EYES 1 1 1   Have the patient write a sentence 1 1 1   Have the patient copy a figure 1 1 1   Mini Mental Score 30 30 30          Assessment/Plan   Pt is an 80 y.o. RH female with probable epilepsy, hospitalized at [facility] 11/15-19/16 for episode of LOC at home resulting in a compression fracture at T7 and tongue trauma, with 2 prior episodes of unexplained LOC, the last in April, 2016 and associated with tongue trauma at that time as well. Additionally, MRI of the brain 11/15/16 with tiny acute infarct in the right deep white matter. EEG 11/15/16 was normal. Started on Keppra 250mg bid empirically for seizures without episode of LOC since and no side effects to med. Today mentions dreaming that she saw her \"\" who is actually her ex- by 30 years, on the floor of her room and called security before realizing that this was not real.  Exam with MMSE 30/30, o/w non-focal. Spell sounds most c/w hypnagogic hallucination, which occurring in isolation only one time is not a concern. No history suggestive of RBD. She will monitor and let me know if this continues to occur. Continue Keppra 250mg bid. I reassured the pt that I do not see signs of dementia. Follow-up in clinic in 6 months, instructed to call in the interim with any questions or concerns. ICD-10-CM ICD-9-CM    1. Seizures ([facility] [address] R56.9 780.39        Signed:   Elkin Rust MD  7/31/2019

## 2019-09-03 DIAGNOSIS — M80.00XD OSTEOPOROSIS WITH CURRENT PATHOLOGICAL FRACTURE WITH ROUTINE HEALING, UNSPECIFIED OSTEOPOROSIS TYPE, SUBSEQUENT ENCOUNTER: ICD-10-CM

## 2019-09-03 RX ORDER — METOPROLOL TARTRATE 50 MG/1
TABLET ORAL
Qty: 180 TAB | Refills: 3 | Status: SHIPPED | OUTPATIENT
Start: 2019-09-03 | End: 2020-08-24

## 2019-09-03 RX ORDER — LEVETIRACETAM 250 MG/1
TABLET ORAL
Qty: 180 TAB | Refills: 3 | Status: SHIPPED | OUTPATIENT
Start: 2019-09-03 | End: 2020-08-26

## 2019-09-03 RX ORDER — ALENDRONATE SODIUM 70 MG/1
TABLET ORAL
Qty: 12 TAB | Refills: 3 | Status: SHIPPED | OUTPATIENT
Start: 2019-09-03 | End: 2019-12-04 | Stop reason: SDUPTHER

## 2019-09-15 RX ORDER — AMLODIPINE BESYLATE 5 MG/1
TABLET ORAL
Qty: 90 TAB | Refills: 4 | Status: SHIPPED | OUTPATIENT
Start: 2019-09-15 | End: 2020-12-20

## 2019-11-04 ENCOUNTER — OFFICE VISIT (OUTPATIENT)
Dept: CARDIOLOGY CLINIC | Age: 84
End: 2019-11-04

## 2019-11-04 DIAGNOSIS — Z95.0 CARDIAC PACEMAKER IN SITU: Primary | ICD-10-CM

## 2019-11-29 ENCOUNTER — HOSPITAL ENCOUNTER (OUTPATIENT)
Dept: LAB | Age: 84
Discharge: HOME | End: 2019-11-29
Payer: MEDICARE

## 2019-11-29 PROCEDURE — 80053 COMPREHEN METABOLIC PANEL: CPT

## 2019-11-29 PROCEDURE — 85025 COMPLETE CBC W/AUTO DIFF WBC: CPT

## 2019-11-29 PROCEDURE — 80061 LIPID PANEL: CPT

## 2019-12-02 LAB
ALBUMIN SERPL-MCNC: 4.3 G/DL (ref 3.5–4.7)
ALBUMIN/GLOB SERPL: 1.8 {RATIO} (ref 1.2–2.2)
ALP SERPL-CCNC: 78 IU/L (ref 39–117)
ALT SERPL-CCNC: 11 IU/L (ref 0–32)
AST SERPL-CCNC: 18 IU/L (ref 0–40)
BASOPHILS # BLD AUTO: 0.1 X10E3/UL (ref 0–0.2)
BASOPHILS NFR BLD AUTO: 1 %
BILIRUB SERPL-MCNC: 0.5 MG/DL (ref 0–1.2)
BUN SERPL-MCNC: 12 MG/DL (ref 8–27)
BUN/CREAT SERPL: 14 (ref 12–28)
CALCIUM SERPL-MCNC: 9.4 MG/DL (ref 8.7–10.3)
CHLORIDE SERPL-SCNC: 98 MMOL/L (ref 96–106)
CHOLEST SERPL-MCNC: 187 MG/DL (ref 100–199)
CO2 SERPL-SCNC: 22 MMOL/L (ref 20–29)
CREAT SERPL-MCNC: 0.86 MG/DL (ref 0.57–1)
EOSINOPHIL # BLD AUTO: 0.4 X10E3/UL (ref 0–0.4)
EOSINOPHIL NFR BLD AUTO: 5 %
ERYTHROCYTE [DISTWIDTH] IN BLOOD BY AUTOMATED COUNT: 13.1 % (ref 12.3–15.4)
GLOBULIN SER CALC-MCNC: 2.4 G/DL (ref 1.5–4.5)
GLUCOSE SERPL-MCNC: 78 MG/DL (ref 65–99)
HCT VFR BLD AUTO: 40.5 % (ref 34–46.6)
HDLC SERPL-MCNC: 84 MG/DL
HGB BLD-MCNC: 14.1 G/DL (ref 11.1–15.9)
IMM GRANULOCYTES # BLD AUTO: 0 X10E3/UL (ref 0–0.1)
IMM GRANULOCYTES NFR BLD AUTO: 0 %
INTERPRETATION, 910389: NORMAL
LDLC SERPL CALC-MCNC: 81 MG/DL (ref 0–99)
LYMPHOCYTES # BLD AUTO: 1.2 X10E3/UL (ref 0.7–3.1)
LYMPHOCYTES NFR BLD AUTO: 15 %
MCH RBC QN AUTO: 31.3 PG (ref 26.6–33)
MCHC RBC AUTO-ENTMCNC: 34.8 G/DL (ref 31.5–35.7)
MCV RBC AUTO: 90 FL (ref 79–97)
MONOCYTES # BLD AUTO: 0.6 X10E3/UL (ref 0.1–0.9)
MONOCYTES NFR BLD AUTO: 7 %
NEUTROPHILS # BLD AUTO: 5.9 X10E3/UL (ref 1.4–7)
NEUTROPHILS NFR BLD AUTO: 72 %
PLATELET # BLD AUTO: 237 X10E3/UL (ref 150–450)
POTASSIUM SERPL-SCNC: 4.2 MMOL/L (ref 3.5–5.2)
PROT SERPL-MCNC: 6.7 G/DL (ref 6–8.5)
RBC # BLD AUTO: 4.5 X10E6/UL (ref 3.77–5.28)
SODIUM SERPL-SCNC: 136 MMOL/L (ref 134–144)
TRIGL SERPL-MCNC: 110 MG/DL (ref 0–149)
VLDLC SERPL CALC-MCNC: 22 MG/DL (ref 5–40)
WBC # BLD AUTO: 8.3 X10E3/UL (ref 3.4–10.8)

## 2019-12-04 ENCOUNTER — OFFICE VISIT (OUTPATIENT)
Dept: INTERNAL MEDICINE CLINIC | Age: 84
End: 2019-12-04

## 2019-12-04 VITALS
TEMPERATURE: 96.9 F | HEIGHT: 56 IN | DIASTOLIC BLOOD PRESSURE: 81 MMHG | WEIGHT: 123 LBS | HEART RATE: 88 BPM | OXYGEN SATURATION: 98 % | BODY MASS INDEX: 27.67 KG/M2 | RESPIRATION RATE: 18 BRPM | SYSTOLIC BLOOD PRESSURE: 151 MMHG

## 2019-12-04 DIAGNOSIS — D50.0 IRON DEFICIENCY ANEMIA DUE TO CHRONIC BLOOD LOSS: ICD-10-CM

## 2019-12-04 DIAGNOSIS — I48.0 PAF (PAROXYSMAL ATRIAL FIBRILLATION) (HCC): ICD-10-CM

## 2019-12-04 DIAGNOSIS — I51.89 DIASTOLIC DYSFUNCTION: ICD-10-CM

## 2019-12-04 DIAGNOSIS — I63.9 CEREBROVASCULAR ACCIDENT (CVA), UNSPECIFIED MECHANISM (HCC): ICD-10-CM

## 2019-12-04 DIAGNOSIS — J43.1 PANLOBULAR EMPHYSEMA (HCC): ICD-10-CM

## 2019-12-04 DIAGNOSIS — I10 HTN (HYPERTENSION), BENIGN: Primary | ICD-10-CM

## 2019-12-04 DIAGNOSIS — Z00.00 MEDICARE ANNUAL WELLNESS VISIT, SUBSEQUENT: ICD-10-CM

## 2019-12-04 NOTE — PROGRESS NOTES
1. Have you been to the ER, urgent care clinic since your last visit? Hospitalized since your last visit? No    2. Have you seen or consulted any other health care providers outside of the [address] [place] [address] since your last visit? Include any pap smears or colon screening.  No

## 2019-12-04 NOTE — PATIENT INSTRUCTIONS
Medicare Wellness Visit, Female The best way to live healthy is to have a lifestyle where you eat a well-balanced diet, exercise regularly, limit alcohol use, and quit all forms of tobacco/nicotine, if applicable. Regular preventive services are another way to keep healthy. Preventive services (vaccines, screening tests, monitoring & exams) can help personalize your care plan, which helps you manage your own care. Screening tests can find health problems at the earliest stages, when they are easiest to treat. [facility] follows the current, evidence-based guidelines published by the [facility] Jayme Schmid ([facility]) when recommending preventive services for our patients. Because we follow these guidelines, sometimes recommendations change over time as research supports it. (For example, mammograms used to be recommended annually. Even though Medicare will still pay for an annual mammogram, the newer guidelines recommend a mammogram every two years for women of average risk). Of course, you and your doctor may decide to screen more often for some diseases, based on your risk and your co-morbidities (chronic disease you are already diagnosed with). Preventive services for you include: - Medicare offers their members a free annual wellness visit, which is time for you and your primary care provider to discuss and plan for your preventive service needs. Take advantage of this benefit every year! 
-All adults over the age of 72 should receive the recommended pneumonia vaccines. Current USPSTF guidelines recommend a series of two vaccines for the best pneumonia protection.  
-All adults should have a flu vaccine yearly and a tetanus vaccine every 10 years.  
-All adults age 48 and older should receive the shingles vaccines (series of two vaccines). -All adults age 38-68 who are overweight should have a diabetes screening test once every three years. -All adults born between [birth year] and 1965 should be screened once for Hepatitis C. 
-Other screening tests and preventive services for persons with diabetes include: an eye exam to screen for diabetic retinopathy, a kidney function test, a foot exam, and stricter control over your cholesterol.  
-Cardiovascular screening for adults with routine risk involves an electrocardiogram (ECG) at intervals determined by your doctor.  
-Colorectal cancer screenings should be done for adults age 54-65 with no increased risk factors for colorectal cancer. There are a number of acceptable methods of screening for this type of cancer. Each test has its own benefits and drawbacks. Discuss with your doctor what is most appropriate for you during your annual wellness visit. The different tests include: colonoscopy (considered the best screening method), a fecal occult blood test, a fecal DNA test, and sigmoidoscopy. 
 
-A bone mass density test is recommended when a woman turns 65 to screen for osteoporosis. This test is only recommended one time, as a screening. Some providers will use this same test as a disease monitoring tool if you already have osteoporosis. -Breast cancer screenings are recommended every other year for women of normal risk, age 54-69. 
-Cervical cancer screenings for women over age 72 are only recommended with certain risk factors. Here is a list of your current Health Maintenance items (your personalized list of preventive services) with a due date: 
Health Maintenance Due Topic Date Due  Shingles Vaccine (1 of 2) 05/23/1983  Flu Vaccine  08/01/2019 Heath Guerrero Annual Well Visit  08/09/2019 Medicare Wellness Visit, Female The best way to live healthy is to have a lifestyle where you eat a well-balanced diet, exercise regularly, limit alcohol use, and quit all forms of tobacco/nicotine, if applicable. Regular preventive services are another way to keep healthy.  Preventive services (vaccines, screening tests, monitoring & exams) can help personalize your care plan, which helps you manage your own care. Screening tests can find health problems at the earliest stages, when they are easiest to treat. [facility] follows the current, evidence-based guidelines published by the [facility] Jayme Schmid ([facility]) when recommending preventive services for our patients. Because we follow these guidelines, sometimes recommendations change over time as research supports it. (For example, mammograms used to be recommended annually. Even though Medicare will still pay for an annual mammogram, the newer guidelines recommend a mammogram every two years for women of average risk). Of course, you and your doctor may decide to screen more often for some diseases, based on your risk and your co-morbidities (chronic disease you are already diagnosed with). Preventive services for you include: - Medicare offers their members a free annual wellness visit, which is time for you and your primary care provider to discuss and plan for your preventive service needs. Take advantage of this benefit every year! 
-All adults over the age of 72 should receive the recommended pneumonia vaccines. Current USPSTF guidelines recommend a series of two vaccines for the best pneumonia protection.  
-All adults should have a flu vaccine yearly and a tetanus vaccine every 10 years.  
-All adults age 48 and older should receive the shingles vaccines (series of two vaccines). -All adults age 38-68 who are overweight should have a diabetes screening test once every three years.  
-All adults born between [birth year] and 1965 should be screened once for Hepatitis C. 
-Other screening tests and preventive services for persons with diabetes include: an eye exam to screen for diabetic retinopathy, a kidney function test, a foot exam, and stricter control over your cholesterol. -Cardiovascular screening for adults with routine risk involves an electrocardiogram (ECG) at intervals determined by your doctor.  
-Colorectal cancer screenings should be done for adults age 54-65 with no increased risk factors for colorectal cancer. There are a number of acceptable methods of screening for this type of cancer. Each test has its own benefits and drawbacks. Discuss with your doctor what is most appropriate for you during your annual wellness visit. The different tests include: colonoscopy (considered the best screening method), a fecal occult blood test, a fecal DNA test, and sigmoidoscopy. 
 
-A bone mass density test is recommended when a woman turns 65 to screen for osteoporosis. This test is only recommended one time, as a screening. Some providers will use this same test as a disease monitoring tool if you already have osteoporosis. -Breast cancer screenings are recommended every other year for women of normal risk, age 54-69. 
-Cervical cancer screenings for women over age 72 are only recommended with certain risk factors. Here is a list of your current Health Maintenance items (your personalized list of preventive services) with a due date: 
Health Maintenance Due Topic Date Due  Shingles Vaccine (1 of 2) 05/23/1983  Flu Vaccine  08/01/2019 Winnie Mullen Annual Well Visit  08/09/2019 Medicare Wellness Visit, Female The best way to live healthy is to have a lifestyle where you eat a well-balanced diet, exercise regularly, limit alcohol use, and quit all forms of tobacco/nicotine, if applicable. Regular preventive services are another way to keep healthy. Preventive services (vaccines, screening tests, monitoring & exams) can help personalize your care plan, which helps you manage your own care. Screening tests can find health problems at the earliest stages, when they are easiest to treat.   
[facility] follows the current, evidence-based guidelines published by the [facility] (USPSTF) when recommending preventive services for our patients. Because we follow these guidelines, sometimes recommendations change over time as research supports it. (For example, mammograms used to be recommended annually. Even though Medicare will still pay for an annual mammogram, the newer guidelines recommend a mammogram every two years for women of average risk). Of course, you and your doctor may decide to screen more often for some diseases, based on your risk and your co-morbidities (chronic disease you are already diagnosed with). Preventive services for you include: - Medicare offers their members a free annual wellness visit, which is time for you and your primary care provider to discuss and plan for your preventive service needs. Take advantage of this benefit every year! 
-All adults over the age of 72 should receive the recommended pneumonia vaccines. Current USPSTF guidelines recommend a series of two vaccines for the best pneumonia protection.  
-All adults should have a flu vaccine yearly and a tetanus vaccine every 10 years.  
-All adults age 48 and older should receive the shingles vaccines (series of two vaccines). -All adults age 38-68 who are overweight should have a diabetes screening test once every three years.  
-All adults born between [birth year] and 1965 should be screened once for Hepatitis C. 
-Other screening tests and preventive services for persons with diabetes include: an eye exam to screen for diabetic retinopathy, a kidney function test, a foot exam, and stricter control over your cholesterol.  
-Cardiovascular screening for adults with routine risk involves an electrocardiogram (ECG) at intervals determined by your doctor.  
-Colorectal cancer screenings should be done for adults age 54-65 with no increased risk factors for colorectal cancer.   There are a number of acceptable methods of screening for this type of cancer. Each test has its own benefits and drawbacks. Discuss with your doctor what is most appropriate for you during your annual wellness visit. The different tests include: colonoscopy (considered the best screening method), a fecal occult blood test, a fecal DNA test, and sigmoidoscopy. 
 
-A bone mass density test is recommended when a woman turns 65 to screen for osteoporosis. This test is only recommended one time, as a screening. Some providers will use this same test as a disease monitoring tool if you already have osteoporosis. -Breast cancer screenings are recommended every other year for women of normal risk, age 54-69. 
-Cervical cancer screenings for women over age 72 are only recommended with certain risk factors. Here is a list of your current Health Maintenance items (your personalized list of preventive services) with a due date: 
Health Maintenance Due Topic Date Due  Shingles Vaccine (1 of 2) 05/23/1983  Flu Vaccine  08/01/2019

## 2019-12-04 NOTE — PROGRESS NOTES
This is the Subsequent Medicare Annual Wellness Exam, performed 12 months or more after the Initial AWV or the last Subsequent AWV    I have reviewed the patient's medical history in detail and updated the computerized patient record. History     Patient Active Problem List   Diagnosis Code    Osteoporosis M81.0    Hypercholesteremia E78.00    Iron deficiency anemia D50.9    Khadar lesion, chronic K25.7    HEART (dyspnea on exertion) R06.09    COPD (chronic obstructive pulmonary disease) ([facility]) J44.9    Syncope and collapse H62    Diastolic dysfunction Y84.85    HTN (hypertension), benign I10    Stroke ([address] I63.9    Cerebrovascular accident (CVA) ([address] I63.9    Pacemaker Z95.0    Sick sinus syndrome due to SA node dysfunction ([facility]) I49.5    PAF (paroxysmal atrial fibrillation) ([facility]) I48.0     Past Medical History:   Diagnosis Date    Anemia     Arthritis     Khadar lesion, chronic 5/8/2013    Seen on EGD 5/7/13    Chronic obstructive pulmonary disease ([address]     CVA (cerebral vascular accident) ([address]     Tiny punctate infarct right frontal lobe, 10/2016.     HEART (dyspnea on exertion) 6/12/2015    GERD (gastroesophageal reflux disease)     Hypercholesterolemia     Hypertension 9/22/2010    Ill-defined condition     Osteoporosis 9/22/2010    Seizures ([address]     Sick sinus syndrome due to SA node dysfunction ([address] 12/17/2018      Past Surgical History:   Procedure Laterality Date    CARDIAC SURG PROCEDURE UNLIST  2008    nuclear stress test normal     CARDIAC SURG PROCEDURE UNLIST  2018    pacemaker    ENDOSCOPY, COLON, DIAGNOSTIC  2008, 2013    HX CATARACT REMOVAL      HX GI  2013    egd   khadar ulcer    HX ORTHOPAEDIC  2010    knee replacement    IMPLANT  LOOP RECORDER  11/17/2016         [place] INS NEW/RPLCMT PRM PM W/TRANSV ELTRD ATRIAL&VENT  12/17/2018         [place] RMVL IMPLANTABLE PT-ACTIVATED CAR EVENT RECORDER  12/17/2018          Current Outpatient Medications   Medication Sig Dispense Refill    amLODIPine (NORVASC) 5 mg tablet TAKE 1 TABLET BY MOUTH EVERY DAY 90 Tab 4    levETIRAcetam (KEPPRA) 250 mg tablet TAKE ONE TABLET BY MOUTH 2 TIMES A  Tab 3    metoprolol tartrate (LOPRESSOR) 50 mg tablet TAKE ONE TABLET BY MOUTH 2 TIMES A  Tab 3    PROAIR HFA 90 mcg/actuation inhaler INHALE 2 PUFFS BY MOUTH EVERY 4 HOURS AS NEEDED FOR WHEEZING OR SHORTNESS OF BREATH 1 Inhaler 2    atorvastatin (LIPITOR) 20 mg tablet TAKE ONE TABLET BY MOUTH EVERY DAY 90 Tab 3    lisinopril (PRINIVIL, ZESTRIL) 5 mg tablet TAKE ONE TABLET BY MOUTH EVERY DAY 90 Tab 3    omeprazole (PRILOSEC) 20 mg capsule TAKE ONE CAPSULE BY MOUTH EVERY DAY 30 MINUTES BEFORE A MEAL FOR STOMACH ACID 90 Cap 3    vit C/E/zinc/lutein/zeaxanthin (OCUVITE EYE HEALTH PO) Take  by mouth two (2) times a day.  alendronate (FOSAMAX) 70 mg tablet Take 1 Tab by mouth every seven (7) days. 12 Tab 1    cholecalciferol, vitamin D3, (VITAMIN D3) 2,000 unit tab Take  by mouth.  albuterol (PROAIR HFA) 90 mcg/actuation inhaler Take 2 Puffs by inhalation every four (4) hours as needed for Wheezing or Shortness of Breath. Ventolin brand requested 1 Inhaler 6    ferrous sulfate 325 mg (65 mg iron) tablet Take 1 Tab by mouth every fourty-eight (48) hours. Indications: Iron Deficiency Anemia 100 Tab 6    wheat dextrin (BENEFIBER SUGAR FREE, DEXTRIN,) 3 gram/3.8 gram powd Take  by mouth daily.  acetaminophen (TYLENOL) 500 mg tablet Take 2 Tabs by mouth every six (6) hours. 100 Tab 0    aspirin 81 mg chewable tablet Take 1 Tab by mouth daily. 30 Tab 0    CALCIUM CARBONATE (OYSTER SHELL CALCIUM 500 PO) Take 1 Tab by mouth daily.  latanoprost (XALATAN) 0.005 % ophthalmic solution Administer 1 Drop to both eyes nightly.  timolol (TIMOPTIC) 0.5 % ophthalmic solution Administer 1 Drop to right eye two (2) times a day.        No Known Allergies    Family History   Problem Relation Age of Onset    Heart Disease Mother     Heart Disease Father     Heart Disease Sister     Alzheimer Sister     Alcohol abuse Brother      Social History     Tobacco Use    Smoking status: Former Smoker     Years: 30.00     Types: Cigarettes     Last attempt to quit: 2000     Years since quittin.2    Smokeless tobacco: Never Used   Substance Use Topics    Alcohol use: Yes     Alcohol/week: 6.0 standard drinks     Types: 6 Glasses of wine per week       Depression Risk Factor Screening:     3 most recent PHQ Screens 2019   Little interest or pleasure in doing things Not at all   Feeling down, depressed, irritable, or hopeless Not at all   Total Score PHQ 2 0       Alcohol Risk Factor Screening:   Do you average 1 drink per night or more than 7 drinks a week: On any one occasion in the past three months have you have had more than 3 drinks containing alcohol:        Functional Ability and Level of Safety:   Hearing: The patient wears hearing aids. Activities of Daily Living: The home contains: handrails, grab bars and walker  Patient does total self care    Ambulation: with difficulty, uses a cane and walker    Fall Risk:  Fall Risk Assessment, last 12 mths 2019   Able to walk? Yes   Fall in past 12 months?  No   Fall with injury? -   Number of falls in past 12 months -   Fall Risk Score -       Abuse Screen:  Patient is not abused    Cognitive Screening   Has your family/caregiver stated any concerns about your memory: no  Cognitive Screening: Normal - Verbal Fluency Test    Patient Care Team   Patient Care Team:  Jed Gray MD as PCP - General  Jed Gray MD as PCP - [facility]  Santos Butterfield MD (Cardiology)  Ling Tilley MD as Physician (Neurology)    Assessment/Plan   Education and counseling provided:  Are appropriate based on today's review and evaluation  End-of-Life planning (with patient's consent)    Chief Complaint   Patient presents with   [facility] COPD    Cerebrovascular Accident     hx    Hypertension    Irregular Heart Beat    Osteoporosis    Cholesterol Problem     Chief Complaint   Patient presents with    COPD    Cerebrovascular Accident     hx    Hypertension    Irregular Heart Beat    Osteoporosis    Cholesterol Problem     Subjective:    Darien Quick is a 80 y.o. RH femal        Patient Active Problem List    Diagnosis    Pacemaker     Dual chamber pacer [employer] 12/17/2018      Sick sinus syndrome due to SA node dysfunction ([facility])     6 second pause with syncope noted on ILR       PAF (paroxysmal atrial fibrillation) ([address]    Cerebrovascular accident (CVA) ([address]    Stroke ([address]    HTN (hypertension), benign    Diastolic dysfunction    Syncope and collapse    HEART (dyspnea on exertion)    COPD (chronic obstructive pulmonary disease) ([address]    Khadar lesion, chronic     Seen on EGD 5/7/13      Iron deficiency anemia    Osteoporosis     Fosamax  2005  No fractures  In all these years    Restarted 2016      Hypercholesteremia     Past Surgical History:   Procedure Laterality Date    CARDIAC SURG PROCEDURE UNLIST  2008    nuclear stress test normal     CARDIAC SURG PROCEDURE UNLIST  2018    pacemaker    ENDOSCOPY, COLON, DIAGNOSTIC  2008, 2013    HX CATARACT REMOVAL      HX GI  2013    egd   khadar ulcer    HX ORTHOPAEDIC  2010    knee replacement    IMPLANT  LOOP RECORDER  11/17/2016         [place] INS NEW/RPLCMT PRM PM W/TRANSV ELTRD ATRIAL&VENT  12/17/2018         [place] RMVL IMPLANTABLE PT-ACTIVATED CAR EVENT RECORDER  12/17/2018            Denies gerd    Vitals:    12/04/19 1544   BP: 151/81   Pulse: 88   Resp: 18   Temp: 96.9 °F (36.1 °C)   TempSrc: Oral   SpO2: 98%   Weight: 123 lb (55.8 kg)   Height: 4' 8.1\" (1.425 m)     Vitals:    12/04/19 1544   BP: 151/81   Pulse: 88   Resp: 18   Temp: 96.9 °F (36.1 °C)   TempSrc: Oral   SpO2: 98%   Weight: 123 lb (55.8 kg)   Height: 4' 8.1\" (1.425 m)       S1 and S2 normal, no murmurs, clicks, gallops or rubs. Regular rate and rhythm. Chest is clear; no wheezes or rales. No edema or JVD. Neuro intact  Uses a cane for ambulation support and balance balance has improved           hospitalized at [facility] 11/15-19/16 for episode of LOC at home resulting in a compression fracture at T7 and tongue trauma, with 2 prior episodes of unexplained LOC, the last in April and associated with tongue trauma at that time as well. Additionally, MRI of the brain with tiny acute infarct in the right deep white matter. Exam revealed upgoing toe on the left, otherwise unremarkable. EEG was normal. Episodes of LOC with tongue trauma were concerning for seizure. No known seizure risk factors other than age. Family denied concerns about dementia. Recommended starting Keppra 250 mg po bid given high risk for injury with subsequent seizures. The stroke seen on MRI would not have been responsible for LOC or seizure. It is hard to know if the two are related. She was started on aspirin 81 mg a day and recommended continued good control of her stroke risk factors. She was also seen by Cardiology and discharged with a loop recorder. (negative)  was in rehab at [address] [place] [address] until 12/1/16. She has not had any subsequent episodes of LOC or unusual spells. She does not remember anything from her hospital stay and is repeating herself more. She is able to take her meds correctly everyday and is remembering her family and friends appropriately. Lives independently in a half-way community.  Cooks for herself  Follow up doing well now wioth no pain she is tolerating the fosamax  So far     Loose bowels better with fib  Past Surgical History:   Procedure Laterality Date    CARDIAC SURG PROCEDURE UNLIST  2008    nuclear stress test normal     CARDIAC SURG PROCEDURE UNLIST  2018    pacemaker    ENDOSCOPY, COLON, DIAGNOSTIC  2008, 2013    HX CATARACT REMOVAL      HX GI  2013    egd   kia ulcer    HX ORTHOPAEDIC  2010    knee replacement  IMPLANT  LOOP RECORDER  11/17/2016         [place] INS NEW/RPLCMT PRM PM W/TRANSV ELTRD ATRIAL&VENT  12/17/2018         [place] RMVL IMPLANTABLE PT-ACTIVATED CAR EVENT RECORDER  12/17/2018          Current Outpatient Medications   Medication Sig Dispense Refill    amLODIPine (NORVASC) 5 mg tablet TAKE 1 TABLET BY MOUTH EVERY DAY 90 Tab 4    levETIRAcetam (KEPPRA) 250 mg tablet TAKE ONE TABLET BY MOUTH 2 TIMES A  Tab 3    metoprolol tartrate (LOPRESSOR) 50 mg tablet TAKE ONE TABLET BY MOUTH 2 TIMES A  Tab 3    PROAIR HFA 90 mcg/actuation inhaler INHALE 2 PUFFS BY MOUTH EVERY 4 HOURS AS NEEDED FOR WHEEZING OR SHORTNESS OF BREATH 1 Inhaler 2    atorvastatin (LIPITOR) 20 mg tablet TAKE ONE TABLET BY MOUTH EVERY DAY 90 Tab 3    lisinopril (PRINIVIL, ZESTRIL) 5 mg tablet TAKE ONE TABLET BY MOUTH EVERY DAY 90 Tab 3    omeprazole (PRILOSEC) 20 mg capsule TAKE ONE CAPSULE BY MOUTH EVERY DAY 30 MINUTES BEFORE A MEAL FOR STOMACH ACID 90 Cap 3    vit C/E/zinc/lutein/zeaxanthin (OCUVITE EYE HEALTH PO) Take  by mouth two (2) times a day.  alendronate (FOSAMAX) 70 mg tablet Take 1 Tab by mouth every seven (7) days. 12 Tab 1    cholecalciferol, vitamin D3, (VITAMIN D3) 2,000 unit tab Take  by mouth.  albuterol (PROAIR HFA) 90 mcg/actuation inhaler Take 2 Puffs by inhalation every four (4) hours as needed for Wheezing or Shortness of Breath. Ventolin brand requested 1 Inhaler 6    ferrous sulfate 325 mg (65 mg iron) tablet Take 1 Tab by mouth every fourty-eight (48) hours. Indications: Iron Deficiency Anemia 100 Tab 6    wheat dextrin (BENEFIBER SUGAR FREE, DEXTRIN,) 3 gram/3.8 gram powd Take  by mouth daily.  acetaminophen (TYLENOL) 500 mg tablet Take 2 Tabs by mouth every six (6) hours. 100 Tab 0    aspirin 81 mg chewable tablet Take 1 Tab by mouth daily. 30 Tab 0    CALCIUM CARBONATE (OYSTER SHELL CALCIUM 500 PO) Take 1 Tab by mouth daily.       latanoprost (XALATAN) 0.005 % ophthalmic solution Administer 1 Drop to both eyes nightly.  timolol (TIMOPTIC) 0.5 % ophthalmic solution Administer 1 Drop to right eye two (2) times a day. No Known Allergies  Family History   Problem Relation Age of Onset    Heart Disease Mother     Heart Disease Father     Heart Disease Sister     Alzheimer Sister     Alcohol abuse Brother      Social History     Tobacco Use    Smoking status: Former Smoker     Years: 30.00     Types: Cigarettes     Last attempt to quit: 2000     Years since quittin.2    Smokeless tobacco: Never Used   Substance Use Topics    Alcohol use: Yes     Alcohol/week: 6.0 standard drinks     Types: 6 Glasses of wine per week     Patient Active Problem List   Diagnosis Code    Osteoporosis M81.0    Hypercholesteremia E78.00    Iron deficiency anemia D50.9    Khadar lesion, chronic K25.7    HEART (dyspnea on exertion) R06.09    [facility] J44.9    Syncope and collapse X09    Diastolic dysfunction T35.85    HTN (hypertension), benign I10    Stroke ([facility]) I63.9    Cerebrovascular accident (CVA) ([address] I63.9    Pacemaker Z95.0    Sick sinus syndrome due to SA node dysfunction ([facility]) I49.5    PAF (paroxysmal atrial fibrillation) ([facility]) I48.0       Alcohol Risk Factor Screening:         Functional Ability and Level of Safety:     Hearing Loss   mild    Activities of Daily Living   Self-care. Requires assistance with: no ADLs    Fall Risk     Fall Risk Assessment, last 12 mths 2019   Able to walk? Yes   Fall in past 12 months? No   Fall with injury? -   Number of falls in past 12 months -   Fall Risk Score -     Abuse Screen   Patient is not abused    Review of Systems   Pertinent items are noted in HPI.     Physical Examination     Evaluation of Cognitive Function:  Mood/affect:  happy  Appearance: age appropriate  Family member/caregiver input: family helps a lot  No longer driving episode left arm hand pain a few nights ago went away with rolling over      Visit Vitals  /81 (BP 1 Location: Right arm, BP Patient Position: Sitting)   Pulse 88   Temp 96.9 °F (36.1 °C) (Oral)   Resp 18   Ht 4' 8.1\" (1.425 m)   Wt 123 lb (55.8 kg)   SpO2 98%   BMI 27.48 kg/m²     General appearance: alert, cooperative, no distress, appears stated age  Lungs: clear to auscultation bilaterally  Heart: regular rate and rhythm, S1, S2 normal, no murmur, click, rub or gallop  Neurologic: Grossly normal  Saw neuro and note reviewed    Mobility better    . get up and go  quickly      Lab Results   Component Value Date/Time    WBC 8.3 11/29/2019 12:00 AM    HGB 14.1 11/29/2019 12:00 AM    Hemoglobin (POC) 11.6 07/10/2013 09:09 PM    HCT 40.5 11/29/2019 12:00 AM    Hematocrit (POC) 34 (L) 07/10/2013 09:09 PM    PLATELET 164 01/08/6097 12:00 AM    MCV 90 11/29/2019 12:00 AM     Lab Results   Component Value Date/Time    Cholesterol, total 187 11/29/2019 12:00 AM    HDL Cholesterol 84 11/29/2019 12:00 AM    LDL, calculated 81 11/29/2019 12:00 AM    Triglyceride 110 11/29/2019 12:00 AM    CHOL/HDL Ratio 1.9 11/16/2016 03:53 AM     Lab Results   Component Value Date/Time    ALT (SGPT) 11 11/29/2019 12:00 AM    AST (SGOT) 18 11/29/2019 12:00 AM    Alk.  phosphatase 78 11/29/2019 12:00 AM    Bilirubin, direct 0.1 01/06/2010 09:58 AM    Bilirubin, total 0.5 11/29/2019 12:00 AM    Albumin 4.3 11/29/2019 12:00 AM    Protein, total 6.7 11/29/2019 12:00 AM    INR 1.0 11/15/2016 10:10 AM    Prothrombin time 10.3 11/15/2016 10:10 AM    PLATELET 076 38/87/7069 12:00 AM       Lab Results   Component Value Date/Time    GFR est non-AA 61 11/29/2019 12:00 AM    GFRNA, POC 39 (L) 07/10/2013 09:09 PM    GFR est AA 71 11/29/2019 12:00 AM    GFRAA, POC 48 (L) 07/10/2013 09:09 PM    Creatinine 0.86 11/29/2019 12:00 AM    Creatinine (POC) 1.3 07/10/2013 09:09 PM    BUN 12 11/29/2019 12:00 AM    BUN (POC) 23 (H) 07/10/2013 09:09 PM    Sodium 136 11/29/2019 12:00 AM    Sodium (POC) 137 07/10/2013 09:09 PM    Potassium 4.2 11/29/2019 12:00 AM    Potassium (POC) 3.8 07/10/2013 09:09 PM    Chloride 98 11/29/2019 12:00 AM    Chloride (POC) 104 07/10/2013 09:09 PM    CO2 22 11/29/2019 12:00 AM    Magnesium 1.8 11/27/2016 04:20 AM    Phosphorus 4.2 11/27/2016 04:20 AM     Lab Results   Component Value Date/Time    Iron 39 02/06/2019 02:57 PM    TIBC 303 02/06/2019 02:57 PM    Iron % saturation 13 (L) 02/06/2019 02:57 PM    Ferritin 120 07/05/2017 01:39 PM   1. Iron deficiency anemia due to chronic blood loss  On iron stable    2. HTN (hypertension), benign  controlled  - CBC WITH AUTOMATED DIFF  - LIPID PANEL  - METABOLIC PANEL, COMPREHENSIVE    3. Osteoporosis with current pathological fracture with routine healing, unspecified osteoporosis type, subsequent encounter  On therapy    4. Cerebrovascular accident (CVA), unspecified mechanism ([address]  stable    5.  History of stroke  2 years ago    Symptoms from this week discussed        Health Maintenance Due   Topic Date Due    Shingrix Vaccine Age 49> (1 of 2) 05/23/1983    Influenza Age 5 to Adult  08/01/2019    MEDICARE YEARLY EXAM  08/09/2019

## 2019-12-09 RX ORDER — LISINOPRIL 5 MG/1
TABLET ORAL
Qty: 90 TAB | Refills: 14 | Status: SHIPPED | OUTPATIENT
Start: 2019-12-09 | End: 2020-12-20

## 2019-12-20 DIAGNOSIS — I10 HTN (HYPERTENSION), BENIGN: ICD-10-CM

## 2019-12-20 DIAGNOSIS — I63.9 CEREBROVASCULAR ACCIDENT (CVA), UNSPECIFIED MECHANISM (HCC): ICD-10-CM

## 2019-12-20 DIAGNOSIS — D50.0 IRON DEFICIENCY ANEMIA DUE TO CHRONIC BLOOD LOSS: ICD-10-CM

## 2019-12-20 DIAGNOSIS — E78.00 HYPERCHOLESTEREMIA: ICD-10-CM

## 2019-12-21 RX ORDER — OMEPRAZOLE 20 MG/1
CAPSULE, DELAYED RELEASE ORAL
Qty: 90 CAP | Refills: 10 | Status: SHIPPED | OUTPATIENT
Start: 2019-12-21 | End: 2021-03-20

## 2020-02-10 ENCOUNTER — OFFICE VISIT (OUTPATIENT)
Dept: CARDIOLOGY CLINIC | Age: 85
End: 2020-02-10

## 2020-02-10 DIAGNOSIS — Z95.0 CARDIAC PACEMAKER IN SITU: Primary | ICD-10-CM

## 2020-05-01 ENCOUNTER — TELEPHONE (OUTPATIENT)
Dept: CARDIOLOGY CLINIC | Age: 85
End: 2020-05-01

## 2020-05-01 NOTE — TELEPHONE ENCOUNTER
Patient is calling to reschedule her upcoming pacemaker check and follow up with Dr. Karthik Roman. Please advise.     Phone #: [phone]  Thanks

## 2020-05-01 NOTE — TELEPHONE ENCOUNTER
Verified patient with two types of identifiers. Notified patient to send a remote on the day of her scheduled appointment on 5/14/20. Rescheduled patient for 3 month follow up. Patient verbalized understanding and will call with any other questions.       Future Appointments   Date Time Provider Ian Guerrier   6/3/2020 11:15 AM MD MARJ Gonzalez SCHED   8/20/2020 10:30 AM Beatris DAVIDSON SCHED   8/20/2020 10:40 AM Marcus Live  [address]

## 2020-05-12 DIAGNOSIS — D50.0 IRON DEFICIENCY ANEMIA DUE TO CHRONIC BLOOD LOSS: ICD-10-CM

## 2020-05-12 DIAGNOSIS — I10 HTN (HYPERTENSION), BENIGN: ICD-10-CM

## 2020-05-12 DIAGNOSIS — I63.9 CEREBROVASCULAR ACCIDENT (CVA), UNSPECIFIED MECHANISM (HCC): ICD-10-CM

## 2020-05-12 DIAGNOSIS — E78.00 HYPERCHOLESTEREMIA: ICD-10-CM

## 2020-05-12 RX ORDER — ALBUTEROL SULFATE 90 UG/1
AEROSOL, METERED RESPIRATORY (INHALATION)
Qty: 18 G | Refills: 10 | Status: SHIPPED | OUTPATIENT
Start: 2020-05-12 | End: 2021-05-22

## 2020-05-12 RX ORDER — ATORVASTATIN CALCIUM 20 MG/1
TABLET, FILM COATED ORAL
Qty: 90 TAB | Refills: 10 | Status: SHIPPED | OUTPATIENT
Start: 2020-05-12 | End: 2021-07-07

## 2020-05-14 ENCOUNTER — OFFICE VISIT (OUTPATIENT)
Dept: CARDIOLOGY CLINIC | Age: 85
End: 2020-05-14

## 2020-05-14 DIAGNOSIS — Z95.0 CARDIAC PACEMAKER IN SITU: Primary | ICD-10-CM

## 2020-07-22 ENCOUNTER — HOSPITAL ENCOUNTER (OUTPATIENT)
Dept: LAB | Age: 85
Discharge: HOME | End: 2020-07-22

## 2020-07-22 DIAGNOSIS — I51.89 DIASTOLIC DYSFUNCTION: ICD-10-CM

## 2020-07-22 DIAGNOSIS — I10 HTN (HYPERTENSION), BENIGN: ICD-10-CM

## 2020-07-22 LAB
ALBUMIN SERPL-MCNC: 4 G/DL (ref 3.5–5)
ALBUMIN/GLOB SERPL: 1.2 {RATIO} (ref 1.1–2.2)
ALP SERPL-CCNC: 74 U/L (ref 45–117)
ALT SERPL-CCNC: 23 U/L (ref 12–78)
ANION GAP SERPL CALC-SCNC: 9 MMOL/L (ref 5–15)
AST SERPL-CCNC: 22 U/L (ref 15–37)
BASOPHILS # BLD: 0.1 K/UL (ref 0–0.1)
BASOPHILS NFR BLD: 1 % (ref 0–1)
BILIRUB SERPL-MCNC: 0.9 MG/DL (ref 0.2–1)
BUN SERPL-MCNC: 13 MG/DL (ref 6–20)
BUN/CREAT SERPL: 15 (ref 12–20)
CALCIUM SERPL-MCNC: 9.5 MG/DL (ref 8.5–10.1)
CHLORIDE SERPL-SCNC: 102 MMOL/L (ref 97–108)
CHOLEST SERPL-MCNC: 171 MG/DL
CO2 SERPL-SCNC: 25 MMOL/L (ref 21–32)
CREAT SERPL-MCNC: 0.84 MG/DL (ref 0.55–1.02)
DIFFERENTIAL METHOD BLD: ABNORMAL
EOSINOPHIL # BLD: 0.2 K/UL (ref 0–0.4)
EOSINOPHIL NFR BLD: 3 % (ref 0–7)
ERYTHROCYTE [DISTWIDTH] IN BLOOD BY AUTOMATED COUNT: 13.7 % (ref 11.5–14.5)
GLOBULIN SER CALC-MCNC: 3.4 G/DL (ref 2–4)
GLUCOSE SERPL-MCNC: 80 MG/DL (ref 65–100)
HCT VFR BLD AUTO: 44.6 % (ref 35–47)
HDLC SERPL-MCNC: 97 MG/DL
HDLC SERPL: 1.8 {RATIO} (ref 0–5)
HGB BLD-MCNC: 14.6 G/DL (ref 11.5–16)
IMM GRANULOCYTES # BLD AUTO: 0 K/UL (ref 0–0.04)
IMM GRANULOCYTES NFR BLD AUTO: 1 % (ref 0–0.5)
LDLC SERPL CALC-MCNC: 58.4 MG/DL (ref 0–100)
LIPID PROFILE,FLP: NORMAL
LYMPHOCYTES # BLD: 1.2 K/UL (ref 0.8–3.5)
LYMPHOCYTES NFR BLD: 16 % (ref 12–49)
MCH RBC QN AUTO: 30.9 PG (ref 26–34)
MCHC RBC AUTO-ENTMCNC: 32.7 G/DL (ref 30–36.5)
MCV RBC AUTO: 94.5 FL (ref 80–99)
MONOCYTES # BLD: 0.7 K/UL (ref 0–1)
MONOCYTES NFR BLD: 10 % (ref 5–13)
NEUTS SEG # BLD: 5.2 K/UL (ref 1.8–8)
NEUTS SEG NFR BLD: 69 % (ref 32–75)
NRBC # BLD: 0 K/UL (ref 0–0.01)
NRBC BLD-RTO: 0 PER 100 WBC
PLATELET # BLD AUTO: 232 K/UL (ref 150–400)
PMV BLD AUTO: 10.7 FL (ref 8.9–12.9)
POTASSIUM SERPL-SCNC: 4.6 MMOL/L (ref 3.5–5.1)
PROT SERPL-MCNC: 7.4 G/DL (ref 6.4–8.2)
RBC # BLD AUTO: 4.72 M/UL (ref 3.8–5.2)
SODIUM SERPL-SCNC: 136 MMOL/L (ref 136–145)
TRIGL SERPL-MCNC: 78 MG/DL (ref ?–150)
VLDLC SERPL CALC-MCNC: 15.6 MG/DL
WBC # BLD AUTO: 7.5 K/UL (ref 3.6–11)

## 2020-07-28 DIAGNOSIS — M80.00XD OSTEOPOROSIS WITH CURRENT PATHOLOGICAL FRACTURE WITH ROUTINE HEALING, UNSPECIFIED OSTEOPOROSIS TYPE, SUBSEQUENT ENCOUNTER: ICD-10-CM

## 2020-07-30 RX ORDER — ALENDRONATE SODIUM 70 MG/1
TABLET ORAL
Qty: 12 TAB | Refills: 0 | Status: SHIPPED | OUTPATIENT
Start: 2020-07-30 | End: 2021-06-30 | Stop reason: ALTCHOICE

## 2020-08-19 ENCOUNTER — VIRTUAL VISIT (OUTPATIENT)
Dept: INTERNAL MEDICINE CLINIC | Age: 85
End: 2020-08-19
Payer: MEDICARE

## 2020-08-19 DIAGNOSIS — I48.0 PAF (PAROXYSMAL ATRIAL FIBRILLATION) (HCC): ICD-10-CM

## 2020-08-19 DIAGNOSIS — M80.00XD OSTEOPOROSIS WITH CURRENT PATHOLOGICAL FRACTURE WITH ROUTINE HEALING, UNSPECIFIED OSTEOPOROSIS TYPE, SUBSEQUENT ENCOUNTER: ICD-10-CM

## 2020-08-19 DIAGNOSIS — D50.0 IRON DEFICIENCY ANEMIA DUE TO CHRONIC BLOOD LOSS: Primary | ICD-10-CM

## 2020-08-19 DIAGNOSIS — I10 HTN (HYPERTENSION), BENIGN: ICD-10-CM

## 2020-08-19 DIAGNOSIS — R56.9 SEIZURES (HCC): ICD-10-CM

## 2020-08-19 DIAGNOSIS — I63.9 CEREBROVASCULAR ACCIDENT (CVA), UNSPECIFIED MECHANISM (HCC): ICD-10-CM

## 2020-08-19 PROCEDURE — 99443 PR PHYS/QHP TELEPHONE EVALUATION 21-30 MIN: CPT | Performed by: INTERNAL MEDICINE

## 2020-08-19 NOTE — PROGRESS NOTES
Chronic disease    I was at home while conducting this encounter. Consent:  She and/or her healthcare decision maker is aware that this patient-initiated Telehealth encounter is a billable service, with coverage as determined by her insurance carrier. She is aware that she may receive a bill and has provided verbal consent to proceed: Yes    This virtual visit was conducted telephone encounter only. -  I affirm this is a Patient Initiated Episode with an Established Patient who has not had a related appointment within my department in the past 7 days or scheduled within the next 24 hours. Note: this encounter is not billable if this call serves to triage the patient into an appointment for the relevant concern. Total Time: minutes: 21-30 minutes. Subjective: Dolores Alejo is a 80 y.o.          Patient Active Problem List    Diagnosis    Pacemaker     Dual chamber pacer [employer] 12/17/2018      Sick sinus syndrome due to SA node dysfunction (HCC)     6 second pause with syncope noted on ILR       PAF (paroxysmal atrial fibrillation) ([address]    Cerebrovascular accident (CVA) ([address]    Stroke ([address]    HTN (hypertension), benign    Diastolic dysfunction    Syncope and collapse    HEART (dyspnea on exertion)    COPD (chronic obstructive pulmonary disease) ([address]    Khadar lesion, chronic     Seen on EGD 5/7/13      Iron deficiency anemia    Osteoporosis     Fosamax  2005  No fractures  In all these years    Restarted 2016      Hypercholesteremia     Past Surgical History:   Procedure Laterality Date    CARDIAC SURG PROCEDURE UNLIST  2008    nuclear stress test normal     CARDIAC SURG PROCEDURE UNLIST  2018    pacemaker    ENDOSCOPY, COLON, DIAGNOSTIC  2008, 2013    HX CATARACT REMOVAL      HX GI  2013    egd   khadar ulcer    HX ORTHOPAEDIC  2010    knee replacement    IMPLANT  LOOP RECORDER  11/17/2016         [place] INS NEW/RPLCMT PRM PM W/TRANSV ELTRD ATRIAL&VENT  12/17/2018         [place] RMVL IMPLANTABLE PT-ACTIVATED CAR EVENT RECORDER  12/17/2018            Denies gerd    Tingling hands  Uses carpal tunnel braces  Some weakness    Flu vaccine essential    Home BP is stable   [facility] LOC at home resulting in a compression fracture at T7 and tongue trauma, with 2 prior episodes of unexplained LOC, the last in April and associated with tongue trauma at that time as well. Additionally, MRI of the brain with tiny acute infarct in the right deep white matter. Exam revealed upgoing toe on the left, otherwise unremarkable. EEG was normal. Episodes of LOC with tongue trauma were concerning for seizure. No known seizure risk factors other than age. Family denied concerns about dementia. was in rehab at [address] [place] [address] until 12/1/16. She has not had any subsequent episodes of LOC or unusual spells. She does not remember anything from her hospital stay and is repeating herself more. She is able to take her meds correctly everyday and is remembering her family and friends appropriately. Lives independently in a assisted community. Cooks for herself  Follow up doing well now wioth no pain she is tolerating the fosamax  So far Subjective:  She notes that she is doing quite well. She has had no illness, no flare-ups, no broken bones, tolerating all of her meds. She has had some follow up with cardiology and neurology. She gets around her apartment or her home well. She is socially distancing, not getting out. Daughter-in-law and son come over and help her. Daughter-in-law is PT, does have some contact with COVID-19 at [facility].  Daughter-in-law has been very careful. Patient complains of tingling in her hands. She has had some symptoms before. She said it has been worse lately. She has been wearing her carpal tunnel braces that she has, I am not sure what kind they are. She says they help a little.   They are continuing to bother her and she notes some weakness in her hands, which is somewhat new.     Review of Systems - General ROS: positive for  - fatigue  Psychological ROS: negative for - depression  Hematological and Lymphatic ROS: negative  Endocrine ROS: negative for - hair pattern changes, hot flashes or palpitations  Respiratory ROS: no cough, shortness of breath, or wheezing  Cardiovascular ROS: no chest pain or dyspnea on exertion  Gastrointestinal ROS: no abdominal pain, change in bowel habits, or black or bloody stools  Genito-Urinary ROS: no dysuria, trouble voiding, or hematuria  Musculoskeletal ROS: positive for - gait disturbance, joint pain, joint stiffness and joint swelling  Neurological ROS: no TIA or stroke symptoms  Dermatological ROS: negative for - mole changes, nail changes or skin lesion changes      Loose bowels better  Past Surgical History:   Procedure Laterality Date    CARDIAC SURG PROCEDURE UNLIST  2008    nuclear stress test normal     CARDIAC SURG PROCEDURE UNLIST  2018    pacemaker    ENDOSCOPY, COLON, DIAGNOSTIC  2008, 2013    HX CATARACT REMOVAL      HX GI  2013    egd   kia ulcer    HX ORTHOPAEDIC  2010    knee replacement    IMPLANT  LOOP RECORDER  11/17/2016         [place] INS NEW/RPLCMT PRM PM W/TRANSV ELTRD ATRIAL&VENT  12/17/2018         [place] RMVL IMPLANTABLE PT-ACTIVATED CAR EVENT RECORDER  12/17/2018          Current Outpatient Medications   Medication Sig Dispense Refill    alendronate (FOSAMAX) 70 mg tablet TAKE 1 TABLET BY MOUTH ONCE EVERY 7 DAYS 12 Tab 0    Ventolin HFA 90 mcg/actuation inhaler INHALE 2 PUFFS BY MOUTH EVERY 4 HOURS AS NEEDED FOR WHEEZING OR SHORTNESS OF BREATH 18 g 10    atorvastatin (LIPITOR) 20 mg tablet TAKE ONE TABLET BY MOUTH EVERY DAY 90 Tab 10    omeprazole (PRILOSEC) 20 mg capsule TAKE ONE CAPSULE BY MOUTH EVERY DAY 30 MINUTES BEFORE A MEAL FOR STOMACH ACID 90 Cap 10    lisinopril (PRINIVIL, ZESTRIL) 5 mg tablet TAKE 1 TABLET BY MOUTH EVERY DAY 90 Tab 14    amLODIPine (NORVASC) 5 mg tablet TAKE 1 TABLET BY MOUTH EVERY DAY 90 Tab 4    levETIRAcetam (KEPPRA) 250 mg tablet TAKE ONE TABLET BY MOUTH 2 TIMES A  Tab 3    metoprolol tartrate (LOPRESSOR) 50 mg tablet TAKE ONE TABLET BY MOUTH 2 TIMES A  Tab 3    vit C/E/zinc/lutein/zeaxanthin ([address] PO) Take  by mouth two (2) times a day.  cholecalciferol, vitamin D3, (VITAMIN D3) 2,000 unit tab Take  by mouth.  albuterol (PROAIR HFA) 90 mcg/actuation inhaler Take 2 Puffs by inhalation every four (4) hours as needed for Wheezing or Shortness of Breath. Ventolin brand requested 1 Inhaler 6    ferrous sulfate 325 mg (65 mg iron) tablet Take 1 Tab by mouth every fourty-eight (48) hours. Indications: Iron Deficiency Anemia 100 Tab 6    wheat dextrin (BENEFIBER SUGAR FREE, DEXTRIN,) 3 gram/3.8 gram powd Take  by mouth daily.  acetaminophen (TYLENOL) 500 mg tablet Take 2 Tabs by mouth every six (6) hours. 100 Tab 0    aspirin 81 mg chewable tablet Take 1 Tab by mouth daily. 30 Tab 0    CALCIUM CARBONATE (OYSTER SHELL CALCIUM 500 PO) Take 1 Tab by mouth daily.  latanoprost (XALATAN) 0.005 % ophthalmic solution Administer 1 Drop to both eyes nightly.  timolol (TIMOPTIC) 0.5 % ophthalmic solution Administer 1 Drop to right eye two (2) times a day. No Known Allergies  Family History   Problem Relation Age of Onset    Heart Disease Mother     Heart Disease Father     Heart Disease Sister     Alzheimer Sister     Alcohol abuse Brother      Social History     Tobacco Use    Smoking status: Former Smoker     Years: 30.00     Types: Cigarettes     Last attempt to quit: 2000     Years since quittin.9    Smokeless tobacco: Never Used   Substance Use Topics    Alcohol use:  Yes     Alcohol/week: 6.0 standard drinks     Types: 6 Glasses of wine per week     Patient Active Problem List   Diagnosis Code    Osteoporosis M81.0    Hypercholesteremia E78.00    Iron deficiency anemia D50.9    Khadar lesion, chronic K25.7    HEART (dyspnea on exertion) R06.00    COPD (chronic obstructive pulmonary disease) ([facility]) J44.9    Syncope and collapse L66    Diastolic dysfunction B48.83    HTN (hypertension), benign I10    Stroke ([facility]) I63.9    Cerebrovascular accident (CVA) ([facility]) I63.9    Pacemaker Z95.0    Sick sinus syndrome due to SA node dysfunction ([facility]) I49.5    PAF (paroxysmal atrial fibrillation) ([facility]) I48.0       Activities of Daily Living   Self-care. Requires assistance with: no ADLs    Fall Risk     Fall Risk Assessment, last 12 mths 8/19/2020   Able to walk? Yes   Fall in past 12 months? No   Fall with injury? -   Number of falls in past 12 months -   Fall Risk Score -     Abuse Screen   Patient is not abused    Review of Systems   Pertinent items are noted in HPI. Physical Examination     Evaluation of Cognitive Function:  Mood/affect:  happy  Appearance: age appropriate  Family member/caregiver input: family helps a lot                    Lab Results   Component Value Date/Time    WBC 7.5 07/22/2020 10:45 AM    HGB 14.6 07/22/2020 10:45 AM    Hemoglobin (POC) 11.6 07/10/2013 09:09 PM    HCT 44.6 07/22/2020 10:45 AM    Hematocrit (POC) 34 (L) 07/10/2013 09:09 PM    PLATELET 139 45/18/5979 10:45 AM    MCV 94.5 07/22/2020 10:45 AM     Lab Results   Component Value Date/Time    Cholesterol, total 171 07/22/2020 10:45 AM    HDL Cholesterol 97 07/22/2020 10:45 AM    LDL, calculated 58.4 07/22/2020 10:45 AM    Triglyceride 78 07/22/2020 10:45 AM    CHOL/HDL Ratio 1.8 07/22/2020 10:45 AM     Lab Results   Component Value Date/Time    ALT (SGPT) 23 07/22/2020 10:45 AM    Alk.  phosphatase 74 07/22/2020 10:45 AM    Bilirubin, direct 0.1 01/06/2010 09:58 AM    Bilirubin, total 0.9 07/22/2020 10:45 AM    Albumin 4.0 07/22/2020 10:45 AM    Protein, total 7.4 07/22/2020 10:45 AM    INR 1.0 11/15/2016 10:10 AM    Prothrombin time 10.3 11/15/2016 10:10 AM    PLATELET 444 35/04/8587 10:45 AM       Lab Results   Component Value Date/Time    GFR est non-AA >60 07/22/2020 10:45 AM    GFRNA, POC 39 (L) 07/10/2013 09:09 PM    GFR est AA >60 07/22/2020 10:45 AM    GFRAA, POC 48 (L) 07/10/2013 09:09 PM    Creatinine 0.84 07/22/2020 10:45 AM    Creatinine (POC) 1.3 07/10/2013 09:09 PM    BUN 13 07/22/2020 10:45 AM    BUN (POC) 23 (H) 07/10/2013 09:09 PM    Sodium 136 07/22/2020 10:45 AM    Sodium (POC) 137 07/10/2013 09:09 PM    Potassium 4.6 07/22/2020 10:45 AM    Potassium (POC) 3.8 07/10/2013 09:09 PM    Chloride 102 07/22/2020 10:45 AM    Chloride (POC) 104 07/10/2013 09:09 PM    CO2 25 07/22/2020 10:45 AM    Magnesium 1.8 11/27/2016 04:20 AM    Phosphorus 4.2 11/27/2016 04:20 AM     Lab Results   Component Value Date/Time    Iron 39 02/06/2019 02:57 PM    TIBC 303 02/06/2019 02:57 PM    Iron % saturation 13 (L) 02/06/2019 02:57 PM    Ferritin 120 07/05/2017 01:39 PM           todays vitals are reviewed in detail    No exam  Labs reviewed in detail from 3 weeks ago    1. Iron deficiency anemia due to chronic blood loss  Has resolved continue low dose supplements    2. PAF (paroxysmal atrial fibrillation) (HCC)  stable    3. Seizures ([address]  none    4. Osteoporosis with current pathological fracture with routine healing, unspecified osteoporosis type, subsequent encounter  On fosamax gfr stable   tolerates    5. Cerebrovascular accident (CVA), unspecified mechanism ([address]  No recurrence    6.  HTN (hypertension), benign  controlled

## 2020-08-19 NOTE — PROGRESS NOTES
1. Have you been to the ER, urgent care clinic since your last visit? Hospitalized since your last visit? No    2. Have you seen or consulted any other health care providers outside of the [address] [place] [address] since your last visit? Include any pap smears or colon screening.  No   Chief Complaint   Patient presents with    Hypertension     follow up    Cholesterol Problem     follow up

## 2020-08-26 RX ORDER — LEVETIRACETAM 250 MG/1
TABLET ORAL
Qty: 60 TAB | Refills: 0 | Status: SHIPPED | OUTPATIENT
Start: 2020-08-26 | End: 2020-09-01 | Stop reason: SDUPTHER

## 2020-08-26 NOTE — TELEPHONE ENCOUNTER
Tirso Montoya - Please call pt: She needs a f/u appt made, I will send in Rx for Keppra to last until f/u or she may continue to obtain Rx's from PCP. I have not seen her in over a year. She no showed Feb appt. She may see me or NP, in person or virtually.

## 2020-09-01 ENCOUNTER — OFFICE VISIT (OUTPATIENT)
Dept: NEUROLOGY | Facility: CLINIC | Age: 85
End: 2020-09-01
Payer: MEDICARE

## 2020-09-01 VITALS
DIASTOLIC BLOOD PRESSURE: 70 MMHG | SYSTOLIC BLOOD PRESSURE: 122 MMHG | HEART RATE: 78 BPM | OXYGEN SATURATION: 94 % | HEIGHT: 58 IN | RESPIRATION RATE: 16 BRPM | WEIGHT: 123 LBS | BODY MASS INDEX: 25.82 KG/M2

## 2020-09-01 DIAGNOSIS — R56.9 SEIZURES (HCC): Primary | ICD-10-CM

## 2020-09-01 PROCEDURE — 1101F PT FALLS ASSESS-DOCD LE1/YR: CPT | Performed by: NURSE PRACTITIONER

## 2020-09-01 PROCEDURE — 1090F PRES/ABSN URINE INCON ASSESS: CPT | Performed by: NURSE PRACTITIONER

## 2020-09-01 PROCEDURE — G8536 NO DOC ELDER MAL SCRN: HCPCS | Performed by: NURSE PRACTITIONER

## 2020-09-01 PROCEDURE — G8427 DOCREV CUR MEDS BY ELIG CLIN: HCPCS | Performed by: NURSE PRACTITIONER

## 2020-09-01 PROCEDURE — G8432 DEP SCR NOT DOC, RNG: HCPCS | Performed by: NURSE PRACTITIONER

## 2020-09-01 PROCEDURE — G8417 CALC BMI ABV UP PARAM F/U: HCPCS | Performed by: NURSE PRACTITIONER

## 2020-09-01 PROCEDURE — 99214 OFFICE O/P EST MOD 30 MIN: CPT | Performed by: NURSE PRACTITIONER

## 2020-09-01 RX ORDER — LEVETIRACETAM 250 MG/1
250 TABLET ORAL 2 TIMES DAILY
Qty: 60 TAB | Refills: 0 | Status: SHIPPED | OUTPATIENT
Start: 2020-09-01 | End: 2020-10-30 | Stop reason: SDUPTHER

## 2020-09-01 NOTE — PROGRESS NOTES
Date:  20     Name:  Dagmar Yun  :  1933  MRN:  [id number]     PCP:  Samara Hernández MD    No chief complaint on file. HISTORY OF PRESENT ILLNESS:The patient start seeing Dr. Gladys Braun  after an hospitalized at [facility] 11/15- for episode of LOC at home resulting in a compression fracture at T7 and tongue trauma, with 2 prior episodes of unexplained LOC, the last in  and associated with tongue trauma at that time as well. She was then placed on Keppra empirically . Feliciano Dickson is accompanied by her son. Patient reports that she has been doing well. Denies any seizure activity. She is currently taking Keppra to 250mg twice daily. She seems to be doing well; will occasionally have thoughts about her ex- who passed away 4 years ago. She also will reminisce about old friends when she goes through her address book. She denies depression   Review of Systems   Constitutional: Negative. Neurological: Negative for dizziness, seizures, weakness and headaches. Current Outpatient Medications   Medication Sig    levETIRAcetam (KEPPRA) 250 mg tablet Take 1 Tab by mouth two (2) times a day. TAKE ONE TABLET BY MOUTH 2 TIMES A DAY    metoprolol tartrate (LOPRESSOR) 50 mg tablet TAKE 1 TABLET BY MOUTH 2 TIMES A DAY    alendronate (FOSAMAX) 70 mg tablet TAKE 1 TABLET BY MOUTH ONCE EVERY 7 DAYS    Ventolin HFA 90 mcg/actuation inhaler INHALE 2 PUFFS BY MOUTH EVERY 4 HOURS AS NEEDED FOR WHEEZING OR SHORTNESS OF BREATH    atorvastatin (LIPITOR) 20 mg tablet TAKE ONE TABLET BY MOUTH EVERY DAY    omeprazole (PRILOSEC) 20 mg capsule TAKE ONE CAPSULE BY MOUTH EVERY DAY 30 MINUTES BEFORE A MEAL FOR STOMACH ACID    lisinopril (PRINIVIL, ZESTRIL) 5 mg tablet TAKE 1 TABLET BY MOUTH EVERY DAY    amLODIPine (NORVASC) 5 mg tablet TAKE 1 TABLET BY MOUTH EVERY DAY    vit C/E/zinc/lutein/zeaxanthin ([facility] PO) Take  by mouth two (2) times a day.     cholecalciferol, vitamin D3, (VITAMIN D3) 2,000 unit tab Take  by mouth.  albuterol (PROAIR HFA) 90 mcg/actuation inhaler Take 2 Puffs by inhalation every four (4) hours as needed for Wheezing or Shortness of Breath. Ventolin brand requested    ferrous sulfate 325 mg (65 mg iron) tablet Take 1 Tab by mouth every fourty-eight (48) hours. Indications: Iron Deficiency Anemia    wheat dextrin (BENEFIBER SUGAR FREE, DEXTRIN,) 3 gram/3.8 gram powd Take  by mouth daily.  acetaminophen (TYLENOL) 500 mg tablet Take 2 Tabs by mouth every six (6) hours.  aspirin 81 mg chewable tablet Take 1 Tab by mouth daily.  CALCIUM CARBONATE (OYSTER SHELL CALCIUM 500 PO) Take 1 Tab by mouth daily.  latanoprost (XALATAN) 0.005 % ophthalmic solution Administer 1 Drop to both eyes nightly.  timolol (TIMOPTIC) 0.5 % ophthalmic solution Administer 1 Drop to right eye two (2) times a day. No current facility-administered medications for this visit. No Known Allergies  Past Medical History:   Diagnosis Date    Anemia     Arthritis     Khadar lesion, chronic 5/8/2013    Seen on EGD 5/7/13    Chronic obstructive pulmonary disease ([address]     CVA (cerebral vascular accident) ([address]     Tiny punctate infarct right frontal lobe, 10/2016.     HEART (dyspnea on exertion) 6/12/2015    GERD (gastroesophageal reflux disease)     Hypercholesterolemia     Hypertension 9/22/2010    Ill-defined condition     Osteoporosis 9/22/2010    Seizures ([address]     Sick sinus syndrome due to SA node dysfunction ([address] 12/17/2018     Past Surgical History:   Procedure Laterality Date    CARDIAC SURG PROCEDURE UNLIST  2008    nuclear stress test normal     CARDIAC SURG PROCEDURE UNLIST  2018    pacemaker    ENDOSCOPY, COLON, DIAGNOSTIC  2008, 2013    HX CATARACT REMOVAL      HX GI  2013    egd   khadar ulcer    HX ORTHOPAEDIC  2010    knee replacement    IMPLANT  LOOP RECORDER  11/17/2016         [place] INS NEW/RPLCMT PRM PM W/TRANSV ELTRD ATRIAL&VENT  2018         [place] RMVL IMPLANTABLE PT-ACTIVATED CAR EVENT RECORDER  2018          Social History     Socioeconomic History    Marital status: SINGLE     Spouse name: Not on file    Number of children: Not on file    Years of education: Not on file    Highest education level: Not on file   Occupational History    Not on file   Social Needs    Financial resource strain: Not on file    Food insecurity     Worry: Not on file     Inability: Not on file    Transportation needs     Medical: Not on file     Non-medical: Not on file   Tobacco Use    Smoking status: Former Smoker     Years: 30.00     Types: Cigarettes     Last attempt to quit: 2000     Years since quittin.9    Smokeless tobacco: Never Used   Substance and Sexual Activity    Alcohol use:  Yes     Alcohol/week: 6.0 standard drinks     Types: 6 Glasses of wine per week    Drug use: No    Sexual activity: Not Currently   Lifestyle    Physical activity     Days per week: Not on file     Minutes per session: Not on file    Stress: Not on file   Relationships    Social connections     Talks on phone: Not on file     Gets together: Not on file     Attends Jewish service: Not on file     Active member of club or organization: Not on file     Attends meetings of clubs or organizations: Not on file     Relationship status: Not on file    Intimate partner violence     Fear of current or ex partner: Not on file     Emotionally abused: Not on file     Physically abused: Not on file     Forced sexual activity: Not on file   Other Topics Concern    Not on file   Social History Narrative    Not on file     Family History   Problem Relation Age of Onset    Heart Disease Mother     Heart Disease Father     Heart Disease Sister     Alzheimer Sister     Alcohol abuse Brother        PHYSICAL EXAMINATION:    Visit Vitals  /70 (BP 1 Location: Left arm, BP Patient Position: Sitting)   Pulse 78   Resp 16   Ht 4' 10\" (1.473 m)   Wt 55.8 kg (123 lb)   SpO2 94%   BMI 25.71 kg/m²       General: Well developed, well nourished. Patient in no apparent distress   Head: Normocephalic, atraumatic, anicteric sclera   Lungs:  Clear to auscultation bilaterally, No wheezes or rubs   Cardiac: Regular rate and rhythm with no murmurs. Ext: No pedal edema   Skin: No overt signs of rash      Neurological Exam:  Mental Status: Alert and oriented to person place and time   Speech: Fluent no aphasia or dysarthria. Cranial Nerves:   Intact visual fields. Facial sensation is normal. Facial movement is symmetric. Palate is midline. Normal sternocleidomastoid strength. Tongue is midline. Diminished with hearing aids in place   Eyes: PERRL, EOM's full, no nystagmus, no ptosis. Motor:  Full and symmetric strength of upper and lower proximal and distal muscles. Normal bulk and tone. Reflexes:   Deep tendon reflexes 2+/4 and symmetrical.  Plantar response is downgoing b/l. Sensory:   Symmetrically intact  with no perceived deficits modalities involving small or large fibers. Gait:   Slow cautious gait without cane   Tremor:   No tremor noted. Cerebellar:  Finger to nose and heel over shin to knee was demonstrated competently. Neurovascular: No carotid bruits. ASSESSMENT AND PLAN    ICD-10-CM ICD-9-CM    1. Seizures ([address]  R56.9 780.39      1. Seizures (HCC)   - Continue Keppra 250 mg BID   -Follow up in 1 year  This note will not be viewable in MyChart.   Kandy Ogden NP

## 2020-10-27 ENCOUNTER — CLINICAL SUPPORT (OUTPATIENT)
Dept: CARDIOLOGY CLINIC | Age: 85
End: 2020-10-27
Payer: MEDICARE

## 2020-10-27 ENCOUNTER — OFFICE VISIT (OUTPATIENT)
Dept: CARDIOLOGY CLINIC | Age: 85
End: 2020-10-27
Payer: MEDICARE

## 2020-10-27 VITALS
HEIGHT: 58 IN | WEIGHT: 126 LBS | BODY MASS INDEX: 26.45 KG/M2 | SYSTOLIC BLOOD PRESSURE: 142 MMHG | HEART RATE: 80 BPM | DIASTOLIC BLOOD PRESSURE: 72 MMHG

## 2020-10-27 DIAGNOSIS — I47.29 NSVT (NONSUSTAINED VENTRICULAR TACHYCARDIA): ICD-10-CM

## 2020-10-27 DIAGNOSIS — I49.5 SICK SINUS SYNDROME (HCC): ICD-10-CM

## 2020-10-27 DIAGNOSIS — Z86.73 HX OF COMPLETED STROKE: ICD-10-CM

## 2020-10-27 DIAGNOSIS — I10 HTN (HYPERTENSION), BENIGN: ICD-10-CM

## 2020-10-27 DIAGNOSIS — I47.1 PAT (PAROXYSMAL ATRIAL TACHYCARDIA) (HCC): ICD-10-CM

## 2020-10-27 DIAGNOSIS — R32 URINARY INCONTINENCE, UNSPECIFIED TYPE: ICD-10-CM

## 2020-10-27 DIAGNOSIS — Z95.0 CARDIAC PACEMAKER IN SITU: Primary | ICD-10-CM

## 2020-10-27 DIAGNOSIS — I48.0 PAF (PAROXYSMAL ATRIAL FIBRILLATION) (HCC): ICD-10-CM

## 2020-10-27 DIAGNOSIS — I48.92 ATRIAL FLUTTER, PAROXYSMAL (HCC): ICD-10-CM

## 2020-10-27 PROCEDURE — G8536 NO DOC ELDER MAL SCRN: HCPCS | Performed by: INTERNAL MEDICINE

## 2020-10-27 PROCEDURE — 1090F PRES/ABSN URINE INCON ASSESS: CPT | Performed by: INTERNAL MEDICINE

## 2020-10-27 PROCEDURE — 93280 PM DEVICE PROGR EVAL DUAL: CPT | Performed by: INTERNAL MEDICINE

## 2020-10-27 PROCEDURE — 1101F PT FALLS ASSESS-DOCD LE1/YR: CPT | Performed by: INTERNAL MEDICINE

## 2020-10-27 PROCEDURE — 99214 OFFICE O/P EST MOD 30 MIN: CPT | Performed by: INTERNAL MEDICINE

## 2020-10-27 PROCEDURE — G0463 HOSPITAL OUTPT CLINIC VISIT: HCPCS | Performed by: INTERNAL MEDICINE

## 2020-10-27 PROCEDURE — G8432 DEP SCR NOT DOC, RNG: HCPCS | Performed by: INTERNAL MEDICINE

## 2020-10-27 PROCEDURE — G8417 CALC BMI ABV UP PARAM F/U: HCPCS | Performed by: INTERNAL MEDICINE

## 2020-10-27 PROCEDURE — G8427 DOCREV CUR MEDS BY ELIG CLIN: HCPCS | Performed by: INTERNAL MEDICINE

## 2020-10-27 RX ORDER — TOLTERODINE TARTRATE 1 MG/1
1 TABLET, EXTENDED RELEASE ORAL 2 TIMES DAILY
Qty: 180 TAB | Refills: 1 | Status: SHIPPED | OUTPATIENT
Start: 2020-10-27 | End: 2021-11-04

## 2020-10-27 RX ORDER — METOPROLOL TARTRATE 100 MG/1
100 TABLET ORAL 2 TIMES DAILY
Qty: 180 TAB | Refills: 1 | Status: SHIPPED | OUTPATIENT
Start: 2020-10-27 | End: 2021-07-07

## 2020-10-27 NOTE — PATIENT INSTRUCTIONS
Start Detrol 1 mg twice daily Increase Metoprolol 100 mg twice daily A referral has been sent for you to see Dr. Daniel Pollard. They will be in contact with you to scheduled an appointment. If you do not receive a call by next week please contact them at [phone].

## 2020-10-27 NOTE — PROGRESS NOTES
Cardiac Electrophysiology Office Note     Subjective: Steven Reyes is a 80 y.o. patient who is seen for follow up on pacemaker  Her son is with her  She has arthritis bad to the point she cannot use her hands and in pain with tylenol  She also complains about incontinence  No UTI or fever  bp is usually higher in medical office  No fall but need cane    She had syncope with 6 second pause    ILR recorder showed 30 seconds of atrial flutter for 30 second on February 23, 2017   She has dual chamber pacer and is using 80% atrial pacing    When I saw her in the past at [facility]:  She was seen for evaluation of syncope kindly referred by Dr. Gisele Yo.    PMHx includes chronic anemia, hypertension.    The patient reports she has had a total of 4 episodes of syncope, most recent one was yesterday, March 2016, 2010 and 2008.      She came to [facility], MRI of head revealed suspect small acute posterior frontal centrum semiovale lacunar infarct. MRA of neck- normal  Echo shows normal LVEF, no RWMA. Mild TR and pulmonic valve regurgitation. Xray of spine showed T7 compression fracture of unknown age.       Prior to this she had a similar episode in March of this year, she was getting ready for bed and while walking to the bed she collapsed and lacerated her lip on the bookcase, this did require a hospital visit and sutures. Episode in 2010, similar except she did have associated dizziness prior to fainting. She went to [facility] at this time and was told she had a TIA. Episode in 2008 as well.    She denies hx of MI/DVT/PE/DM. She is very independent at home. No issues ambulating.    No family hx of pacemaker.    Mother/father and brother CAD.      Problem List  Date Reviewed: 10/27/2020          Codes Class Noted    Pacemaker ICD-10-CM: Z95.0  ICD-9-CM: V45.01  12/17/2018    Overview Signed 12/17/2018 12:54 PM by Silverio Gomez MD     Dual chamber pacer Medtronic 12/17/2018             Sick sinus syndrome due to SA node dysfunction [facility]) ICD-10-CM: I49.5  ICD-9-CM: 427.81  12/17/2018    Overview Signed 12/17/2018 12:55 PM by Octaviano Dallas MD     6 second pause with syncope noted on ILR              PAF (paroxysmal atrial fibrillation) ([facility] ICD-10-CM: I48.0  ICD-9-CM: 427.31  12/17/2018        Cerebrovascular accident (CVA) ([facility] ICD-10-CM: I63.9  ICD-9-CM: 434.91  8/14/2017        Stroke ([facility] ICD-10-CM: I63.9  ICD-9-CM: 434.91  11/16/2016        HTN (hypertension), benign ICD-10-CM: I10  ICD-9-CM: 401.1  9/87/2045        Diastolic dysfunction [facility]: I51.89  ICD-9-CM: 429.9  4/1/2016        Syncope and collapse ICD-10-CM: R55  ICD-9-CM: 780.2  3/31/2016        HEART (dyspnea on exertion) ICD-10-CM: R06.00  ICD-9-CM: 786.09  6/12/2015        Khadar lesion, chronic ICD-10-CM: K25.7  ICD-9-CM: 531.70  5/8/2013    Overview Signed 5/8/2013  5:52 PM by Paula Ramirez MD     Seen on EGD 5/7/13             Iron deficiency anemia ICD-10-CM: D50.9  ICD-9-CM: 280.9  9/18/2012        Osteoporosis ICD-10-CM: M81.0  ICD-9-CM: 733.00  9/22/2010    Overview Addendum 2/20/2019  2:58 PM by Paula Ramirez MD     Fosamax  2005  No fractures  In all these years    Restarted 2016             Hypercholesteremia ICD-10-CM: E78.00  ICD-9-CM: 272.0  9/22/2010              Current Outpatient Medications   Medication Sig Dispense Refill    levETIRAcetam (KEPPRA) 250 mg tablet Take 1 Tab by mouth two (2) times a day.  TAKE ONE TABLET BY MOUTH 2 TIMES A DAY 60 Tab 0    metoprolol tartrate (LOPRESSOR) 50 mg tablet TAKE 1 TABLET BY MOUTH 2 TIMES A  Tab 3    alendronate (FOSAMAX) 70 mg tablet TAKE 1 TABLET BY MOUTH ONCE EVERY 7 DAYS 12 Tab 0    Ventolin HFA 90 mcg/actuation inhaler INHALE 2 PUFFS BY MOUTH EVERY 4 HOURS AS NEEDED FOR WHEEZING OR SHORTNESS OF BREATH 18 g 10    atorvastatin (LIPITOR) 20 mg tablet TAKE ONE TABLET BY MOUTH EVERY DAY 90 Tab 10    omeprazole (PRILOSEC) 20 mg capsule TAKE ONE CAPSULE BY MOUTH EVERY DAY 30 MINUTES BEFORE A MEAL FOR STOMACH ACID 90 Cap 10    lisinopril (PRINIVIL, ZESTRIL) 5 mg tablet TAKE 1 TABLET BY MOUTH EVERY DAY 90 Tab 14    amLODIPine (NORVASC) 5 mg tablet TAKE 1 TABLET BY MOUTH EVERY DAY 90 Tab 4    vit C/E/zinc/lutein/zeaxanthin (OCUVITE EYE HEALTH PO) Take  by mouth two (2) times a day.  cholecalciferol, vitamin D3, (VITAMIN D3) 2,000 unit tab Take  by mouth.  albuterol (PROAIR HFA) 90 mcg/actuation inhaler Take 2 Puffs by inhalation every four (4) hours as needed for Wheezing or Shortness of Breath. Ventolin brand requested 1 Inhaler 6    ferrous sulfate 325 mg (65 mg iron) tablet Take 1 Tab by mouth every fourty-eight (48) hours. Indications: Iron Deficiency Anemia 100 Tab 6    wheat dextrin (BENEFIBER SUGAR FREE, DEXTRIN,) 3 gram/3.8 gram powd Take  by mouth daily.  acetaminophen (TYLENOL) 500 mg tablet Take 2 Tabs by mouth every six (6) hours. 100 Tab 0    aspirin 81 mg chewable tablet Take 1 Tab by mouth daily. 30 Tab 0    CALCIUM CARBONATE (OYSTER SHELL CALCIUM 500 PO) Take 1 Tab by mouth daily.  latanoprost (XALATAN) 0.005 % ophthalmic solution Administer 1 Drop to both eyes nightly.  timolol (TIMOPTIC) 0.5 % ophthalmic solution Administer 1 Drop to right eye two (2) times a day. No Known Allergies  Past Medical History:   Diagnosis Date    Anemia     Arthritis     Khadar lesion, chronic 5/8/2013    Seen on EGD 5/7/13    Chronic obstructive pulmonary disease ([address]     CVA (cerebral vascular accident) ([address]     Tiny punctate infarct right frontal lobe, 10/2016.     HEART (dyspnea on exertion) 6/12/2015    GERD (gastroesophageal reflux disease)     Hypercholesterolemia     Hypertension 9/22/2010    Ill-defined condition     Osteoporosis 9/22/2010    Seizures ([address]     Sick sinus syndrome due to SA node dysfunction ([address] 12/17/2018     Past Surgical History:   Procedure Laterality Date    CARDIAC SURG PROCEDURE UNLIST  2008    nuclear stress test normal    Katiuska Mendez CARDIAC SURG PROCEDURE UNLIST  2018    pacemaker    ENDOSCOPY, COLON, DIAGNOSTIC  ,     HX CATARACT REMOVAL      HX GI      egd   kia ulcer    HX ORTHOPAEDIC  2010    knee replacement    IMPLANT  LOOP RECORDER  2016         [place] INS NEW/RPLCMT PRM PM W/TRANSV ELTRD ATRIAL&VENT  2018         [place] RMVL IMPLANTABLE PT-ACTIVATED CAR EVENT RECORDER  2018          Family History   Problem Relation Age of Onset    Heart Disease Mother     Heart Disease Father     Heart Disease Sister     Alzheimer Sister     Alcohol abuse Brother      Social History     Tobacco Use    Smoking status: Former Smoker     Years: 30.00     Types: Cigarettes     Last attempt to quit: 2000     Years since quittin.1    Smokeless tobacco: Never Used   Substance Use Topics    Alcohol use: Yes     Alcohol/week: 6.0 standard drinks     Types: 6 Glasses of wine per week        Review of Systems: all other systems negative  Constitutional: Negative for fever, chills, weight loss, malaise/fatigue. HEENT: Negative for nosebleeds, vision changes. Respiratory: Negative for cough, hemoptysis  Cardiovascular: Negative for chest pain, palpitations, orthopnea, claudication, syncope, and PND. Gastrointestinal: Negative for nausea, vomiting, soft stools    Genitourinary: Negative for dysuria, and hematuria. + urinary incontinence  Musculoskeletal: Negative for myalgias, + arthralgia. Skin: Negative for rash. Heme: Does not bleed or bruise easily. Neurological: Negative for speech change and focal weakness     Objective:     Visit Vitals  BP (!) 142/72   Pulse 80   Ht 4' 10\" (1.473 m)   Wt 126 lb (57.2 kg)   BMI 26.33 kg/m²       Physical Exam:   Constitutional: well-nourished. No respiratory distress. Head: Normocephalic and atraumatic. Eyes: Pupils are equal, round  ENT: hearing normal  Neck: supple. No JVD present. Cardiovascular: Normal rate, regular rhythm.  Exam reveals no gallop and no friction rub. No murmur heard. Pulmonary/Chest: Effort normal and breath sounds normal. No wheezes. Abdominal: Soft, no tenderness. mild obesity  Musculoskeletal: trace ankle edema. Neurological: alert,oriented. walk in with a cane  Skin: Skin is warm and dry. Left sided pacemaker site healed  Psychiatric: normal mood and affect. Behavior is normal. Judgment and thought content normal.         Assessment/Plan:       ICD-10-CM ICD-9-CM    1. Cardiac pacemaker in situ  Z95.0 V45.01    2. PAT (paroxysmal atrial tachycardia) ([facility])  I47.1 427.0    3. Atrial flutter, paroxysmal ([facility])  I48.92 427.32    4. NSVT (nonsustained ventricular tachycardia) ([facility])  I47.2 427.1    5. HTN (hypertension), benign  I10 401.1    6. Sick sinus syndrome ([facility])  I49.5 427.81    7. Hx of completed stroke  Z86.73 V12.54    8. PAF (paroxysmal atrial fibrillation) ([facility])  I48.0 427.31    9. Urinary incontinence, unspecified type  R32 788.30       She has BP elevated so I increase metoprolol to 100 mg bid for PAT and NSVT on pacemaker  She has 80 % atrial pacing  I had Discussed possibility of starting a [address] if he continues to have more episodes of atrial flutter or fibrillation. I told him and her that my decisions about anticoagulant may change over time depends on the frequency and duration of atrial flutter.   She is seeing Dr Audelia Ponce of neurology  She agrees and wants to try detrol LA for overactive bladder and incontinence and referred to rheumatologist for OA pain as these affect her quality of life  Son was present   Future Appointments   Date Time Provider Ian Guerrier   2/10/2021  3:00 PM REMOTE1, JANIA ROWLEY AMB   5/12/2021 10:15 AM REMOTE1, JANIA ROWLEY AMB   8/16/2021 11:15 AM REMOTE1, JANIA ROWLEY AMB   9/8/2021 11:40 AM MD POLY Charles BS AMB   11/4/2021 10:20 AM PACEMAKER3, JANIA ROWLEY AMB   11/4/2021 10:40 AM MD KWAKU Almeida BS AMB       Thank you for involving me in this patient's care and please call with further concerns or questions. Aylin Rodriguez M.D.   Electrophysiology/Cardiology  [facility] [place]  [facility], [facility], [facility] [place]  [phone]

## 2020-10-29 ENCOUNTER — TELEPHONE (OUTPATIENT)
Dept: CARDIOLOGY CLINIC | Age: 85
End: 2020-10-29

## 2020-10-29 NOTE — TELEPHONE ENCOUNTER
Received PA request for Tolterodine. PA completed via CoverMeds. PA pending.      Key: [id number]    Case ID: [id number]

## 2020-11-05 RX ORDER — LEVETIRACETAM 250 MG/1
250 TABLET ORAL 2 TIMES DAILY
Qty: 180 TAB | Refills: 0 | Status: SHIPPED | OUTPATIENT
Start: 2020-11-05 | End: 2021-01-27

## 2020-11-05 NOTE — TELEPHONE ENCOUNTER
Received a fax from [facility] stating that Tolterodine has been denied. Verified patient with two types of identifiers. Notified patient of denial. Patient states it is not a pressing issue for her at the time. Notified patient to discuss with PCP or Urologist. Patient verbalized understanding and will call with any other questions.       Future Appointments   Date Time Provider Ian Guerrier   2/10/2021  3:00 PM Von AMES BS AMB   5/12/2021 10:15 AM REMOTE1, JANIA AMES BS AMB   8/16/2021 11:15 AM REMOTE1, JANIA AMES BS AMB   9/8/2021 11:40 AM MD POLY Up BS AMB   11/4/2021 10:20 AM PACEMAKER3, JANIA AMES BS AMB   11/4/2021 10:40 AM MD KWAKU Astorga BS AMB

## 2020-12-20 RX ORDER — LISINOPRIL 5 MG/1
TABLET ORAL
Qty: 90 TAB | Refills: 0 | Status: SHIPPED | OUTPATIENT
Start: 2020-12-20 | End: 2021-02-21

## 2020-12-20 RX ORDER — AMLODIPINE BESYLATE 5 MG/1
TABLET ORAL
Qty: 90 TAB | Refills: 0 | Status: SHIPPED | OUTPATIENT
Start: 2020-12-20 | End: 2021-03-11

## 2021-01-27 RX ORDER — LEVETIRACETAM 250 MG/1
TABLET ORAL
Qty: 180 TAB | Refills: 0 | Status: SHIPPED | OUTPATIENT
Start: 2021-01-27 | End: 2021-07-16 | Stop reason: SDUPTHER

## 2021-02-10 ENCOUNTER — OFFICE VISIT (OUTPATIENT)
Dept: CARDIOLOGY CLINIC | Age: 86
End: 2021-02-10
Payer: MEDICARE

## 2021-02-10 DIAGNOSIS — Z95.0 CARDIAC PACEMAKER IN SITU: Primary | ICD-10-CM

## 2021-02-10 PROCEDURE — 93294 REM INTERROG EVL PM/LDLS PM: CPT | Performed by: INTERNAL MEDICINE

## 2021-02-10 PROCEDURE — 93296 REM INTERROG EVL PM/IDS: CPT | Performed by: INTERNAL MEDICINE

## 2021-02-10 NOTE — LETTER
2/10/2021 10:19 AM 
 
Ms. Hermelinda Florescel [address] [facility] [phone] After careful review of your remote check of your implated device on 2-06-24 I have concluded that your device is working properly. Your next: 
 * Automatic remote check ( from home) is scheduled for  5-12-21 *Only Medtronic pacemakers  must  sent a manual transmission. If you have any questions, please call [address] at [phone]. Additional Comments: ________________________________________________ 
 
__________________________________________________________________ 
 
__________________________________________________________________ Katherin Mccarty MD [facility]

## 2021-02-21 RX ORDER — LISINOPRIL 5 MG/1
TABLET ORAL
Qty: 90 TAB | Refills: 0 | Status: SHIPPED | OUTPATIENT
Start: 2021-02-21 | End: 2021-03-20

## 2021-03-03 ENCOUNTER — TELEPHONE (OUTPATIENT)
Dept: CARDIOLOGY CLINIC | Age: 86
End: 2021-03-03

## 2021-03-03 NOTE — TELEPHONE ENCOUNTER
Received copy of patient's recent flu vaccine. Patient requested I add to chart. Flu vaccination added to chart. Verified patient with two types of identifiers. Notified vaccine had been added to chart. Patient verbalized understanding and will call with any other questions.       Future Appointments   Date Time Provider Ian Guerrier   5/12/2021 10:15 AM Elver AMES BS AMB   8/16/2021 11:15 AM JANIA GLASS BS AMB   9/8/2021 11:40 AM MD POLY Stallworth BS AMB   11/4/2021 10:20 AM [email or web address] BS AMB   11/4/2021 10:40 AM MD KWAKU Mukherjee BS AMB

## 2021-03-11 RX ORDER — AMLODIPINE BESYLATE 5 MG/1
TABLET ORAL
Qty: 30 TAB | Refills: 0 | Status: SHIPPED | OUTPATIENT
Start: 2021-03-11 | End: 2021-03-20

## 2021-03-19 DIAGNOSIS — I63.9 CEREBROVASCULAR ACCIDENT (CVA), UNSPECIFIED MECHANISM (HCC): ICD-10-CM

## 2021-03-19 DIAGNOSIS — I10 HTN (HYPERTENSION), BENIGN: ICD-10-CM

## 2021-03-19 DIAGNOSIS — D50.0 IRON DEFICIENCY ANEMIA DUE TO CHRONIC BLOOD LOSS: ICD-10-CM

## 2021-03-19 DIAGNOSIS — E78.00 HYPERCHOLESTEREMIA: ICD-10-CM

## 2021-03-20 RX ORDER — LISINOPRIL 5 MG/1
TABLET ORAL
Qty: 90 TAB | Refills: 0 | Status: SHIPPED | OUTPATIENT
Start: 2021-03-20 | End: 2021-07-12

## 2021-03-20 RX ORDER — OMEPRAZOLE 20 MG/1
CAPSULE, DELAYED RELEASE ORAL
Qty: 90 CAP | Refills: 10 | Status: SHIPPED | OUTPATIENT
Start: 2021-03-20 | End: 2022-03-27

## 2021-03-20 RX ORDER — AMLODIPINE BESYLATE 5 MG/1
TABLET ORAL
Qty: 30 TAB | Refills: 0 | Status: SHIPPED | OUTPATIENT
Start: 2021-03-20 | End: 2021-04-02 | Stop reason: SDUPTHER

## 2021-04-02 RX ORDER — AMLODIPINE BESYLATE 5 MG/1
TABLET ORAL
Qty: 30 TAB | Refills: 0 | Status: SHIPPED | OUTPATIENT
Start: 2021-04-02 | End: 2021-05-09

## 2021-05-09 RX ORDER — AMLODIPINE BESYLATE 5 MG/1
TABLET ORAL
Qty: 30 TAB | Refills: 0 | Status: SHIPPED | OUTPATIENT
Start: 2021-05-09 | End: 2021-06-30 | Stop reason: SDUPTHER

## 2021-05-12 ENCOUNTER — OFFICE VISIT (OUTPATIENT)
Dept: CARDIOLOGY CLINIC | Age: 86
End: 2021-05-12
Payer: MEDICARE

## 2021-05-12 DIAGNOSIS — Z95.0 CARDIAC PACEMAKER IN SITU: Primary | ICD-10-CM

## 2021-05-12 PROCEDURE — 93296 REM INTERROG EVL PM/IDS: CPT | Performed by: INTERNAL MEDICINE

## 2021-05-12 PROCEDURE — 93294 REM INTERROG EVL PM/LDLS PM: CPT | Performed by: INTERNAL MEDICINE

## 2021-05-12 NOTE — LETTER
2021 2:16 PM 
 
Ms. Ranjeet Tam [address] [place] [phone] Dear Patient, This letter is to inform you that as of  [facility] will no longer be sending out confirmation letters for remote transmissions through the [employer]). All letters in the future will be posted in an electronic medical records format therefore we highly encourage enrollment in [employer] patient's portal. Once enrolled you will have access to any letters we send as well as appointment information that can be found in your medical record. Our EP team would contact you via phone if there are significant abnormal findings so you can discuss with Dr. Shakir Hameed further in office. Missed transmission letters will also be digitized in [employer] but we will continue to send those out through the mail as well. How to register for [employer]: - Visit [employer] - Click Create an Account - Provide your name, address, and  
- You will then be asked a short series of questions to verify your identity. This helps to ensure that no one else can access your information.  
- If you have any further questions, please contact our office at [phone]. If you choose not to enroll in [employer] or do not have internet access, please kindly let device clinic know and we will accommodate your preference. We are grateful for your understanding and looking forward to this new, improved and more efficient way of communication. Warm Regards, [facility] Staff

## 2021-05-12 NOTE — LETTER
5/12/2021 2:16 PM 
 
Ms. Marva Garcia [address] [place] [phone] After careful review of your remote check of your implated device on 1-10-21. I have concluded that your device is working properly. Your next: Automatic remote check ( from home) is scheduled for  8-16-21 If you have any questions, please call [address] at [phone]. Additional Comments: ________________________________________________ 
 
__________________________________________________________________ 
 
__________________________________________________________________ [place], Royston Carrel, MD [facility]

## 2021-05-22 RX ORDER — ALBUTEROL SULFATE 90 UG/1
AEROSOL, METERED RESPIRATORY (INHALATION)
Qty: 18 G | Refills: 10 | Status: SHIPPED | OUTPATIENT
Start: 2021-05-22 | End: 2021-11-04

## 2021-06-30 ENCOUNTER — OFFICE VISIT (OUTPATIENT)
Dept: INTERNAL MEDICINE CLINIC | Age: 86
End: 2021-06-30
Payer: MEDICARE

## 2021-06-30 VITALS
RESPIRATION RATE: 16 BRPM | HEART RATE: 76 BPM | TEMPERATURE: 97 F | SYSTOLIC BLOOD PRESSURE: 125 MMHG | WEIGHT: 120.2 LBS | BODY MASS INDEX: 25.93 KG/M2 | HEIGHT: 57 IN | OXYGEN SATURATION: 94 % | DIASTOLIC BLOOD PRESSURE: 67 MMHG

## 2021-06-30 DIAGNOSIS — I49.5 SICK SINUS SYNDROME (HCC): ICD-10-CM

## 2021-06-30 DIAGNOSIS — M80.00XD OSTEOPOROSIS WITH CURRENT PATHOLOGICAL FRACTURE WITH ROUTINE HEALING, UNSPECIFIED OSTEOPOROSIS TYPE, SUBSEQUENT ENCOUNTER: ICD-10-CM

## 2021-06-30 DIAGNOSIS — Z00.00 MEDICARE ANNUAL WELLNESS VISIT, SUBSEQUENT: ICD-10-CM

## 2021-06-30 DIAGNOSIS — I10 ESSENTIAL HYPERTENSION: ICD-10-CM

## 2021-06-30 DIAGNOSIS — D50.0 IRON DEFICIENCY ANEMIA DUE TO CHRONIC BLOOD LOSS: ICD-10-CM

## 2021-06-30 DIAGNOSIS — R56.9 SEIZURES (HCC): ICD-10-CM

## 2021-06-30 DIAGNOSIS — Z13.31 SCREENING FOR DEPRESSION: ICD-10-CM

## 2021-06-30 DIAGNOSIS — E78.00 HYPERCHOLESTEREMIA: Primary | ICD-10-CM

## 2021-06-30 PROCEDURE — G0463 HOSPITAL OUTPT CLINIC VISIT: HCPCS | Performed by: INTERNAL MEDICINE

## 2021-06-30 PROCEDURE — 99214 OFFICE O/P EST MOD 30 MIN: CPT | Performed by: INTERNAL MEDICINE

## 2021-06-30 PROCEDURE — G0439 PPPS, SUBSEQ VISIT: HCPCS | Performed by: INTERNAL MEDICINE

## 2021-06-30 PROCEDURE — G8536 NO DOC ELDER MAL SCRN: HCPCS | Performed by: INTERNAL MEDICINE

## 2021-06-30 PROCEDURE — 1090F PRES/ABSN URINE INCON ASSESS: CPT | Performed by: INTERNAL MEDICINE

## 2021-06-30 PROCEDURE — G8419 CALC BMI OUT NRM PARAM NOF/U: HCPCS | Performed by: INTERNAL MEDICINE

## 2021-06-30 PROCEDURE — 1101F PT FALLS ASSESS-DOCD LE1/YR: CPT | Performed by: INTERNAL MEDICINE

## 2021-06-30 PROCEDURE — G8427 DOCREV CUR MEDS BY ELIG CLIN: HCPCS | Performed by: INTERNAL MEDICINE

## 2021-06-30 PROCEDURE — G8510 SCR DEP NEG, NO PLAN REQD: HCPCS | Performed by: INTERNAL MEDICINE

## 2021-06-30 RX ORDER — AMLODIPINE BESYLATE 5 MG/1
TABLET ORAL
Qty: 90 TABLET | Refills: 1 | Status: SHIPPED | OUTPATIENT
Start: 2021-06-30 | End: 2022-01-06

## 2021-06-30 NOTE — PROGRESS NOTES
Room: 7    Identified pt with two pt identifiers(name and ). Reviewed record in preparation for visit and have obtained necessary documentation. All patient medications has been reviewed. Chief Complaint   Patient presents with    Medication Refill       Health Maintenance Due   Topic    Shingrix Vaccine Age 50> (1 of 2)    Medicare Yearly Exam        Vitals:    21 1632   BP: 125/67   Pulse: 76   Resp: 16   Temp: 97 °F (36.1 °C)   TempSrc: Temporal   SpO2: 94%   Weight: 120 lb 3.2 oz (54.5 kg)   Height: 4' 8.69\" (1.44 m)   PainSc:   0 - No pain       4. Have you been to the ER, urgent care clinic since your last visit? Hospitalized since your last visit? No    5. Have you seen or consulted any other health care providers outside of the [address] [place] [address] since your last visit? Include any pap smears or colon screening. No      Patient is accompanied by daughter in law  I have received verbal consent from Ariadna Le to discuss any/all medical information while they are present in the room.

## 2021-06-30 NOTE — PATIENT INSTRUCTIONS

## 2021-06-30 NOTE — PROGRESS NOTES
This is the Subsequent Medicare Annual Wellness Exam, performed 12 months or more after the Initial AWV or the last Subsequent AWV    I have reviewed the patient's medical history in detail and updated the computerized patient record. Assessment/Plan   Education and counseling provided:  Are appropriate based on today's review and evaluation    1. Medicare annual wellness visit, subsequent  2. Screening for depression  -     DEPRESSION SCREEN ANNUAL       Depression Risk Factor Screening     3 most recent PHQ Screens 6/30/2021   Little interest or pleasure in doing things Several days   Feeling down, depressed, irritable, or hopeless Several days   Total Score PHQ 2 2       Alcohol Risk Screen    Do you average more than 1 drink per night or more than 7 drinks a week: On any one occasion in the past three months have you have had more than 3 drinks containing alcohol:          Functional Ability and Level of Safety    Hearing: The patient wears hearing aids. Activities of Daily Living: The home contains: handrails and grab bars  Patient needs help with:  transportation, shopping and managing medications      Ambulation: with difficulty, uses a cane and walker     Fall Risk:  Fall Risk Assessment, last 12 mths 6/30/2021   Able to walk? Yes   Fall in past 12 months? 0   Do you feel unsteady? 1   Are you worried about falling (No Data)   Is TUG test greater than 12 seconds? 1   Is the gait abnormal? 0   Number of falls in past 12 months 0   Fall with injury? -      Abuse Screen:  Patient is not abused       Cognitive Screening    Has your family/caregiver stated any concerns about your memory: no   Chief Complaint   Patient presents with    Medication Refill     Chief Complaint   Patient presents with    Medication Refill     Subjective:    Nanda Tyler is a 80 y.o. RH femal        Patient Active Problem List    Diagnosis    Pacemaker     Dual chamber pacer [employer] 12/17/2018      Sick sinus syndrome due to SA node dysfunction (HCC)     6 second pause with syncope noted on ILR       [facility]    C[facility]brovascular accident ([facility]A) [facility]    Stroke ([facility]    HTN (hypertension), benign    Diastolic dysfunction    Syncope and collapse    HEART (dyspnea on exertion)    Khadar lesion, chronic     Seen on EGD 5/7/13      Iron deficiency anemia    Osteoporosis     Fosamax  2005  No fractures  In all these years    Restarted 2016      Hypercholesteremia     Past Surgical History:   Procedure Laterality Date    ENDOSCOPY, COLON, DIAGNOSTIC  2008, 2013    HX CATARACT REMOVAL      HX GI  2013    egd   khadar ulcer    HX ORTHOPAEDIC  2010    knee replacement    IMPLANT  LOOP RECORDER  11/17/2016         [facility] CARDIAC SURG PROCEDURE UNLIST  2008    nuclear stress test normal     [facility] CARDIAC SURG PROCEDURE UNLIST  2018    pacemaker    [facility] INS NEW/RPLCMT PRM PM W/TRANSV ELTRD ATRIAL&VENT  12/17/2018         [facility] RMVL IMPLANTABLE PT-ACTIVATED CAR EVENT RECORDER  12/17/2018            Denies gerd    Vitals:    06/30/21 1632   BP: 125/67   Pulse: 76   Resp: 16   Temp: 97 °F (36.1 °C)   TempSrc: Temporal   SpO2: 94%   Weight: 120 lb 3.2 oz (54.5 kg)   Height: 4' 8.69\" (1.44 m)     Vitals:    06/30/21 1632   BP: 125/67   Pulse: 76   Resp: 16   Temp: 97 °F (36.1 °C)   TempSrc: Temporal   SpO2: 94%   Weight: 120 lb 3.2 oz (54.5 kg)   Height: 4' 8.69\" (1.44 m)       S1 and S2 normal, no murmurs, clicks, gallops or rubs. Regular rate and rhythm. Chest is clear; no wheezes or rales. No edema or JVD. Neuro intact  Uses a cane for ambulation support and balance balance has improved  doinf fine now no sz activity         hospitalized at [facility] 11/15-19/16 for episode of LOC at home resulting in a compression fracture at T7 and tongue trauma, with 2 prior episodes of unexplained LOC, the last in April and associated with tongue trauma at that time as well.  Additionally, MRI of the brain with tiny acute infarct in the right deep white matter. Exam revealed upgoing toe on the left, otherwise unremarkable. EEG was normal. Episodes of LOC with tongue trauma were concerning for seizure. No known seizure risk factors other than age. Family denied concerns about dementia. Recommended starting Keppra 250 mg po bid given high risk for injury with subsequent seizures. The stroke seen on MRI would not have been responsible for LOC or seizure. It is hard to know if the two are related. She was started on aspirin 81 mg a day and recommended continued good control of her stroke risk factors. She was also seen by Cardiology and discharged with a loop recorder. (negative)  was in rehab at [address] [place] [address] until 12/1/16. She has not had any subsequent episodes of LOC or unusual spells. She does not remember anything from her hospital stay and is repeating herself more. She is able to take her meds correctly everyday and is remembering her family and friends appropriately. Lives independently in a skilled nursing community.  Cooks for herself  Follow up doing well now wioth no pain she is tolerating the fosamax  So far       Past Surgical History:   Procedure Laterality Date    ENDOSCOPY, COLON, DIAGNOSTIC  2008, 2013    HX CATARACT REMOVAL      HX GI  2013    egd   kia ulcer    HX ORTHOPAEDIC  2010    knee replacement    IMPLANT  LOOP RECORDER  11/17/2016         DE CARDIAC SURG PROCEDURE UNLIST  2008    nuclear stress test normal     DE CARDIAC SURG PROCEDURE UNLIST  2018    pacemaker    DE INS NEW/RPLCMT PRM PM W/TRANSV ELTRD ATRIAL&VENT  12/17/2018         DE RMVL IMPLANTABLE PT-ACTIVATED CAR EVENT RECORDER  12/17/2018          Current Outpatient Medications   Medication Sig Dispense Refill    amLODIPine (NORVASC) 5 mg tablet TAKE ONE TABLET BY MOUTH EVERY DAY *DUE FOR OFFICE VISIT* 90 Tablet 1    Ventolin HFA 90 mcg/actuation inhaler INHALE 2 PUFFS BY MOUTH EVERY 4 HOURS AS NEEDED FOR WHEEZING OR SHORTNESS OF BREATH 18 g 10    omeprazole (PRILOSEC) 20 mg capsule TAKE ONE CAPSULE BY MOUTH EVERY DAY 30 MINUTES BEFORE A MEAL FOR STOMACH ACID 90 Cap 10    lisinopriL (PRINIVIL, ZESTRIL) 5 mg tablet TAKE 1 TABLET BY MOUTH EVERY DAY 90 Tab 0    levETIRAcetam (KEPPRA) 250 mg tablet TAKE ONE TABLET BY MOUTH 2 TIMES A  Tab 0    metoprolol tartrate (LOPRESSOR) 100 mg IR tablet Take 1 Tab by mouth two (2) times a day. 180 Tab 1    alendronate (FOSAMAX) 70 mg tablet TAKE 1 TABLET BY MOUTH ONCE EVERY 7 DAYS 12 Tab 0    atorvastatin (LIPITOR) 20 mg tablet TAKE ONE TABLET BY MOUTH EVERY DAY 90 Tab 10    vit C/E/zinc/lutein/zeaxanthin ([address] PO) Take  by mouth two (2) times a day.  cholecalciferol, vitamin D3, (VITAMIN D3) 2,000 unit tab Take  by mouth.  albuterol (PROAIR HFA) 90 mcg/actuation inhaler Take 2 Puffs by inhalation every four (4) hours as needed for Wheezing or Shortness of Breath. Ventolin brand requested (Patient taking differently: Take 2 Puffs by inhalation as needed for Shortness of Breath. Ventolin brand requested) 1 Inhaler 6    ferrous sulfate 325 mg (65 mg iron) tablet Take 1 Tab by mouth every fourty-eight (48) hours. Indications: Iron Deficiency Anemia (Patient taking differently: Take  by mouth every other day. Indications: anemia from inadequate iron) 100 Tab 6    acetaminophen (TYLENOL) 500 mg tablet Take 2 Tabs by mouth every six (6) hours. (Patient taking differently: Take 1,000 mg by mouth as needed.) 100 Tab 0    aspirin 81 mg chewable tablet Take 1 Tab by mouth daily. 30 Tab 0    CALCIUM CARBONATE (OYSTER SHELL CALCIUM 500 PO) Take 1 Tab by mouth daily.  latanoprost (XALATAN) 0.005 % ophthalmic solution Administer 1 Drop to both eyes nightly.  timolol (TIMOPTIC) 0.5 % ophthalmic solution Administer 1 Drop to right eye two (2) times a day.  tolterodine (DetroL) 1 mg tablet Take 1 Tab by mouth two (2) times a day.  (Patient not taking: Reported on 2021) 180 Tab 1    wheat dextrin (BENEFIBER SUGAR FREE, DEXTRIN,) 3 gram/3.8 gram powd Take  by mouth daily. (Patient not taking: Reported on 2021)       No Known Allergies  Family History   Problem Relation Age of Onset    Heart Disease Mother     Heart Disease Father     Heart Disease Sister     Alzheimer Sister     Alcohol abuse Brother      Social History     Tobacco Use    Smoking status: Former Smoker     Years: 30.00     Types: Cigarettes     Quit date: 2000     Years since quittin.7    Smokeless tobacco: Never Used   Substance Use Topics    Alcohol use: Yes     Alcohol/week: 6.0 standard drinks     Types: 6 Glasses of wine per week     Patient Active Problem List   Diagnosis Code    Osteoporosis M81.0    Hypercholesteremia E78.00    Iron deficiency anemia D50.9    Khadar lesion, chronic K25.7    HEART (dyspnea on exertion) R06.00    Syncope and collapse W67    Diastolic dysfunction N16.65    HTN (hypertension), benign I10    Stroke (HCC) I63.9    Cerebrovascular accident (CVA) ([facility] I63.9    Pacemaker Z95.0    Sick sinus syndrome due to SA node dysfunction (HCC) I49.5    PAF [facility]oxysmal [facility] fibrillation) ([facility] I48.0       Alcohol Risk Factor Screening:         Functional Ability and Level of Safety:     Hearing Loss   mild    Activities of Daily Living   Self-care. Requires assistance with: no ADLs    Fall Risk     Fall Risk Assessment, last 12 mths 2021   Able to walk? Yes   Fall in past 12 months? 0   Do you feel unsteady? 1   Are you worried about falling (No Data)   Is TUG test greater than 12 seconds? 1   Is the gait abnormal? 0   Number of falls in past 12 months 0   Fall with injury? -     Abuse Screen   Patient is not abused    Review of Systems   Pertinent items are noted in HPI.     Physical Examination     Evaluation of Cognitive Function:  Mood/affect:  happy  Appearance: age appropriate  Family member/caregiver input: family helps a lot  No longer driving episode left arm hand pain a few nights ago went away with rolling over    Left arm tingling  This morning  For 90 minutes today    Visit Vitals  /67 (BP 1 Location: Left upper arm, BP Patient Position: Sitting, BP Cuff Size: Adult)   Pulse 76   Temp 97 °F (36.1 °C) (Temporal)   Resp 16   Ht 4' 8.69\" (1.44 m)   Wt 120 lb 3.2 oz (54.5 kg)   SpO2 94%   BMI 26.29 kg/m²     General appearance: alert, cooperative, no distress, appears stated age  Lungs: clear to auscultation bilaterally  Heart: regular rate and rhythm, S1, S2 normal, no murmur, click, rub or gallop  Neurologic: Grossly normal  Saw neuro and note reviewed    Mobility better    . get up and go  quickly      Lab Results   Component Value Date/Time    WBC 7.5 07/22/2020 10:45 AM    HGB 14.6 07/22/2020 10:45 AM    Hemoglobin (POC) 11.6 07/10/2013 09:09 PM    HCT 44.6 07/22/2020 10:45 AM    Hematocrit (POC) 34 (L) 07/10/2013 09:09 PM    PLATELET 131 19/16/3379 10:45 AM    MCV 94.5 07/22/2020 10:45 AM     Lab Results   Component Value Date/Time    Cholesterol, total 171 07/22/2020 10:45 AM    HDL Cholesterol 97 07/22/2020 10:45 AM    LDL, calculated 58.4 07/22/2020 10:45 AM    Triglyceride 78 07/22/2020 10:45 AM    CHOL/HDL Ratio 1.8 07/22/2020 10:45 AM     Lab Results   Component Value Date/Time    ALT (SGPT) 23 07/22/2020 10:45 AM    Alk.  phosphatase 74 07/22/2020 10:45 AM    Bilirubin, direct 0.1 01/06/2010 09:58 AM    Bilirubin, total 0.9 07/22/2020 10:45 AM    Albumin 4.0 07/22/2020 10:45 AM    Protein, total 7.4 07/22/2020 10:45 AM    INR 1.0 11/15/2016 10:10 AM    Prothrombin time 10.3 11/15/2016 10:10 AM    PLATELET 451 79/86/2486 10:45 AM       Lab Results   Component Value Date/Time    GFR est non-AA >60 07/22/2020 10:45 AM    GFRNA, POC 39 (L) 07/10/2013 09:09 PM    GFR est AA >60 07/22/2020 10:45 AM    GFRAA, POC 48 (L) 07/10/2013 09:09 PM    Creatinine 0.84 07/22/2020 10:45 AM    Creatinine (POC) 1.3 07/10/2013 09:09 PM BUN 13 07/22/2020 10:45 AM    BUN (POC) 23 (H) 07/10/2013 09:09 PM    Sodium 136 07/22/2020 10:45 AM    Sodium (POC) 137 07/10/2013 09:09 PM    Potassium 4.6 07/22/2020 10:45 AM    Potassium (POC) 3.8 07/10/2013 09:09 PM    Chloride 102 07/22/2020 10:45 AM    Chloride (POC) 104 07/10/2013 09:09 PM    CO2 25 07/22/2020 10:45 AM    Magnesium 1.8 11/27/2016 04:20 AM    Phosphorus 4.2 11/27/2016 04:20 AM       Symptoms from this week discussed         Cognitive Screening: Normal - Verbal Fluency Test    Health Maintenance Due     Health Maintenance Due   Topic Date Due    Shingrix Vaccine Age 49> (1 of 2) Never done       Patient Care Team   Patient Care Team:  Rachel Mcbride MD as PCP - General  Rachel Mcbride MD as PCP - [address] [place] [address] Provider  Segundo Allison MD (Cardiology)  Luis Miguel Benitez MD as Physician (Neurology)    History     Patient Active Problem List   Diagnosis Code    Osteoporosis M81.0    Hypercholesteremia E78.00    Iron deficiency anemia D50.9    Khadar lesion, chronic K25.7    HEART (dyspnea on exertion) R06.00    Syncope and collapse Y29    Diastolic dysfunction V13.00    HTN (hypertension), benign I10    Stroke ([address] I63.9    Cerebrovascular accident (CVA) ([address] I63.9    Pacemaker Z95.0    Sick sinus syndrome due to SA node dysfunction ([address] I49.5    PAF (paroxysmal atrial fibrillation) ([address] I48.0     Past Medical History:   Diagnosis Date    Anemia     Arthritis     Khadar lesion, chronic 5/8/2013    Seen on EGD 5/7/13    Chronic obstructive pulmonary disease ([address]     CVA (cerebral vascular accident) ([address]     Tiny punctate infarct right frontal lobe, 10/2016.     HEART (dyspnea on exertion) 6/12/2015    GERD (gastroesophageal reflux disease)     Hypercholesterolemia     Hypertension 9/22/2010    Ill-defined condition     Osteoporosis 9/22/2010    Seizures ([address]     Sick sinus syndrome due to SA node dysfunction ([address] 12/17/2018      Past Surgical History: Procedure Laterality Date    ENDOSCOPY, COLON, DIAGNOSTIC  2008, 2013    HX CATARACT REMOVAL      HX GI  2013    egd   kia ulcer    HX ORTHOPAEDIC  2010    knee replacement    IMPLANT  LOOP RECORDER  11/17/2016         [facility] CARDI[facility] SUR[facility] PROCEDURE UNLIST  2008    nuclear stress test normal     [facility] CARDIAC SURG PROCEDURE UNLIST  2018    pacemaker    [facility] INS NEW/RPLCMT PRM PM W/TRANSV ELTRD ATRIAL&VENT  12/17/2018         [facility] RMVL IMPLANTABLE PT-ACTIVATED CAR EVENT RECORDER  12/17/2018          Current Outpatient Medications   Medication Sig Dispense Refill    Ventolin HFA 90 mcg/actuation inhaler INHALE 2 PUFFS BY MOUTH EVERY 4 HOURS AS NEEDED FOR WHEEZING OR SHORTNESS OF BREATH 18 g 10    amLODIPine (NORVASC) 5 mg tablet TAKE ONE TABLET BY MOUTH EVERY DAY *DUE FOR OFFICE VISIT* 30 Tab 0    omeprazole (PRILOSEC) 20 mg capsule TAKE ONE CAPSULE BY MOUTH EVERY DAY 30 MINUTES BEFORE A MEAL FOR STOMACH ACID 90 Cap 10    lisinopriL (PRINIVIL, ZESTRIL) 5 mg tablet TAKE 1 TABLET BY MOUTH EVERY DAY 90 Tab 0    levETIRAcetam (KEPPRA) 250 mg tablet TAKE ONE TABLET BY MOUTH 2 TIMES A  Tab 0    metoprolol tartrate (LOPRESSOR) 100 mg IR tablet Take 1 Tab by mouth two (2) times a day. 180 Tab 1    alendronate (FOSAMAX) 70 mg tablet TAKE 1 TABLET BY MOUTH ONCE EVERY 7 DAYS 12 Tab 0    atorvastatin (LIPITOR) 20 mg tablet TAKE ONE TABLET BY MOUTH EVERY DAY 90 Tab 10    vit C/E/zinc/lutein/zeaxanthin ([address] PO) Take  by mouth two (2) times a day.  cholecalciferol, vitamin D3, (VITAMIN D3) 2,000 unit tab Take  by mouth.  albuterol (PROAIR HFA) 90 mcg/actuation inhaler Take 2 Puffs by inhalation every four (4) hours as needed for Wheezing or Shortness of Breath. Ventolin brand requested (Patient taking differently: Take 2 Puffs by inhalation as needed for Shortness of Breath.  Ventolin brand requested) 1 Inhaler 6    ferrous sulfate 325 mg (65 mg iron) tablet Take 1 Tab by mouth every fourty-eight (48) hours. Indications: Iron Deficiency Anemia (Patient taking differently: Take  by mouth every other day. Indications: anemia from inadequate iron) 100 Tab 6    acetaminophen (TYLENOL) 500 mg tablet Take 2 Tabs by mouth every six (6) hours. (Patient taking differently: Take 1,000 mg by mouth as needed.) 100 Tab 0    aspirin 81 mg chewable tablet Take 1 Tab by mouth daily. 30 Tab 0    CALCIUM CARBONATE (OYSTER SHELL CALCIUM 500 PO) Take 1 Tab by mouth daily.  latanoprost (XALATAN) 0.005 % ophthalmic solution Administer 1 Drop to both eyes nightly.  timolol (TIMOPTIC) 0.5 % ophthalmic solution Administer 1 Drop to right eye two (2) times a day.  tolterodine (DetroL) 1 mg tablet Take 1 Tab by mouth two (2) times a day. (Patient not taking: Reported on 2021) 180 Tab 1    wheat dextrin (BENEFIBER SUGAR FREE, DEXTRIN,) 3 gram/3.8 gram powd Take  by mouth daily. (Patient not taking: Reported on 2021)       No Known Allergies    Family History   Problem Relation Age of Onset    Heart Disease Mother     Heart Disease Father     Heart Disease Sister     Alzheimer Sister     Alcohol abuse Brother      Social History     Tobacco Use    Smoking status: Former Smoker     Years: 30.00     Types: Cigarettes     Quit date: 2000     Years since quittin.7    Smokeless tobacco: Never Used   Substance Use Topics    Alcohol use: Yes     Alcohol/week: 6.0 standard drinks     Types: 6 Glasses of wine per week         Lexie Blanchard MD       1. Medicare annual wellness visit, subsequent  Will coordinate care with all the physicians and providers      2. Screening for depression  Stressed over [place] [address]    3. Hypercholesteremia  labs  - CBC WITH AUTOMATED DIFF; Future  - LIPID PANEL; Future  - METABOLIC PANEL, COMPREHENSIVE; Future    4. Iron deficiency anemia due to chronic blood loss  Prior on iron  - CBC WITH AUTOMATED DIFF; Future    5.  Osteoporosis with current pathological fracture with routine healing, unspecified osteoporosis type, subsequent encounter  Can stop fosaamx when finishes and frug holiday at 5 years    6. Sick sinus syndrome (HCC)  pacer    7.  Seizures ([address]  none  Vitals:    06/30/21 1632   BP: 125/67   Pulse: 76   Resp: 16   Temp: 97 °F (36.1 °C)   TempSrc: Temporal   SpO2: 94%   Weight: 120 lb 3.2 oz (54.5 kg)   Height: 4' 8.69\" (1.44 m)     Hypertension controlled for age based on guidelines

## 2021-07-07 RX ORDER — METOPROLOL TARTRATE 100 MG/1
TABLET ORAL
Qty: 180 TABLET | Refills: 1 | Status: SHIPPED | OUTPATIENT
Start: 2021-07-07 | End: 2022-01-03

## 2021-07-07 NOTE — TELEPHONE ENCOUNTER
Request for Lopressor 100 mg BID. Last office visit 10/27/20, next office visit 11/4/21. Refills per verbal order from Dr. Emil Santos.

## 2021-07-13 RX ORDER — LISINOPRIL 5 MG/1
TABLET ORAL
Qty: 90 TABLET | Refills: 1 | Status: SHIPPED | OUTPATIENT
Start: 2021-07-13 | End: 2022-01-06

## 2021-07-16 DIAGNOSIS — R56.9 SEIZURES (HCC): Primary | ICD-10-CM

## 2021-07-16 RX ORDER — LEVETIRACETAM 250 MG/1
TABLET ORAL
Qty: 180 TABLET | Refills: 0 | Status: SHIPPED | OUTPATIENT
Start: 2021-07-16 | End: 2021-07-16 | Stop reason: SDUPTHER

## 2021-07-16 RX ORDER — LEVETIRACETAM 250 MG/1
TABLET ORAL
Qty: 180 TABLET | Refills: 0 | Status: SHIPPED | OUTPATIENT
Start: 2021-07-16 | End: 2021-09-08 | Stop reason: SDUPTHER

## 2021-08-16 ENCOUNTER — OFFICE VISIT (OUTPATIENT)
Dept: CARDIOLOGY CLINIC | Age: 86
End: 2021-08-16
Payer: MEDICARE

## 2021-08-16 DIAGNOSIS — Z95.0 CARDIAC PACEMAKER IN SITU: Primary | ICD-10-CM

## 2021-08-16 PROCEDURE — 93296 REM INTERROG EVL PM/IDS: CPT | Performed by: INTERNAL MEDICINE

## 2021-08-16 NOTE — LETTER
8/16/2021 1:06 PM    Ms. Junior Velasquez  [address] [place] [id number]            This letter confirms that we have received your scheduled remote check of your implanted     device on 8-16-21  . Our EP team will contact you via phone if there are significant abnormal    findings. Your next in-clinic device check is scheduled for 11-4-21 at 10:20am  .                   If you have any questions, please call [address] [place] [address] Drive at [phone].                Sincerely,    Patricia Mccain MD [facility]

## 2021-08-16 NOTE — LETTER
2021 1:06 PM    Ms. Delvalle Young [address] [facility] [phone]        Dear Patient,    This letter is to remind you that as of  [facility] will no longer be sending out confirmation letters for remote transmissions through the [employer]. All letters in the future will be posted in an electronic medical records format therefore we highly encourage enrollment in [employer] patient's portal. Once enrolled you will have access to any letters we send as well as appointment information that can be found in your medical record. Our EP team would contact you via phone if there are significant abnormal findings so you can discuss with Dr. Bryan Mora further in office. Missed transmission letters will also be digitized in [employer] but we will continue to send those out through the mail as well. How to register for [employer]:    - Visit [employer]  - Click Create an Account  - Provide your name, address, and   - You will then be asked a short series of questions to verify your identity. This helps to ensure that no one else can access your information.   - If you have any further questions, please contact our office at [phone]. If you choose not to enroll in [employer] or do not have internet access, please kindly let device clinic know and we will accommodate your preference. We are grateful for your understanding and looking forward to this new, improved and more efficient way of communication.            Warm Regards,  [facility] Staff

## 2021-09-08 ENCOUNTER — OFFICE VISIT (OUTPATIENT)
Dept: NEUROLOGY | Age: 86
End: 2021-09-08
Payer: MEDICARE

## 2021-09-08 VITALS
BODY MASS INDEX: 26.24 KG/M2 | HEART RATE: 68 BPM | TEMPERATURE: 97.8 F | WEIGHT: 121.6 LBS | DIASTOLIC BLOOD PRESSURE: 82 MMHG | OXYGEN SATURATION: 97 % | SYSTOLIC BLOOD PRESSURE: 128 MMHG | HEIGHT: 57 IN | RESPIRATION RATE: 16 BRPM

## 2021-09-08 DIAGNOSIS — R56.9 SEIZURES (HCC): Primary | ICD-10-CM

## 2021-09-08 PROCEDURE — 1101F PT FALLS ASSESS-DOCD LE1/YR: CPT | Performed by: PSYCHIATRY & NEUROLOGY

## 2021-09-08 PROCEDURE — G8536 NO DOC ELDER MAL SCRN: HCPCS | Performed by: PSYCHIATRY & NEUROLOGY

## 2021-09-08 PROCEDURE — G8427 DOCREV CUR MEDS BY ELIG CLIN: HCPCS | Performed by: PSYCHIATRY & NEUROLOGY

## 2021-09-08 PROCEDURE — G8432 DEP SCR NOT DOC, RNG: HCPCS | Performed by: PSYCHIATRY & NEUROLOGY

## 2021-09-08 PROCEDURE — 99213 OFFICE O/P EST LOW 20 MIN: CPT | Performed by: PSYCHIATRY & NEUROLOGY

## 2021-09-08 PROCEDURE — G8419 CALC BMI OUT NRM PARAM NOF/U: HCPCS | Performed by: PSYCHIATRY & NEUROLOGY

## 2021-09-08 PROCEDURE — 1090F PRES/ABSN URINE INCON ASSESS: CPT | Performed by: PSYCHIATRY & NEUROLOGY

## 2021-09-08 RX ORDER — LEVETIRACETAM 250 MG/1
TABLET ORAL
Qty: 180 TABLET | Refills: 3 | Status: SHIPPED | OUTPATIENT
Start: 2021-09-08 | End: 2022-09-08 | Stop reason: SDUPTHER

## 2021-09-08 NOTE — PROGRESS NOTES
Chief Complaint   Patient presents with    Seizure     f/u none since last visit     Visit Vitals  /82 (BP 1 Location: Left arm, BP Patient Position: Sitting)   Pulse 68   Temp 97.8 °F (36.6 °C) (Oral)   Resp 16   Ht 4' 8.69\" (1.44 m)   Wt 121 lb 9.6 oz (55.2 kg)   SpO2 97%   BMI 26.60 kg/m²     1. Have you been to the ER, urgent care clinic since your last visit? Hospitalized since your last visit?no    2. Have you seen or consulted any other health care providers outside of the [address] [place] [address] since your last visit? Include any pap smears or colon screening.  See chart

## 2021-09-08 NOTE — LETTER
9/8/2021    Patient: Cloteal Furnace   YOB: 1933   Date of Visit: 9/8/2021     Harley Garcia MD  [facility], [place]  Via In Basket    Dear Harley Garcia MD,      Thank you for referring Ms. Nayeli Blackwell to [address] [place] [address],[phone] for evaluation. My notes for this consultation are attached. If you have questions, please do not hesitate to call me. I look forward to following your patient along with you.       Sincerely,    Donovan Marin MD

## 2021-09-08 NOTE — PROGRESS NOTES
Neurology Progress Note    HISTORY PROVIDED BY: patient and Son    Chief Complaint:   Chief Complaint   Patient presents with    Seizure     f/u none since last visit      Subjective:   Pt is an 80 y.o. RH female last seen in clinic on 7/31/19 in f/u for probable epilepsy, hospitalized at [address] [place] [address] 11/15-19/16 for episode of LOC at home resulting in a compression fracture at T7 and tongue trauma, with 2 prior episodes of unexplained LOC, the last in April, 2016 and associated with tongue trauma at that time as well. Additionally, MRI of the brain 11/15/16 with tiny acute infarct in the right deep white matter. EEG 11/15/16 was normal. Started on Keppra 250mg bid empirically for seizures without episode of LOC since and no side effects to med. At last visit, mentioned dreaming that she saw her \"\" who is actually her ex- by 30 years, on the floor of her room and called security before realizing that this was not real.  Exam with MMSE 30/30, o/w non-focal. Spell sounds most c/w hypnagogic hallucination, which occurring in isolation only one time is not a concern. No history suggestive of RBD. She will monitor and let me know if this continues to occur. Continued Keppra 250mg bid. I reassured the pt that I do not see signs of dementia. She returns for f/u. Since our last visit, she was seen in f/u by [employer], NP 9/1/20, she denied seizures and no changes were made to [address]. No seizures since last visit. No new medical issues over the last year. No new neurological issues. She is still living independently with assistance from son and DIL. Past Medical History:   Diagnosis Date    Anemia     Arthritis     Bethene Barters lesion, chronic 5/8/2013    Seen on EGD 5/7/13    Chronic obstructive pulmonary disease ([address]     CVA (cerebral vascular accident) ([address]     Tiny punctate infarct right frontal lobe, 10/2016.     HEART (dyspnea on exertion) 6/12/2015    GERD (gastroesophageal reflux disease)  Hypercholesterolemia     Hypertension 2010    Ill-defined condition     Osteoporosis 2010    [facility]     Sick sinus syndrome due to SA node dysfunction ([facility] 2018      Past Surgical History:   Procedure Laterality Date    ENDOSCOPY, COLON, DIAGNOSTIC  ,     HX CATARACT REMOVAL      HX GI      egd   kia ulcer    HX ORTHOPAEDIC  2010    knee replacement    IMPLANT  LOOP RECORDER  2016         [place] CARDIAC SURG PROCEDURE UNLIST      nuclear stress test normal     MN CARDIAC SURG PROCEDURE UNLIST      pacemaker    [place] INS NEW/RPLCMT PRM PM W/TRANSV ELTRD ATRIAL&VENT  2018         [place] RMVL IMPLANTABLE PT-ACTIVATED CAR EVENT RECORDER  2018           Social History     Socioeconomic History    Marital status: SINGLE     Spouse name: Not on file    Number of children: Not on file    Years of education: Not on file    Highest education level: Not on file   Occupational History    Not on file   Tobacco Use    Smoking status: Former Smoker     Years: 30.00     Types: Cigarettes     Quit date: 2000     Years since quittin.9    Smokeless tobacco: Never Used   Vaping Use    Vaping Use: Never used   Substance and Sexual Activity    Alcohol use: Yes     Alcohol/week: 6.0 standard drinks     Types: 6 Glasses of wine per week    Drug use: No    Sexual activity: Not Currently   Other Topics Concern    Not on file   Social History Narrative    Not on file     Social Determinants of Health     Financial Resource Strain:     Difficulty of Paying Living Expenses:    Food Insecurity:     Worried About Running Out of Food in the Last Year:     Ran Out of Food in the Last Year:    Transportation Needs:     Lack of Transportation (Medical):      Lack of Transportation (Non-Medical):    Physical Activity:     Days of Exercise per Week:     Minutes of Exercise per Session:    Stress:     Feeling of Stress :    Social Connections:     Frequency of Communication with Friends and Family:     Frequency of Social Gatherings with Friends and Family:     Attends Protestant Services:     Active Member of Clubs or Organizations:     Attends Club or Organization Meetings:     Marital Status:    Intimate Partner Violence:     Fear of Current or Ex-Partner:     Emotionally Abused:     Physically Abused:     Sexually Abused:      Family History   Problem Relation Age of Onset    Heart Disease Mother     Heart Disease Father     Heart Disease Sister     Alzheimer Sister     Alcohol abuse Brother           Objective:   ROS:  Per HPI o/w neg    No Known Allergies    Meds:  Outpatient Medications Prior to Visit   Medication Sig Dispense Refill    levETIRAcetam (KEPPRA) 250 mg tablet TAKE ONE TABLET BY MOUTH 2 TIMES A  Tablet 0    lisinopriL (PRINIVIL, ZESTRIL) 5 mg tablet TAKE 1 TABLET BY MOUTH EVERY DAY 90 Tablet 1    metoprolol tartrate (LOPRESSOR) 100 mg IR tablet TAKE ONE TABLET BY MOUTH 2 TIMES A  Tablet 1    atorvastatin (LIPITOR) 20 mg tablet TAKE ONE TABLET BY MOUTH EVERY DAY 90 Tablet 3    amLODIPine (NORVASC) 5 mg tablet TAKE ONE TABLET BY MOUTH EVERY DAY *DUE FOR OFFICE VISIT* 90 Tablet 1    omeprazole (PRILOSEC) 20 mg capsule TAKE ONE CAPSULE BY MOUTH EVERY DAY 30 MINUTES BEFORE A MEAL FOR STOMACH ACID 90 Cap 10    vit C/E/zinc/lutein/zeaxanthin ([address] PO) Take  by mouth two (2) times a day.  cholecalciferol, vitamin D3, (VITAMIN D3) 2,000 unit tab Take  by mouth.  albuterol (PROAIR HFA) 90 mcg/actuation inhaler Take 2 Puffs by inhalation every four (4) hours as needed for Wheezing or Shortness of Breath. Ventolin brand requested (Patient taking differently: Take 2 Puffs by inhalation as needed for Shortness of Breath. Ventolin brand requested) 1 Inhaler 6    ferrous sulfate 325 mg (65 mg iron) tablet Take 1 Tab by mouth every fourty-eight (48) hours.  Indications: Iron Deficiency Anemia (Patient taking differently: Take  by mouth every other day. Indications: anemia from inadequate iron) 100 Tab 6    acetaminophen (TYLENOL) 500 mg tablet Take 2 Tabs by mouth every six (6) hours. (Patient taking differently: Take 1,000 mg by mouth as needed.) 100 Tab 0    aspirin 81 mg chewable tablet Take 1 Tab by mouth daily. 30 Tab 0    CALCIUM CARBONATE (OYSTER SHELL CALCIUM 500 PO) Take 1 Tab by mouth daily.  latanoprost (XALATAN) 0.005 % ophthalmic solution Administer 1 Drop to both eyes nightly.  timolol (TIMOPTIC) 0.5 % ophthalmic solution Administer 1 Drop to right eye two (2) times a day.  Ventolin HFA 90 mcg/actuation inhaler INHALE 2 PUFFS BY MOUTH EVERY 4 HOURS AS NEEDED FOR WHEEZING OR SHORTNESS OF BREATH (Patient not taking: Reported on 9/8/2021) 18 g 10    tolterodine (DetroL) 1 mg tablet Take 1 Tab by mouth two (2) times a day. (Patient not taking: Reported on 6/30/2021) 180 Tab 1    wheat dextrin (BENEFIBER SUGAR FREE, DEXTRIN,) 3 gram/3.8 gram powd Take  by mouth daily. (Patient not taking: Reported on 6/30/2021)       No facility-administered medications prior to visit. Imaging:  MRI Results (most recent):  Results from Hospital Encounter encounter on 06/09/17    MRI [facility] SPINE WO CONT    Narrative  EXAM:  MRI [facility] SPINE WO CONT    INDICATION:  T/L-spine trauma, significant injury suspected, +/- localizing  signs. Low back pain    COMPARISON: 11/15/2016    TECHNIQUE: MR imaging of the thoracic spine was performed using the following  sequences: sagittal T1, T2, stir; axial T1, T2.    CONTRAST: None. FINDINGS:    Mild mid thoracic kyphosis is noted. There is no significant subluxation. . There  is a severe, chronic compression fracture at T7. There is an acute, severe  inferior and superior endplate compression fracture at T12. There is only  minimal bulging of the posterior inferior margin of the fracture.  No evidence of  extension into the pedicles or posterior elements is seen. . Mild osteophytic  bony endplate changes are noted at multiple levels. . No concerning signal  changes are seen in the visualized paraspinal soft tissues. The course, caliber, and signal intensity of the spinal cord are normal.    A large hiatal hernia is redemonstrated. Multiple minimal disc bulges and/or protrusions are noted. The largest of these  are a left paracentral disc protrusion at T5-T6 and a right paracentral disc  protrusion at T4-T5. Both of these partially efface the lateral recesses. Minimal bulging of the posterior inferior margin of the T12 fracture causes no  significant narrowing of the canal. There is no significant spinal canal  stenosis throughout the thoracic spine. Impression  IMPRESSION:    1. Acute, severe T12 compression fracture. Minimal bulging of the posterior  inferior margin of the fracture, however this does not cause significant spinal  canal stenosis. No evidence of extension of the fracture into the pedicles or  posterior elements. 2. Chronic T7 compression fracture. 3. Mild to mild/moderate thoracic spondylosis. 23X  829     CT Results (most recent):  Results from Hospital Encounter encounter on 12/07/18    CT HEAD WO CONT    Narrative  INDICATION:  AMS, fall    Examination: Unenhanced head CT. Axial images were obtained from skull base to  the vertex. CT dose reduction was achieved through use of a standardized protocol tailored  for this examination and are automatic exposure control for dose modulation dose  reduction    COMPARISON: 11/15/2016    FINDINGS: The ventricles and cisterns are normal in size and configuration for  age. There are mild periventricular white matter changes. While nonspecific  these are likely due to small vessel ischemic change. There is no evidence of  midline shift, mass lesion or mass effect. No evidence of acute bleed or acute  infarct. No depressed skull fractures. Impression  IMPRESSION:  1.  Chronic appearing white matter changes. No acute abnormality       Reviewed records in [place] [facility] [place] tab today    Lab Review   Results for orders placed or performed in visit on 07/13/21   CBC WITH AUTOMATED DIFF   Result Value Ref Range    WBC 7.4 3.6 - 11.0 K/uL    RBC 4.68 3.80 - 5.20 M/uL    HGB 14.6 11.5 - 16.0 g/dL    HCT 44.8 35.0 - 47.0 %    MCV 95.7 80.0 - 99.0 FL    MCH 31.2 26.0 - 34.0 PG    MCHC 32.6 30.0 - 36.5 g/dL    RDW 13.2 11.5 - 14.5 %    PLATELET 399 843 - 389 K/uL    MPV 11.0 8.9 - 12.9 FL    NRBC 0.0 0  WBC    ABSOLUTE NRBC 0.00 0.00 - 0.01 K/uL    NEUTROPHILS 63 32 - 75 %    LYMPHOCYTES 23 12 - 49 %    MONOCYTES 9 5 - 13 %    EOSINOPHILS 3 0 - 7 %    BASOPHILS 1 0 - 1 %    IMMATURE GRANULOCYTES 1 (H) 0.0 - 0.5 %    ABS. NEUTROPHILS 4.7 1.8 - 8.0 K/UL    ABS. LYMPHOCYTES 1.7 0.8 - 3.5 K/UL    ABS. MONOCYTES 0.7 0.0 - 1.0 K/UL    ABS. EOSINOPHILS 0.2 0.0 - 0.4 K/UL    ABS. BASOPHILS 0.1 0.0 - 0.1 K/UL    ABS. IMM. GRANS. 0.0 0.00 - 0.04 K/UL    DF AUTOMATED     LIPID PANEL   Result Value Ref Range    Cholesterol, total 189 <200 MG/DL    Triglyceride 102 <150 MG/DL    HDL Cholesterol 86 MG/DL    LDL, calculated 82.6 0 - 100 MG/DL    VLDL, calculated 20.4 MG/DL    CHOL/HDL Ratio 2.2 0.0 - 5.0     METABOLIC PANEL, COMPREHENSIVE   Result Value Ref Range    Sodium 138 136 - 145 mmol/L    Potassium 4.2 3.5 - 5.1 mmol/L    Chloride 106 97 - 108 mmol/L    CO2 27 21 - 32 mmol/L    Anion gap 5 5 - 15 mmol/L    Glucose 86 65 - 100 mg/dL    BUN 16 6 - 20 MG/DL    Creatinine 0.76 0.55 - 1.02 MG/DL    BUN/Creatinine ratio 21 (H) 12 - 20      GFR est AA >60 >60 ml/min/1.73m2    GFR est non-AA >60 >60 ml/min/1.73m2    Calcium 9.3 8.5 - 10.1 MG/DL    Bilirubin, total 0.9 0.2 - 1.0 MG/DL    ALT (SGPT) 25 12 - 78 U/L    AST (SGOT) 25 15 - 37 U/L    Alk.  phosphatase 83 45 - 117 U/L    Protein, total 7.2 6.4 - 8.2 g/dL    Albumin 4.0 3.5 - 5.0 g/dL    Globulin 3.2 2.0 - 4.0 g/dL    A-G Ratio 1.3 1.1 - 2.2 Exam:  Visit Vitals  /82 (BP 1 Location: Left arm, BP Patient Position: Sitting)   Pulse 68   Temp 97.8 °F (36.6 °C) (Oral)   Resp 16   Ht 4' 8.69\" (1.44 m)   Wt 121 lb 9.6 oz (55.2 kg)   SpO2 97%   BMI 26.60 kg/m²     General:  Alert, cooperative, no distress. Head:  Normocephalic, without obvious abnormality, atraumatic. Respiratory:  Heart:   Non labored breathing  Regular rate and rhythm, no murmurs   Neck:    Extremities: Warm, no cyanosis or edema. Pulses: 2+ radial pulses. Neurologic:  MS: Alert, speech intact. Language intact. Attention and fund of knowledge appropriate. Recent and remote memory intact.   Cranial Nerves:  II: visual fields VFF   II: pupils PERRL   II: optic disc    III,VII: ptosis none   III,IV,VI: extraocular muscles  EOMI, no nystagmus or diplopia   V: facial light touch sensation     VII: facial muscle function   symmetric   VIII: hearing Diminished with hearing aids in place   IX: soft palate elevation     XI: trapezius strength     XI: sternocleidomastoid strength    XII: tongue       Motor: 5/5 throughout, no PD, no tremors  Coordination: Intact FTN  Gait: Steady gait with cane  Reflexes: 2+ sym except left knee not assessed due to pain    Mini Mental State Exam 7/31/2019 7/5/2017 1/5/2017   What is the Year 1 1 1   What is the Season 1 1 1   What is the Date 1 1 1   What is the Day 1 1 1   What is the Month 1 1 1   Where are we State 1 1 1   Where are we Country 1 1 1   Where are we [place] 1 1 1   Where are we Floor 1 1 1   Name three objects, then ask the patient to say them 3 3 3   Serial sevens Subtract 7 from 100 in increments 5 5 5   Ask for the three objects repeated above 3 3 3   Name a pencil 1 1 1   Name a watch 1 1 1   Have the patient repeat this phrase \"No ifs, ands, or buts\" 1 1 1   Three stage command: Take the paper in your right hand 1 1 1   Fold the paper in half 1 1 1   Put the paper on the floor 1 1 1   Read and obey the following: CLOSE YOUR EYES 1 1 1   Have the patient write a sentence 1 1 1   Have the patient copy a figure 1 1 1   Mini Mental Score 30 30 30          Assessment/Plan   Pt is an 80 y.o. RH female with probable epilepsy, hospitalized at [facility] 11/15-19/16 for episode of LOC at home resulting in a compression fracture at T7 and tongue trauma, with 2 prior episodes of unexplained LOC, the last in April, 2016 and associated with tongue trauma at that time as well. Additionally, MRI of the brain 11/15/16 with tiny acute infarct in the right deep white matter. EEG 11/15/16 was normal. Started on Keppra 250mg bid empirically for seizures without episode of LOC since and no side effects to med. Exam is non-focal and unremarkable. Seizures well controlled. Continue Keppra 250mg bid. Follow-up in clinic in 1 year, instructed to call in the interim with any questions or concerns. ICD-10-CM ICD-9-CM    1. Seizures ([facility]  R56.9 780.39        Signed:   Sallie Schilder, MD  9/8/2021

## 2021-11-04 ENCOUNTER — CLINICAL SUPPORT (OUTPATIENT)
Dept: CARDIOLOGY CLINIC | Age: 86
End: 2021-11-04
Payer: MEDICARE

## 2021-11-04 ENCOUNTER — OFFICE VISIT (OUTPATIENT)
Dept: CARDIOLOGY CLINIC | Age: 86
End: 2021-11-04
Payer: MEDICARE

## 2021-11-04 VITALS
DIASTOLIC BLOOD PRESSURE: 78 MMHG | HEIGHT: 57 IN | SYSTOLIC BLOOD PRESSURE: 120 MMHG | RESPIRATION RATE: 12 BRPM | WEIGHT: 119.8 LBS | HEART RATE: 84 BPM | OXYGEN SATURATION: 92 % | BODY MASS INDEX: 25.85 KG/M2

## 2021-11-04 DIAGNOSIS — Z86.73 HX OF COMPLETED STROKE: ICD-10-CM

## 2021-11-04 DIAGNOSIS — I47.1 PAT (PAROXYSMAL ATRIAL TACHYCARDIA) (HCC): ICD-10-CM

## 2021-11-04 DIAGNOSIS — I10 HTN (HYPERTENSION), BENIGN: ICD-10-CM

## 2021-11-04 DIAGNOSIS — I48.92 ATRIAL FLUTTER, PAROXYSMAL (HCC): ICD-10-CM

## 2021-11-04 DIAGNOSIS — Z95.0 CARDIAC PACEMAKER IN SITU: Primary | ICD-10-CM

## 2021-11-04 DIAGNOSIS — I48.0 PAF (PAROXYSMAL ATRIAL FIBRILLATION) (HCC): ICD-10-CM

## 2021-11-04 DIAGNOSIS — I49.5 SICK SINUS SYNDROME (HCC): ICD-10-CM

## 2021-11-04 DIAGNOSIS — I47.29 NSVT (NONSUSTAINED VENTRICULAR TACHYCARDIA): ICD-10-CM

## 2021-11-04 PROCEDURE — 93280 PM DEVICE PROGR EVAL DUAL: CPT | Performed by: INTERNAL MEDICINE

## 2021-11-04 PROCEDURE — G8432 DEP SCR NOT DOC, RNG: HCPCS | Performed by: INTERNAL MEDICINE

## 2021-11-04 PROCEDURE — 1090F PRES/ABSN URINE INCON ASSESS: CPT | Performed by: INTERNAL MEDICINE

## 2021-11-04 PROCEDURE — G8419 CALC BMI OUT NRM PARAM NOF/U: HCPCS | Performed by: INTERNAL MEDICINE

## 2021-11-04 PROCEDURE — G8536 NO DOC ELDER MAL SCRN: HCPCS | Performed by: INTERNAL MEDICINE

## 2021-11-04 PROCEDURE — G0463 HOSPITAL OUTPT CLINIC VISIT: HCPCS | Performed by: INTERNAL MEDICINE

## 2021-11-04 PROCEDURE — 99214 OFFICE O/P EST MOD 30 MIN: CPT | Performed by: INTERNAL MEDICINE

## 2021-11-04 PROCEDURE — G8427 DOCREV CUR MEDS BY ELIG CLIN: HCPCS | Performed by: INTERNAL MEDICINE

## 2021-11-04 PROCEDURE — 1101F PT FALLS ASSESS-DOCD LE1/YR: CPT | Performed by: INTERNAL MEDICINE

## 2021-11-04 NOTE — PROGRESS NOTES
Chief Complaint   Patient presents with    Follow-up     yearly. Jai chest pain/shortness of breath/dizziness. Occasional swelling/gastric reflux.      Visit Vitals  /78 (BP 1 Location: Left upper arm, BP Patient Position: Sitting, BP Cuff Size: Adult)   Pulse 84   Resp 12   Ht 4' 8.6\" (1.438 m)   Wt 119 lb 12.8 oz (54.3 kg)   SpO2 92%   BMI 26.29 kg/m²

## 2021-11-04 NOTE — PROGRESS NOTES
Cardiac Electrophysiology Office Note     Subjective: Petrona Meredith is a 80 y.o. patient who is seen for follow up on pacemaker  Her son is with her  She has arthritis bad to the point she cannot use her hands and in pain with tylenol  She also complains about incontinence  No UTI or fever  bp is usually higher in medical office  No fall but need cane    She had syncope with 6 second pause  In the past so pacer was implanted  ILR recorder showed 30 seconds of atrial flutter for 30 second on February 23, 2017   She has dual chamber pacer and is using atrial pacing    When I saw her in the past at [facility]:  She was seen for evaluation of syncope kindly referred by Dr. Dillon English.    PMHx includes chronic anemia, hypertension.    The patient reports she has had a total of 4 episodes of syncope, most recent one was yesterday, March 2016, 2010 and 2008.      She came to [facility], MRI of head revealed suspect small acute posterior frontal centrum semiovale lacunar infarct. MRA of neck- normal  Echo shows normal LVEF, no RWMA. Mild TR and pulmonic valve regurgitation. Xray of spine showed T7 compression fracture of unknown age.       Prior to this she had a similar episode in March of this year, she was getting ready for bed and while walking to the bed she collapsed and lacerated her lip on the bookcase, this did require a hospital visit and sutures. Episode in 2010, similar except she did have associated dizziness prior to fainting. She went to [address] [place] [address] at this time and was told she had a TIA. Episode in 2008 as well.    She denies hx of MI/DVT/PE/DM. She is very independent at home. No issues ambulating.    No family hx of pacemaker.    Mother/father and brother CAD.      Problem List  Date Reviewed: 11/4/2021          Codes Class Noted    Pacemaker ICD-10-CM: Z95.0  ICD-9-CM: V45.01  12/17/2018    Overview Signed 12/17/2018 12:54 PM by Mavis Cabral MD     Dual chamber pacer [employer] 12/17/2018 Sick sinus syndrome due to SA node dysfunction [facility]) ICD-10-CM: I49.5  ICD-9-CM: 427.81  12/17/2018    Overview Signed 12/17/2018 12:55 PM by Sanam Judd MD     6 second pause with syncope noted on ILR              PAF (paroxysmal atrial fibrillation) ([facility] ICD-10-CM: I48.0  ICD-9-CM: 427.31  12/17/2018        Cerebrovascular accident (CVA) [facility]) ICD-10-CM: I63.9  ICD-9-CM: 434.91  8/14/2017        HTN (hypertension), benign ICD-10-CM: I10  ICD-9-CM: 401.1  3/17/3436        Diastolic dysfunction FZK-90-VK: I51.89  ICD-9-CM: 429.9  4/1/2016        Syncope and collapse ICD-10-CM: R55  ICD-9-CM: 780.2  3/31/2016        HEART (dyspnea on exertion) ICD-10-CM: R06.00  ICD-9-CM: 786.09  6/12/2015        Khadar lesion, chronic ICD-10-CM: K25.7  ICD-9-CM: 531.70  5/8/2013    Overview Signed 5/8/2013  5:52 PM by Kiana Manuel MD     Seen on EGD 5/7/13             Iron deficiency anemia ICD-10-CM: D50.9  ICD-9-CM: 280.9  9/18/2012        Osteoporosis ICD-10-CM: M81.0  ICD-9-CM: 733.00  9/22/2010    Overview Addendum 2/20/2019  2:58 PM by Kiana Manuel MD     Fosamax  2005  No fractures  In all these years    Restarted 2016             Hypercholesteremia ICD-10-CM: E78.00  ICD-9-CM: 272.0  9/22/2010              Current Outpatient Medications   Medication Sig Dispense Refill    levETIRAcetam (KEPPRA) 250 mg tablet TAKE ONE TABLET BY MOUTH 2 TIMES A  Tablet 3    lisinopriL (PRINIVIL, ZESTRIL) 5 mg tablet TAKE 1 TABLET BY MOUTH EVERY DAY 90 Tablet 1    metoprolol tartrate (LOPRESSOR) 100 mg IR tablet TAKE ONE TABLET BY MOUTH 2 TIMES A  Tablet 1    atorvastatin (LIPITOR) 20 mg tablet TAKE ONE TABLET BY MOUTH EVERY DAY 90 Tablet 3    amLODIPine (NORVASC) 5 mg tablet TAKE ONE TABLET BY MOUTH EVERY DAY *DUE FOR OFFICE VISIT* 90 Tablet 1    omeprazole (PRILOSEC) 20 mg capsule TAKE ONE CAPSULE BY MOUTH EVERY DAY 30 MINUTES BEFORE A MEAL FOR STOMACH ACID 90 Cap 10    vit C/E/zinc/lutein/zeaxanthin ([address] PO) Take  by mouth two (2) times a day.  cholecalciferol, vitamin D3, (VITAMIN D3) 2,000 unit tab Take  by mouth.  albuterol (PROAIR HFA) 90 mcg/actuation inhaler Take 2 Puffs by inhalation every four (4) hours as needed for Wheezing or Shortness of Breath. Ventolin brand requested (Patient taking differently: Take 2 Puffs by inhalation as needed for Shortness of Breath. Ventolin brand requested) 1 Inhaler 6    ferrous sulfate 325 mg (65 mg iron) tablet Take 1 Tab by mouth every fourty-eight (48) hours. Indications: Iron Deficiency Anemia (Patient taking differently: Take  by mouth every other day. Indications: anemia from inadequate iron) 100 Tab 6    acetaminophen (TYLENOL) 500 mg tablet Take 2 Tabs by mouth every six (6) hours. (Patient taking differently: Take 1,000 mg by mouth as needed.) 100 Tab 0    aspirin 81 mg chewable tablet Take 1 Tab by mouth daily. 30 Tab 0    CALCIUM CARBONATE (OYSTER SHELL CALCIUM 500 PO) Take 1 Tab by mouth daily.  latanoprost (XALATAN) 0.005 % ophthalmic solution Administer 1 Drop to both eyes nightly.  timolol (TIMOPTIC) 0.5 % ophthalmic solution Administer 1 Drop to right eye two (2) times a day. No Known Allergies  Past Medical History:   Diagnosis Date    Anemia     Arthritis     Khadar lesion, chronic 5/8/2013    Seen on EGD 5/7/13    Chronic obstructive pulmonary disease ([address]     CVA (cerebral vascular accident) ([address]     Tiny punctate infarct right frontal lobe, 10/2016.     HEART (dyspnea on exertion) 6/12/2015    GERD (gastroesophageal reflux disease)     Hypercholesterolemia     Hypertension 9/22/2010    Ill-defined condition     Osteoporosis 9/22/2010    Seizures ([address]     Sick sinus syndrome due to SA node dysfunction ([address] 12/17/2018     Past Surgical History:   Procedure Laterality Date    ENDOSCOPY, COLON, DIAGNOSTIC  2008, 2013    HX CATARACT REMOVAL      HX GI  2013    egd   khadar ulcer    HX ORTHOPAEDIC  2010    knee replacement    IMPLANT  LOOP RECORDER  2016         VT CARDIAC SURG PROCEDURE UNLIST      nuclear stress test normal     VT CARDIAC SURG PROCEDURE UNLIST      pacemaker    VT INS NEW/RPLCMT PRM PM W/TRANSV ELTRD ATRIAL&VENT  2018         VT RMVL IMPLANTABLE PT-ACTIVATED CAR EVENT RECORDER  2018          Family History   Problem Relation Age of Onset    Heart Disease Mother     Heart Disease Father     Heart Disease Sister     Alzheimer's Disease Sister     Alcohol abuse Brother      Social History     Tobacco Use    Smoking status: Former Smoker     Years: 30.00     Types: Cigarettes     Quit date: 2000     Years since quittin.1    Smokeless tobacco: Never Used   Substance Use Topics    Alcohol use: Yes     Alcohol/week: 6.0 standard drinks     Types: 6 Glasses of wine per week        Review of Systems: all other systems negative  Constitutional: Negative for fever, chills, weight loss, malaise/fatigue. HEENT: Negative for nosebleeds, vision changes. Respiratory: Negative for cough, hemoptysis  Cardiovascular: Negative for chest pain, palpitations, orthopnea, claudication, syncope, and PND. Gastrointestinal: Negative for nausea, vomiting, soft stools    Genitourinary: Negative for dysuria, and hematuria. + urinary incontinence  Musculoskeletal: Negative for myalgias, + arthralgia. Skin: Negative for rash. Heme: Does not bleed or bruise easily. Neurological: Negative for speech change and focal weakness     Objective:     Visit Vitals  /78 (BP 1 Location: Left upper arm, BP Patient Position: Sitting, BP Cuff Size: Adult)   Pulse 84   Resp 12   Ht 4' 8.6\" (1.438 m)   Wt 119 lb 12.8 oz (54.3 kg)   SpO2 92%   BMI 26.29 kg/m²       Physical Exam:   Constitutional: well-nourished. No respiratory distress. Head: Normocephalic and atraumatic. Eyes: Pupils are equal, round  ENT: hearing normal  Neck: supple. No JVD present. Cardiovascular: Normal rate, regular rhythm. Exam reveals no gallop and no friction rub. No murmur heard. Pulmonary/Chest: Effort normal and breath sounds normal. No wheezes. Abdominal: Soft, no tenderness. mild obesity  Musculoskeletal: trace ankle edema. Neurological: alert, oriented. walk in with a cane  Skin: Skin is warm and dry. Left sided pacemaker site healed  Psychiatric: normal mood and affect. Behavior is normal. Judgment and thought content normal.         Assessment/Plan:       ICD-10-CM ICD-9-CM    1. Cardiac pacemaker in situ  Z95.0 V45.01    2. PAT (paroxysmal atrial tachycardia) ([facility])  I47.1 427.0    3. Atrial flutter, paroxysmal ([facility])  I48.92 427.32    4. NSVT (nonsustained ventricular tachycardia) ([facility])  I47.2 427.1    5. HTN (hypertension), benign  I10 401.1    6. Sick sinus syndrome ([facility])  I49.5 427.81    7. Hx of completed stroke  Z86.73 V12.54    8. PAF (paroxysmal atrial fibrillation) ([facility])  I48.0 427.31       She has had PAF to 4-5 hrs on pacer but most of the time minutes or seconds  Last one Nov 2 for 1 hr  Asymptomatic  I talked about NOAC 2.5 mg bid instead of aspirin and she and he want to think more about it and call back. Pacemaker is working properly  bp normal    Future Appointments   Date Time Provider Ian Guerrier   12/29/2021  1:15 PM MD MARJ Limon BS AMB   2/14/2022  4:15 PM REMOTE1, JANIA AMES BS AMB   5/23/2022  2:30 PM REMOTE1, JANIA AMES BS AMB   8/29/2022 10:00 AM REMOTE1, JANIA AMES BS AMB   9/8/2022 11:40 AM MD POLY Uribe BS AMB   12/8/2022 10:20 AM PACEMAKER3, JANIA AMES BS AMB   12/8/2022 10:40 AM MD KWAKU Taylor BS AMB       Thank you for involving me in this patient's care and please call with further concerns or questions. Ivory Torres M.D.   Electrophysiology/Cardiology  [facility]  [address] [place], [address] [place] [address]  [phone]

## 2021-11-06 ENCOUNTER — PATIENT MESSAGE (OUTPATIENT)
Dept: INTERNAL MEDICINE CLINIC | Age: 86
End: 2021-11-06

## 2021-11-11 NOTE — TELEPHONE ENCOUNTER
From: Madhu Amato  To: Kelsi Ramírez MD  Sent: 11/6/2021 1:25 PM EDT  Subject: Moshe Hilliard,  Dr. Equilla Cranker has recommended that I replace aspirin with a small dose of Eliquis. 2.5 mg, twice daily. Could I get your opinion on this, please. ?     Thank you,  Muna Klein

## 2021-11-30 ENCOUNTER — OFFICE VISIT (OUTPATIENT)
Dept: INTERNAL MEDICINE CLINIC | Age: 86
End: 2021-11-30
Payer: MEDICARE

## 2021-11-30 VITALS
WEIGHT: 120.4 LBS | SYSTOLIC BLOOD PRESSURE: 148 MMHG | RESPIRATION RATE: 16 BRPM | HEIGHT: 57 IN | TEMPERATURE: 98.2 F | DIASTOLIC BLOOD PRESSURE: 79 MMHG | HEART RATE: 84 BPM | BODY MASS INDEX: 25.97 KG/M2 | OXYGEN SATURATION: 97 %

## 2021-11-30 DIAGNOSIS — R30.0 DYSURIA: Primary | ICD-10-CM

## 2021-11-30 LAB
BILIRUB UR QL STRIP: NEGATIVE
GLUCOSE UR-MCNC: NEGATIVE MG/DL
KETONES P FAST UR STRIP-MCNC: NEGATIVE MG/DL
PH UR STRIP: 5.5 [PH] (ref 4.6–8)
PROT UR QL STRIP: NORMAL
SP GR UR STRIP: 1.02 (ref 1–1.03)
UA UROBILINOGEN AMB POC: NORMAL (ref 0.2–1)
URINALYSIS CLARITY POC: NORMAL
URINALYSIS COLOR POC: NORMAL
URINE BLOOD POC: NORMAL
URINE LEUKOCYTES POC: NORMAL
URINE NITRITES POC: POSITIVE

## 2021-11-30 PROCEDURE — 99213 OFFICE O/P EST LOW 20 MIN: CPT | Performed by: INTERNAL MEDICINE

## 2021-11-30 PROCEDURE — G8536 NO DOC ELDER MAL SCRN: HCPCS | Performed by: INTERNAL MEDICINE

## 2021-11-30 PROCEDURE — G8427 DOCREV CUR MEDS BY ELIG CLIN: HCPCS | Performed by: INTERNAL MEDICINE

## 2021-11-30 PROCEDURE — 1101F PT FALLS ASSESS-DOCD LE1/YR: CPT | Performed by: INTERNAL MEDICINE

## 2021-11-30 PROCEDURE — G8510 SCR DEP NEG, NO PLAN REQD: HCPCS | Performed by: INTERNAL MEDICINE

## 2021-11-30 PROCEDURE — 81003 URINALYSIS AUTO W/O SCOPE: CPT | Performed by: INTERNAL MEDICINE

## 2021-11-30 PROCEDURE — 1090F PRES/ABSN URINE INCON ASSESS: CPT | Performed by: INTERNAL MEDICINE

## 2021-11-30 PROCEDURE — G0463 HOSPITAL OUTPT CLINIC VISIT: HCPCS | Performed by: INTERNAL MEDICINE

## 2021-11-30 PROCEDURE — G8419 CALC BMI OUT NRM PARAM NOF/U: HCPCS | Performed by: INTERNAL MEDICINE

## 2021-11-30 RX ORDER — NITROFURANTOIN 25; 75 MG/1; MG/1
100 CAPSULE ORAL 2 TIMES DAILY
Qty: 14 CAPSULE | Refills: 0 | Status: SHIPPED | OUTPATIENT
Start: 2021-11-30 | End: 2022-07-27

## 2021-11-30 NOTE — PROGRESS NOTES
SUBJECTIVE: Ajit Pinto is a 80 y.o. female who complains of urinary frequency, urgency and dysuria x 2 days, without flank pain, fever, chills, or abnormal vaginal discharge or bleeding. OBJECTIVE: Appears well, in no apparent distress. Vital signs are normal. The abdomen is soft without tenderness, guarding, mass, rebound or organomegaly. No CVA tenderness or inguinal adenopathy noted. Urine dipstick shows positive for WBC's and positive for nitrates. Micro exam: not done. Vitals:    11/30/21 1015   BP: (!) 148/79   Pulse: 84   Resp: 16   Temp: 98.2 °F (36.8 °C)   TempSrc: Temporal   SpO2: 97%   Weight: 120 lb 6.4 oz (54.6 kg)   Height: 4' 8.6\" (1.438 m)     The abdomen is soft without tenderness, guarding, mass, rebound or organomegaly. Bowel sounds are normal. No CVA tenderness or inguinal adenopathy noted. Results for orders placed or performed in visit on 11/30/21   AMB POC URINALYSIS DIP STICK AUTO W/O MICRO   Result Value Ref Range    Color (UA POC) Yeny     Clarity (UA POC) Cloudy     Glucose (UA POC) Negative Negative    Bilirubin (UA POC) Negative Negative    Ketones (UA POC) Negative Negative    Specific gravity (UA POC) 1.020 1.001 - 1.035    Blood (UA POC) 4+ Negative    pH (UA POC) 5.5 4.6 - 8.0    Protein (UA POC) Trace Negative    Urobilinogen (UA POC) 0.2 mg/dL 0.2 - 1    Nitrites (UA POC) Positive Negative    Leukocyte esterase (UA POC) 4+ Negative       ASSESSMENT: UTI uncomplicated without evidence of pyelonephritis    PLAN: Treatment per orders - also push fluids, may use Pyridium OTC prn. Call or return to clinic prn if these symptoms worsen or fail to improve as anticipated. Diagnoses and all orders for this visit:    1. Dysuria  -     AMB POC URINALYSIS DIP STICK AUTO W/O MICRO  -     CULTURE, URINE; Future    Other orders  -     nitrofurantoin, macrocrystal-monohydrate, (MACROBID) 100 mg capsule; Take 1 Capsule by mouth two (2) times a day.

## 2021-11-30 NOTE — PROGRESS NOTES
1. Have you been to the ER, urgent care clinic since your last visit? Hospitalized since your last visit? No    2. Have you seen or consulted any other health care providers outside of the [address] [place] [address] since your last visit? Include any pap smears or colon screening.  Yes        Chief Complaint   Patient presents with    Urinary Burning    Allergic Reaction     to stomach

## 2021-12-03 LAB
BACTERIA SPEC CULT: ABNORMAL
CC UR VC: ABNORMAL
SERVICE CMNT-IMP: ABNORMAL

## 2021-12-29 ENCOUNTER — OFFICE VISIT (OUTPATIENT)
Dept: INTERNAL MEDICINE CLINIC | Age: 86
End: 2021-12-29
Payer: MEDICARE

## 2021-12-29 VITALS
DIASTOLIC BLOOD PRESSURE: 68 MMHG | TEMPERATURE: 98.4 F | HEART RATE: 80 BPM | RESPIRATION RATE: 20 BRPM | SYSTOLIC BLOOD PRESSURE: 115 MMHG | BODY MASS INDEX: 27.54 KG/M2 | OXYGEN SATURATION: 97 % | WEIGHT: 119 LBS | HEIGHT: 55 IN

## 2021-12-29 DIAGNOSIS — I10 HTN (HYPERTENSION), BENIGN: Primary | ICD-10-CM

## 2021-12-29 DIAGNOSIS — I10 ESSENTIAL HYPERTENSION: ICD-10-CM

## 2021-12-29 DIAGNOSIS — I63.9 CEREBROVASCULAR ACCIDENT (CVA), UNSPECIFIED MECHANISM (HCC): ICD-10-CM

## 2021-12-29 DIAGNOSIS — M80.00XD OSTEOPOROSIS WITH CURRENT PATHOLOGICAL FRACTURE WITH ROUTINE HEALING, UNSPECIFIED OSTEOPOROSIS TYPE, SUBSEQUENT ENCOUNTER: ICD-10-CM

## 2021-12-29 DIAGNOSIS — E78.00 HYPERCHOLESTEREMIA: ICD-10-CM

## 2021-12-29 DIAGNOSIS — D50.0 IRON DEFICIENCY ANEMIA DUE TO CHRONIC BLOOD LOSS: ICD-10-CM

## 2021-12-29 DIAGNOSIS — R56.9 SEIZURES (HCC): ICD-10-CM

## 2021-12-29 DIAGNOSIS — I48.0 PAF (PAROXYSMAL ATRIAL FIBRILLATION) (HCC): ICD-10-CM

## 2021-12-29 PROCEDURE — G0463 HOSPITAL OUTPT CLINIC VISIT: HCPCS | Performed by: INTERNAL MEDICINE

## 2021-12-29 PROCEDURE — 1090F PRES/ABSN URINE INCON ASSESS: CPT | Performed by: INTERNAL MEDICINE

## 2021-12-29 PROCEDURE — G8536 NO DOC ELDER MAL SCRN: HCPCS | Performed by: INTERNAL MEDICINE

## 2021-12-29 PROCEDURE — 1101F PT FALLS ASSESS-DOCD LE1/YR: CPT | Performed by: INTERNAL MEDICINE

## 2021-12-29 PROCEDURE — 99214 OFFICE O/P EST MOD 30 MIN: CPT | Performed by: INTERNAL MEDICINE

## 2021-12-29 PROCEDURE — G8419 CALC BMI OUT NRM PARAM NOF/U: HCPCS | Performed by: INTERNAL MEDICINE

## 2021-12-29 PROCEDURE — G8510 SCR DEP NEG, NO PLAN REQD: HCPCS | Performed by: INTERNAL MEDICINE

## 2021-12-29 PROCEDURE — G8427 DOCREV CUR MEDS BY ELIG CLIN: HCPCS | Performed by: INTERNAL MEDICINE

## 2021-12-29 NOTE — PROGRESS NOTES
Chief Complaint   Patient presents with    Follow-up     1. Have you been to the ER, urgent care clinic since your last visit? Hospitalized since your last visit? No    2. Have you seen or consulted any other health care providers outside of the [address] [place] [address] since your last visit? Include any pap smears or colon screening.  No    Visit Vitals  /68   Pulse 80   Temp 98.4 °F (36.9 °C) (Temporal)   Resp 20   Ht 4' 7\" (1.397 m)   Wt 119 lb (54 kg)   SpO2 97%   BMI 27.66 kg/m²

## 2021-12-29 NOTE — PROGRESS NOTES
Subjective: Kaya Romano is a 80 y.o. female  here for follow up of chronic medical conditions listed   Patient Active Problem List   Diagnosis Code    Osteoporosis M81.0    Hypercholesteremia E78.00    Iron deficiency anemia D50.9    Khadar lesion, chronic K25.7    HEART (dyspnea on exertion) R06.00    Syncope and collapse O18    Diastolic dysfunction E41.67    HTN (hypertension), benign I10    Cerebrovascular accident (CVA) ([facility] I63.9    Pacemaker Z95.0    Sick sinus syndrome due to SA node dysfunction (HCC) I49.5    PAF (paroxysmal atrial fibrillation) (HCC) I48.0     Current Outpatient Medications   Medication Sig Dispense Refill    nitrofurantoin, macrocrystal-monohydrate, (MACROBID) 100 mg capsule Take 1 Capsule by mouth two (2) times a day. 14 Capsule 0    apixaban (ELIQUIS) 2.5 mg tablet Take 1 Tablet by mouth two (2) times a day. 60 Tablet 5    levETIRAcetam (KEPPRA) 250 mg tablet TAKE ONE TABLET BY MOUTH 2 TIMES A  Tablet 3    lisinopriL (PRINIVIL, ZESTRIL) 5 mg tablet TAKE 1 TABLET BY MOUTH EVERY DAY 90 Tablet 1    metoprolol tartrate (LOPRESSOR) 100 mg IR tablet TAKE ONE TABLET BY MOUTH 2 TIMES A  Tablet 1    atorvastatin (LIPITOR) 20 mg tablet TAKE ONE TABLET BY MOUTH EVERY DAY 90 Tablet 3    amLODIPine (NORVASC) 5 mg tablet TAKE ONE TABLET BY MOUTH EVERY DAY *DUE FOR OFFICE VISIT* 90 Tablet 1    omeprazole (PRILOSEC) 20 mg capsule TAKE ONE CAPSULE BY MOUTH EVERY DAY 30 MINUTES BEFORE A MEAL FOR STOMACH ACID 90 Cap 10    vit C/E/zinc/lutein/zeaxanthin ([facility] PO) Take  by mouth two (2) times a day.  cholecalciferol, vitamin D3, (VITAMIN D3) 2,000 unit tab Take  by mouth.  albuterol (PROAIR HFA) 90 mcg/actuation inhaler Take 2 Puffs by inhalation every four (4) hours as needed for Wheezing or Shortness of Breath. Ventolin brand requested (Patient taking differently: Take 2 Puffs by inhalation as needed for Shortness of Breath.  Ventolin brand requested) 1 Inhaler 6    ferrous sulfate 325 mg (65 mg iron) tablet Take 1 Tab by mouth every fourty-eight (48) hours. Indications: Iron Deficiency Anemia (Patient taking differently: Take  by mouth every other day. Indications: anemia from inadequate iron) 100 Tab 6    acetaminophen (TYLENOL) 500 mg tablet Take 2 Tabs by mouth every six (6) hours. (Patient taking differently: Take 1,000 mg by mouth as needed.) 100 Tab 0    aspirin 81 mg chewable tablet Take 1 Tab by mouth daily. 30 Tab 0    CALCIUM CARBONATE (OYSTER SHELL CALCIUM 500 PO) Take 1 Tab by mouth daily.  latanoprost (XALATAN) 0.005 % ophthalmic solution Administer 1 Drop to both eyes nightly.  timolol (TIMOPTIC) 0.5 % ophthalmic solution Administer 1 Drop to right eye two (2) times a day. Past Medical History:   Diagnosis Date    Anemia     Arthritis     Uvaldo Single lesion, chronic 5/8/2013    Seen on EGD 5/7/13    Chronic obstructive pulmonary disease ([address]     CVA (cerebral vascular accident) ([address]     Tiny punctate infarct right frontal lobe, 10/2016.  HEART (dyspnea on exertion) 6/12/2015    GERD (gastroesophageal reflux disease)     Hypercholesterolemia     Hypertension 9/22/2010    Ill-defined condition     Osteoporosis 9/22/2010    Seizures ([address]     Sick sinus syndrome due to SA node dysfunction ([address] 12/17/2018     Past Surgical History:   Procedure Laterality Date    ENDOSCOPY, COLON, DIAGNOSTIC  2008, 2013    HX CATARACT REMOVAL      HX GI  2013    egd   kia ulcer    HX ORTHOPAEDIC  2010    knee replacement    IMPLANT  LOOP RECORDER  11/17/2016         NV CARDIAC SURG PROCEDURE UNLIST  2008    nuclear stress test normal     NV CARDIAC SURG PROCEDURE UNLIST  2018    pacemaker    NV INS NEW/RPLCMT PRM PM W/TRANSV ELTRD ATRIAL&VENT  12/17/2018         [place] RMVL IMPLANTABLE PT-ACTIVATED CAR EVENT RECORDER  12/17/2018        . She  is accompanied by patient and guardian.  She lives as follows: alone and does have Advanced Directives. New Complaints today include: Follow-up       Recent History includes: hospital stay: no and short term SNF stay: no    Review of Systems  Constitutional: positive for fatigue  Ears, nose, mouth, throat, and face: negative  Respiratory: negative  Cardiovascular: negative  Gastrointestinal: negative  Genitourinary:negative  Integument/breast: negative  Hematologic/lymphatic: negative  Musculoskeletal:positive for myalgias and arthralgias  Neurological: negative  Behavioral/Psych: negative  Endocrine: negative  Allergic/Immunologic: negative  Poor hearing  Geriatric Issues: Activities of Daily Living (ADLs):    She is independent in the following: ambulation, bathing and hygiene, feeding, continence, grooming, toileting and dressing  Requires assistance with the following: driving    Home safety, Cognative status:  normal and urinary incontinence with urinary tract infection    Outside reports reviewed: office notes.     Patient Active Problem List    Diagnosis Date Noted    Pacemaker 12/17/2018    Sick sinus syndrome due to SA node dysfunction ([facility] 12/17/2018    PAF (paroxysmal atrial fibrillation) ([facility] 12/17/2018    Cerebrovascular accident (CVA) ([facility] 08/14/2017    HTN (hypertension), benign 25/59/8867    Diastolic dysfunction 01/80/9018    Syncope and collapse 03/31/2016    HEART (dyspnea on exertion) 06/12/2015   Lizzy Pour lesion, chronic 05/08/2013    Iron deficiency anemia 09/18/2012    Osteoporosis 09/22/2010    Hypercholesteremia 09/22/2010          Objective:     Visit Vitals  /68   Pulse 80   Temp 98.4 °F (36.9 °C) (Temporal)   Resp 20   Ht 4' 7\" (1.397 m)   Wt 119 lb (54 kg)   SpO2 97%   BMI 27.66 kg/m²     Visit Vitals  /68   Pulse 80   Temp 98.4 °F (36.9 °C) (Temporal)   Resp 20   Ht 4' 7\" (1.397 m)   Wt 119 lb (54 kg)   SpO2 97%   BMI 27.66 kg/m²     General appearance: alert, cooperative, no distress, appears stated age  Neck: supple, symmetrical, trachea midline, no adenopathy, thyroid: not enlarged, symmetric, no tenderness/mass/nodules, no carotid bruit and no JVD  Lungs: clear to auscultation bilaterally  Heart: regular rate and rhythm, S1, S2 normal, no murmur, click, rub or gallop  Abdomen: soft, non-tender. Bowel sounds normal. No masses,  no organomegaly    Imaging      Lab Review  Lab Results   Component Value Date/Time    WBC 7.4 07/13/2021 09:25 AM    HGB 14.6 07/13/2021 09:25 AM    Hemoglobin (POC) 11.6 07/10/2013 09:09 PM    HCT 44.8 07/13/2021 09:25 AM    Hematocrit (POC) 34 (L) 07/10/2013 09:09 PM    PLATELET 009 32/93/0314 09:25 AM    MCV 95.7 07/13/2021 09:25 AM     Lab Results   Component Value Date/Time    Cholesterol, total 189 07/13/2021 09:25 AM    HDL Cholesterol 86 07/13/2021 09:25 AM    LDL, calculated 82.6 07/13/2021 09:25 AM    Triglyceride 102 07/13/2021 09:25 AM    CHOL/HDL Ratio 2.2 07/13/2021 09:25 AM     Lab Results   Component Value Date/Time    GFR est non-AA >60 07/13/2021 09:25 AM    GFRNA, POC 39 (L) 07/10/2013 09:09 PM    GFR est AA >60 07/13/2021 09:25 AM    GFRAA, POC 48 (L) 07/10/2013 09:09 PM    Creatinine 0.76 07/13/2021 09:25 AM    Creatinine (POC) 1.3 07/10/2013 09:09 PM    BUN 16 07/13/2021 09:25 AM    BUN (POC) 23 (H) 07/10/2013 09:09 PM    Sodium 138 07/13/2021 09:25 AM    Sodium (POC) 137 07/10/2013 09:09 PM    Potassium 4.2 07/13/2021 09:25 AM    Potassium (POC) 3.8 07/10/2013 09:09 PM    Chloride 106 07/13/2021 09:25 AM    Chloride (POC) 104 07/10/2013 09:09 PM    CO2 27 07/13/2021 09:25 AM    Magnesium 1.8 11/27/2016 04:20 AM    Phosphorus 4.2 11/27/2016 04:20 AM        Assessment/Plan:       1. HTN (hypertension), benign  stable    2. Hypercholesteremia  stable    3. Cerebrovascular accident (CVA), unspecified mechanism ([facility]  Old bno residual    4. Iron deficiency anemia due to chronic blood loss  On supplement    5. Essential hypertension  controlled    6.  Osteoporosis with current pathological fracture with routine healing, unspecified osteoporosis type, subsequent encounter  No falls no breaks    7. Seizures ([facility]  On keppra    8.  PAF (paroxysmal atrial fibrillation) ([facility]  Not often  Prolonged visit with 15 minutes of time out  of more than a  25 minute visit spent counseling patient and family and formulation of care

## 2022-01-03 RX ORDER — METOPROLOL TARTRATE 100 MG/1
TABLET ORAL
Qty: 180 TABLET | Refills: 1 | Status: SHIPPED | OUTPATIENT
Start: 2022-01-03 | End: 2022-06-27

## 2022-01-03 NOTE — TELEPHONE ENCOUNTER
Received refill request for metoprolol 100 mg po tabs. Refill authorized.     Future Appointments   Date Time Provider Ian Guerrier   2/14/2022  4:15 PM Suresh AMES BS AMB   5/23/2022  2:30 PM REMOTE1, JANIA AMES BS AMB   6/29/2022  1:15 PM MD MARJ Gilmore BS AMB   8/29/2022 10:00 AM REMOTE1, JANIA AMES BS AMB   9/8/2022 11:40 AM MD POLY Gloria BS AMB   12/8/2022 10:20 AM PACEMAKER3, JANIA AMES BS AMB   12/8/2022 10:40 AM MD KWAKU Martin BS AMB

## 2022-01-06 RX ORDER — APIXABAN 2.5 MG/1
TABLET, FILM COATED ORAL
Qty: 180 TABLET | Refills: 0 | Status: SHIPPED | OUTPATIENT
Start: 2022-01-06 | End: 2022-03-07 | Stop reason: SINTOL

## 2022-01-06 RX ORDER — LISINOPRIL 5 MG/1
TABLET ORAL
Qty: 90 TABLET | Refills: 1 | Status: SHIPPED | OUTPATIENT
Start: 2022-01-06 | End: 2022-06-27

## 2022-01-06 RX ORDER — AMLODIPINE BESYLATE 5 MG/1
TABLET ORAL
Qty: 90 TABLET | Refills: 1 | Status: SHIPPED | OUTPATIENT
Start: 2022-01-06 | End: 2022-06-27

## 2022-02-14 ENCOUNTER — TELEPHONE (OUTPATIENT)
Dept: CARDIOLOGY CLINIC | Age: 87
End: 2022-02-14

## 2022-02-15 NOTE — TELEPHONE ENCOUNTER
Spoke to patient's son Santa Gallegos. They got a new cell adapter for the monitor. Patient tried sending a transmission twice to verify it's working but nothing has been received since 8-15 (last scheduled remote). Advised son to try to unplug and replug in the monitor and try sending again and letting him know he will get a large checkmark if it goes through. Also provided him with MDT troubleshooting number [phone] in case the transmission doesn't work. He had no further questions or concerns.

## 2022-02-15 NOTE — TELEPHONE ENCOUNTER
Patient received an upgrade on her device. She continues to receive assistance and brief explanation. Patient's son Theressa Mortimer called back inquiring whether the new device is functioning properly.           LJN:[phone]

## 2022-02-28 ENCOUNTER — OFFICE VISIT (OUTPATIENT)
Dept: CARDIOLOGY CLINIC | Age: 87
End: 2022-02-28
Payer: MEDICARE

## 2022-02-28 ENCOUNTER — TELEPHONE (OUTPATIENT)
Dept: CARDIOLOGY CLINIC | Age: 87
End: 2022-02-28

## 2022-02-28 DIAGNOSIS — Z95.0 CARDIAC PACEMAKER IN SITU: Primary | ICD-10-CM

## 2022-02-28 PROCEDURE — 93296 REM INTERROG EVL PM/IDS: CPT | Performed by: INTERNAL MEDICINE

## 2022-02-28 NOTE — TELEPHONE ENCOUNTER
Patient said she is calling to let us know that her battery came from [facility]. her son attached the battery. She would like to know when should she do a manual transmission. Pt would like to know if she should do one in the morning or a night. Patient says she has caption calls on her telephone so she can be able to read the message. Please leave a message because she is a little hard off hearing.     [phone]

## 2022-02-28 NOTE — TELEPHONE ENCOUNTER
Called and spoke to patient. Can send a transmission whatever time is convenient. She was going for a walk and then is going to send. She had no further questions.

## 2022-02-28 NOTE — LETTER
2/28/2022 3:38 PM    Ms. Ben Holliday  [address] [place] [id number]            This letter confirms that we have received your scheduled remote check of your implanted     device on 2-28-22  . Our EP team will contact you via phone if there are significant abnormal    findings. Your next remote check from home is scheduled for 6-1-22  . If you have any questions, please call [address] [place] [address] at [phone].                Sincerely,    Jhonny Treviño MD [facility] [place]

## 2022-02-28 NOTE — TELEPHONE ENCOUNTER
Patient returned the call, attempted transfer was unsuccessful.             [facility]:[phone]

## 2022-03-01 NOTE — TELEPHONE ENCOUNTER
Transmission came over last night. Paired and connected. See scanned edit. .. Normal device functions.

## 2022-03-07 ENCOUNTER — TELEPHONE (OUTPATIENT)
Dept: INTERNAL MEDICINE CLINIC | Age: 87
End: 2022-03-07

## 2022-03-07 ENCOUNTER — OFFICE VISIT (OUTPATIENT)
Dept: INTERNAL MEDICINE CLINIC | Age: 87
End: 2022-03-07
Payer: MEDICARE

## 2022-03-07 VITALS
TEMPERATURE: 98 F | SYSTOLIC BLOOD PRESSURE: 130 MMHG | DIASTOLIC BLOOD PRESSURE: 75 MMHG | HEART RATE: 91 BPM | OXYGEN SATURATION: 96 % | BODY MASS INDEX: 27.77 KG/M2 | WEIGHT: 120 LBS | HEIGHT: 55 IN | RESPIRATION RATE: 16 BRPM

## 2022-03-07 DIAGNOSIS — N30.01 ACUTE CYSTITIS WITH HEMATURIA: Primary | ICD-10-CM

## 2022-03-07 DIAGNOSIS — K92.1 MELENA: ICD-10-CM

## 2022-03-07 LAB
BILIRUB UR QL STRIP: NORMAL
GLUCOSE UR-MCNC: NEGATIVE MG/DL
KETONES P FAST UR STRIP-MCNC: NORMAL MG/DL
PH UR STRIP: 5.5 [PH] (ref 4.6–8)
PROT UR QL STRIP: NORMAL
SP GR UR STRIP: 1.03 (ref 1–1.03)
UA UROBILINOGEN AMB POC: NORMAL (ref 0.2–1)
URINALYSIS CLARITY POC: NORMAL
URINALYSIS COLOR POC: NORMAL
URINE BLOOD POC: NORMAL
URINE LEUKOCYTES POC: NORMAL
URINE NITRITES POC: POSITIVE

## 2022-03-07 PROCEDURE — G8510 SCR DEP NEG, NO PLAN REQD: HCPCS | Performed by: INTERNAL MEDICINE

## 2022-03-07 PROCEDURE — 81002 URINALYSIS NONAUTO W/O SCOPE: CPT | Performed by: INTERNAL MEDICINE

## 2022-03-07 PROCEDURE — 1101F PT FALLS ASSESS-DOCD LE1/YR: CPT | Performed by: INTERNAL MEDICINE

## 2022-03-07 PROCEDURE — G8427 DOCREV CUR MEDS BY ELIG CLIN: HCPCS | Performed by: INTERNAL MEDICINE

## 2022-03-07 PROCEDURE — 99213 OFFICE O/P EST LOW 20 MIN: CPT | Performed by: INTERNAL MEDICINE

## 2022-03-07 PROCEDURE — G8419 CALC BMI OUT NRM PARAM NOF/U: HCPCS | Performed by: INTERNAL MEDICINE

## 2022-03-07 PROCEDURE — 1090F PRES/ABSN URINE INCON ASSESS: CPT | Performed by: INTERNAL MEDICINE

## 2022-03-07 PROCEDURE — G8536 NO DOC ELDER MAL SCRN: HCPCS | Performed by: INTERNAL MEDICINE

## 2022-03-07 PROCEDURE — G0463 HOSPITAL OUTPT CLINIC VISIT: HCPCS | Performed by: INTERNAL MEDICINE

## 2022-03-07 RX ORDER — CEFDINIR 300 MG/1
300 CAPSULE ORAL 2 TIMES DAILY
Qty: 10 CAPSULE | Refills: 0 | Status: SHIPPED | OUTPATIENT
Start: 2022-03-07 | End: 2022-03-12

## 2022-03-07 NOTE — PROGRESS NOTES
Maddi Shannon is a 80 y.o. female  Chief Complaint   Patient presents with    Blood in Urine    Melena       Visit Vitals  /75   Pulse 91   Temp 98 °F (36.7 °C) (Temporal)   Resp 16   Ht 4' 7\" (1.397 m)   Wt 120 lb (54.4 kg)   SpO2 96%   BMI 27.89 kg/m²          HPI  Ms. Loly Shore presents to clinic for hematuria of 4 days duration and she also noticed melena. She reports she noticed karen blood from urine on toilet bowl and melena in the last 4 days. Her bowel movement frequency unchanged two times a day. She reports mild dizziness on the day of presentation, otherwise, she is doing generally fine. She is recently started on eliquis for paroxismal a.fib in January. Past Medical History:   Diagnosis Date    Anemia     Arthritis     Coffey Ra lesion, chronic 5/8/2013    Seen on EGD 5/7/13    Chronic obstructive pulmonary disease ([address]     CVA (cerebral vascular accident) ([address]     Tiny punctate infarct right frontal lobe, 10/2016.  HEART (dyspnea on exertion) 6/12/2015    GERD (gastroesophageal reflux disease)     Hypercholesterolemia     Hypertension 9/22/2010    Ill-defined condition     Osteoporosis 9/22/2010    Seizures ([address]     Sick sinus syndrome due to SA node dysfunction (HCC) 12/17/2018      Allergies   Allergen Reactions    Nitrofurantoin Macrocrystal Other (comments)          ROS  Review of Systems   All other systems reviewed and are negative. EXAM  Physical Exam  Cardiovascular:      Rate and Rhythm: Normal rate and regular rhythm. Pulmonary:      Effort: Pulmonary effort is normal. No respiratory distress. Breath sounds: Normal breath sounds. No stridor. Abdominal:      General: There is no distension. Tenderness: There is no abdominal tenderness. There is no right CVA tenderness or left CVA tenderness.    Psychiatric:         Mood and Affect: Mood normal.         Behavior: Behavior normal.         Health Maintenance Due   Topic Date Due    Shingrix Vaccine Age 50> (1 of 2) Never done    Flu Vaccine (1) 09/01/2021     ASSESSMENT/PLAN    Diagnoses and all orders for this visit:    1. Acute cystitis with hematuria  -     cefdinir (OMNICEF) 300 mg capsule; Take 1 Capsule by mouth two (2) times a day for 5 days. -     CBC WITH AUTOMATED DIFF; Future  -     METABOLIC PANEL, COMPREHENSIVE; Future  -     AMB POC URINALYSIS DIP STICK MANUAL W/O MICRO  -     CULTURE, URINE; Future    2. Melena  -     CBC WITH AUTOMATED DIFF; Future  -     METABOLIC PANEL, COMPREHENSIVE; Future  -     CULTURE, URINE; Future  -     OCCULT BLOOD IMMUNOASSAY,DIAGNOSTIC; Future      The melena could be due to apixaban vs iron sulfate, will hold apixaban for now and get FIT test and will consider GI referral.   Advised to go to ER if worsening of symptoms or frequent and black bowel movement, dizziness.      Ines Golden MD

## 2022-03-07 NOTE — PROGRESS NOTES
1. Have you been to the ER, urgent care clinic since your last visit? Hospitalized since your last visit? No    2. Have you seen or consulted any other health care providers outside of the [address] [place] [address] since your last visit? Include any pap smears or colon screening.  No   Chief Complaint   Patient presents with    Blood in Urine    Melena

## 2022-03-11 LAB — HEMOCCULT STL QL IA: NEGATIVE

## 2022-03-11 NOTE — PROGRESS NOTES
Called patient/her son: black stool likely due to iron. Will resume eliquis and if  she noted signs of bleeding/black stool, she can go to ER for further evaluation. Patient was also advised to stop cefdinir since her UTI has improved.

## 2022-03-18 PROBLEM — I48.0 PAF (PAROXYSMAL ATRIAL FIBRILLATION) (HCC): Status: ACTIVE | Noted: 2018-12-17

## 2022-03-18 PROBLEM — I49.5 SICK SINUS SYNDROME DUE TO SA NODE DYSFUNCTION (HCC): Status: ACTIVE | Noted: 2018-12-17

## 2022-03-19 PROBLEM — Z95.0 PACEMAKER: Status: ACTIVE | Noted: 2018-12-17

## 2022-03-20 PROBLEM — I63.9 CEREBROVASCULAR ACCIDENT (CVA) (HCC): Status: ACTIVE | Noted: 2017-08-14

## 2022-03-27 DIAGNOSIS — D50.0 IRON DEFICIENCY ANEMIA DUE TO CHRONIC BLOOD LOSS: ICD-10-CM

## 2022-03-27 DIAGNOSIS — E78.00 HYPERCHOLESTEREMIA: ICD-10-CM

## 2022-03-27 DIAGNOSIS — I10 HTN (HYPERTENSION), BENIGN: ICD-10-CM

## 2022-03-27 DIAGNOSIS — I63.9 CEREBROVASCULAR ACCIDENT (CVA), UNSPECIFIED MECHANISM (HCC): ICD-10-CM

## 2022-03-27 RX ORDER — OMEPRAZOLE 20 MG/1
CAPSULE, DELAYED RELEASE ORAL
Qty: 90 CAPSULE | Refills: 10 | Status: SHIPPED | OUTPATIENT
Start: 2022-03-27

## 2022-04-15 ENCOUNTER — TELEPHONE (OUTPATIENT)
Dept: INTERNAL MEDICINE CLINIC | Age: 87
End: 2022-04-15

## 2022-04-15 NOTE — TELEPHONE ENCOUNTER
Pharmacy called in ref to the discontinued Eliqis, Patients daughter states she was not aware of this change Please advise

## 2022-06-06 ENCOUNTER — TELEPHONE (OUTPATIENT)
Dept: CARDIOLOGY CLINIC | Age: 87
End: 2022-06-06

## 2022-06-06 NOTE — TELEPHONE ENCOUNTER
Patient would like a call back from the device clinic about her missed remote check, please advise                [phone]

## 2022-06-07 ENCOUNTER — OFFICE VISIT (OUTPATIENT)
Dept: CARDIOLOGY CLINIC | Age: 87
End: 2022-06-07
Payer: MEDICARE

## 2022-06-07 DIAGNOSIS — Z95.0 CARDIAC PACEMAKER IN SITU: Primary | ICD-10-CM

## 2022-06-07 PROCEDURE — 93294 REM INTERROG EVL PM/LDLS PM: CPT | Performed by: INTERNAL MEDICINE

## 2022-06-07 NOTE — TELEPHONE ENCOUNTER
Pt is going to send overdue [facility] in the next hour. Verified with her the next scheduled remote & office ck.

## 2022-06-07 NOTE — LETTER
6/8/2022 8:21 AM    Ms. Geoffrey Herron  [address] [place] [id number]            This letter confirms that we have received your scheduled remote check of your implanted     device on 6-7-22  . Our EP team will contact you via phone if there are significant abnormal    findings. Your next remote check from home is scheduled for 9-7-22  . If you have any questions, please call [address] [place] [address] Drive at [phone].                Sincerely,    Keila Snow MD [facility]

## 2022-06-25 DIAGNOSIS — I63.9 CEREBROVASCULAR ACCIDENT (CVA), UNSPECIFIED MECHANISM (HCC): ICD-10-CM

## 2022-06-25 DIAGNOSIS — D50.0 IRON DEFICIENCY ANEMIA DUE TO CHRONIC BLOOD LOSS: ICD-10-CM

## 2022-06-25 DIAGNOSIS — I10 HTN (HYPERTENSION), BENIGN: ICD-10-CM

## 2022-06-25 DIAGNOSIS — E78.00 HYPERCHOLESTEREMIA: ICD-10-CM

## 2022-06-27 RX ORDER — ATORVASTATIN CALCIUM 20 MG/1
TABLET, FILM COATED ORAL
Qty: 90 TABLET | Refills: 1 | Status: SHIPPED | OUTPATIENT
Start: 2022-06-27

## 2022-06-27 RX ORDER — METOPROLOL TARTRATE 100 MG/1
TABLET ORAL
Qty: 180 TABLET | Refills: 1 | Status: SHIPPED | OUTPATIENT
Start: 2022-06-27

## 2022-06-27 RX ORDER — LISINOPRIL 5 MG/1
TABLET ORAL
Qty: 90 TABLET | Refills: 1 | Status: SHIPPED | OUTPATIENT
Start: 2022-06-27

## 2022-06-27 RX ORDER — AMLODIPINE BESYLATE 5 MG/1
TABLET ORAL
Qty: 90 TABLET | Refills: 1 | Status: SHIPPED | OUTPATIENT
Start: 2022-06-27 | End: 2022-10-05

## 2022-06-27 NOTE — TELEPHONE ENCOUNTER
Received refill request for metoprolol 100 mg po tabs. Refill authorized.     Future Appointments   Date Time Provider Ian Guerrier   7/27/2022  1:15 PM MD MARJ Wilcox BS AMB   9/7/2022 11:15 AM REMOTE1, JANIA AEMS BS AMB   9/8/2022 11:40 AM MD POLY De Leon BS AMB   12/8/2022 10:20 AM [email or web address], JANIA AMES BS AMB   12/8/2022 10:40 AM MD KWAKU Goncalves BS AMB

## 2022-07-27 ENCOUNTER — OFFICE VISIT (OUTPATIENT)
Dept: INTERNAL MEDICINE CLINIC | Age: 87
End: 2022-07-27
Payer: MEDICARE

## 2022-07-27 VITALS — DIASTOLIC BLOOD PRESSURE: 83 MMHG | OXYGEN SATURATION: 97 % | SYSTOLIC BLOOD PRESSURE: 138 MMHG

## 2022-07-27 DIAGNOSIS — D50.0 IRON DEFICIENCY ANEMIA DUE TO CHRONIC BLOOD LOSS: ICD-10-CM

## 2022-07-27 DIAGNOSIS — Z00.00 MEDICARE ANNUAL WELLNESS VISIT, SUBSEQUENT: ICD-10-CM

## 2022-07-27 DIAGNOSIS — R56.9 SEIZURES (HCC): ICD-10-CM

## 2022-07-27 DIAGNOSIS — I10 ESSENTIAL HYPERTENSION: Primary | ICD-10-CM

## 2022-07-27 DIAGNOSIS — I48.0 PAF (PAROXYSMAL ATRIAL FIBRILLATION) (HCC): ICD-10-CM

## 2022-07-27 PROCEDURE — G8427 DOCREV CUR MEDS BY ELIG CLIN: HCPCS | Performed by: INTERNAL MEDICINE

## 2022-07-27 PROCEDURE — G0439 PPPS, SUBSEQ VISIT: HCPCS | Performed by: INTERNAL MEDICINE

## 2022-07-27 PROCEDURE — G8536 NO DOC ELDER MAL SCRN: HCPCS | Performed by: INTERNAL MEDICINE

## 2022-07-27 PROCEDURE — G8510 SCR DEP NEG, NO PLAN REQD: HCPCS | Performed by: INTERNAL MEDICINE

## 2022-07-27 PROCEDURE — G8417 CALC BMI ABV UP PARAM F/U: HCPCS | Performed by: INTERNAL MEDICINE

## 2022-07-27 PROCEDURE — 1101F PT FALLS ASSESS-DOCD LE1/YR: CPT | Performed by: INTERNAL MEDICINE

## 2022-07-27 NOTE — PROGRESS NOTES
Identified pt with two pt identifiers(name and ). Reviewed record in preparation for visit and have obtained necessary documentation. All patient medications has been reviewed. Chief Complaint   Patient presents with    Follow-up       3 most recent PHQ Screens 2022   Little interest or pleasure in doing things Not at all   Feeling down, depressed, irritable, or hopeless Not at all   Total Score PHQ 2 0   Trouble falling or staying asleep, or sleeping too much -   Feeling tired or having little energy -   Poor appetite, weight loss, or overeating -   Feeling bad about yourself - or that you are a failure or have let yourself or your family down -   Trouble concentrating on things such as school, work, reading, or watching TV -   Moving or speaking so slowly that other people could have noticed; or the opposite being so fidgety that others notice -   Thoughts of being better off dead, or hurting yourself in some way -   PHQ 9 Score -     Abuse Screening Questionnaire 3/7/2022   Do you ever feel afraid of your partner? N   Are you in a relationship with someone who physically or mentally threatens you? N   Is it safe for you to go home? Y       Health Maintenance Due   Topic    Shingrix Vaccine Age 49> (1 of 2)    COVID-19 Vaccine (4 - Booster for Moderna series)    Medicare Yearly Exam     Lipid Screen      Health Maintenance Review: Patient reminded of \"due or due soon\" health maintenance. I have asked the patient to contact his/her primary care provider (PCP) for follow-up on his/her health maintenance. There were no vitals filed for this visit.     Wt Readings from Last 3 Encounters:   22 120 lb (54.4 kg)   21 119 lb (54 kg)   21 120 lb 6.4 oz (54.6 kg)     Temp Readings from Last 3 Encounters:   22 98 °F (36.7 °C) (Temporal)   21 98.4 °F (36.9 °C) (Temporal)   21 98.2 °F (36.8 °C) (Temporal)     BP Readings from Last 3 Encounters:   22 130/75   21 115/68   11/30/21 (!) 148/79     Pulse Readings from Last 3 Encounters:   03/07/22 91   12/29/21 80   11/30/21 84       1. \"Have you been to the ER, urgent care clinic since your last visit? Hospitalized since your last visit? \" No    2. \"Have you seen or consulted any other health care providers outside of the [address] [place] [address] since your last visit? \" No     3. For patients aged 39-70: Has the patient had a colonoscopy / FIT/ Cologuard? NA - based on age      If the patient is female:    4. For patients aged 41-77: Has the patient had a mammogram within the past 2 years? NA - based on age or sex      11. For patients aged 21-65: Has the patient had a pap smear? NA - based on age or sex     ation while they are present in the room.

## 2022-07-28 NOTE — PATIENT INSTRUCTIONS

## 2022-07-28 NOTE — PROGRESS NOTES
This is the Subsequent Medicare Annual Wellness Exam, performed 12 months or more after the Initial AWV or the last Subsequent AWV    I have reviewed the patient's medical history in detail and updated the computerized patient record. Assessment/Plan   Education and counseling provided:  Are appropriate based on today's review and evaluation  End-of-Life planning (with patient's consent)  Pneumococcal Vaccine  Influenza Vaccine    1. Essential hypertension  -     LIPID PANEL; Future  -     METABOLIC PANEL, COMPREHENSIVE; Future  -     MAGNESIUM; Future  2. PAF (paroxysmal atrial fibrillation) (HCC)  3. Seizures ([address]  4. Iron deficiency anemia due to chronic blood loss  -     CBC WITH AUTOMATED DIFF; Future  -     IRON PROFILE; Future  5. Medicare annual wellness visit, subsequent       Depression Risk Factor Screening     3 most recent PHQ Screens 7/27/2022   Little interest or pleasure in doing things Not at all   Feeling down, depressed, irritable, or hopeless Not at all   Total Score PHQ 2 0   Trouble falling or staying asleep, or sleeping too much -   Feeling tired or having little energy -   Poor appetite, weight loss, or overeating -   Feeling bad about yourself - or that you are a failure or have let yourself or your family down -   Trouble concentrating on things such as school, work, reading, or watching TV -   Moving or speaking so slowly that other people could have noticed; or the opposite being so fidgety that others notice -   Thoughts of being better off dead, or hurting yourself in some way -   PHQ 9 Score -       Alcohol & Drug Abuse Risk Screen    Do you average more than 1 drink per night or more than 7 drinks a week: On any one occasion in the past three months have you have had more than 3 drinks containing alcohol:            Functional Ability and Level of Safety    Hearing: The patient wears hearing aids. Still very poor      Activities of Daily Living:   The home contains: handrails and grab bars  Patient needs help with:  transportation      Ambulation: with difficulty, uses a walker     Fall Risk:  Fall Risk Assessment, last 12 mths 7/27/2022   Able to walk? Yes   Fall in past 12 months? 0   Do you feel unsteady? 0   Are you worried about falling 0   Is TUG test greater than 12 seconds? -   Is the gait abnormal? -   Number of falls in past 12 months -   Fall with injury? -      Abuse Screen:  Patient is not abused       Cognitive Screening    Has your family/caregiver stated any concerns about your memory: no     Cognitive Screening: Normal - Verbal Fluency Test    Health Maintenance Due     Health Maintenance Due   Topic Date Due    Shingrix Vaccine Age 49> (1 of 2) Never done    COVID-19 Vaccine (4 - Booster for Moderna series) 03/20/2022    Lipid Screen  07/13/2022       Patient Care Team   Patient Care Team:  Dylan Hayward MD as PCP - General  Dylan Hayward MD as PCP [facility]  May Chisholm MD (Cardiovascular Disease Physician)  Trev Cardenas MD as Physician (Neurology)    History     Patient Active Problem List   Diagnosis Code    Osteoporosis M81.0    Hypercholesteremia E78.00    Iron deficiency anemia D50.9    Russell Luis Antonio lesion, chronic K25.7    HEART (dyspnea on exertion) R06.00    Syncope and collapse H88    Diastolic dysfunction G90.25    HTN (hypertension), benign I10    Seizures ([address] R56.9    Cerebrovascular accident (CVA) ([address] I63.9    Pacemaker Z95.0    Sick sinus syndrome due to SA node dysfunction ([address] I49.5    PAF (paroxysmal atrial fibrillation) ([address] I48.0     Past Medical History:   Diagnosis Date    Anemia     Arthritis     Khadar lesion, chronic 5/8/2013    Seen on EGD 5/7/13    Chronic obstructive pulmonary disease ([address]     CVA (cerebral vascular accident) ([address]     Tiny punctate infarct right frontal lobe, 10/2016.     HEART (dyspnea on exertion) 6/12/2015    GERD (gastroesophageal reflux disease)     Hypercholesterolemia Hypertension 9/22/2010    Ill-defined condition     Osteoporosis 9/22/2010    Seizures ([facility]     Sick sinus syndrome due to SA node dysfunction ([facility] 12/17/2018      Past Surgical History:   Procedure Laterality Date    ENDOSCOPY, COLON, DIAGNOSTIC  2008, 2013    HX CATARACT REMOVAL      HX GI  2013    egd   kia ulcer    HX ORTHOPAEDIC  2010    knee replacement    IMPLANT  LOOP RECORDER  11/17/2016         MI CARDIAC SURG PROCEDURE UNLIST  2008    nuclear stress test normal     MI CARDIAC SURG PROCEDURE UNLIST  2018    pacemaker    MI INS NEW/RPLCMT PRM PM W/TRANSV ELTRD ATRIAL&VENT  12/17/2018         MI RMVL IMPLANTABLE PT-ACTIVATED CAR EVENT RECORDER  12/17/2018          Current Outpatient Medications   Medication Sig Dispense Refill    metoprolol tartrate (LOPRESSOR) 100 mg IR tablet TAKE ONE TABLET BY MOUTH 2 TIMES A  Tablet 1    lisinopriL (PRINIVIL, ZESTRIL) 5 mg tablet TAKE 1 TABLET BY MOUTH EVERY DAY 90 Tablet 1    amLODIPine (NORVASC) 5 mg tablet TAKE ONE TABLET BY MOUTH EVERY DAY **DUE FOR OFFICE VISIT** 90 Tablet 1    atorvastatin (LIPITOR) 20 mg tablet TAKE ONE TABLET BY MOUTH EVERY DAY 90 Tablet 1    apixaban (ELIQUIS) 2.5 mg tablet Take 1 Tablet by mouth two (2) times a day. 60 Tablet 5    omeprazole (PRILOSEC) 20 mg capsule TAKE ONE CAPSULE BY MOUTH EVERY DAY 30 MINUTES BEFORE A MEAL FOR STOMACH ACID 90 Capsule 10    levETIRAcetam (KEPPRA) 250 mg tablet TAKE ONE TABLET BY MOUTH 2 TIMES A  Tablet 3    vit C/E/zinc/lutein/zeaxanthin (OCUVITE EYE HEALTH PO) Take  by mouth two (2) times a day. cholecalciferol, vitamin D3, 50 mcg (2,000 unit) tab Take  by mouth. albuterol (PROAIR HFA) 90 mcg/actuation inhaler Take 2 Puffs by inhalation every four (4) hours as needed for Wheezing or Shortness of Breath. Ventolin brand requested (Patient taking differently: Take 2 Puffs by inhalation as needed for Shortness of Breath.  Ventolin brand requested) 1 Inhaler 6    ferrous sulfate 325 mg (65 mg iron) tablet Take 1 Tab by mouth every fourty-eight (48) hours. Indications: Iron Deficiency Anemia (Patient taking differently: Take  by mouth every other day. Indications: anemia from inadequate iron) 100 Tab 6    acetaminophen (TYLENOL) 500 mg tablet Take 2 Tabs by mouth every six (6) hours. (Patient taking differently: Take 1,000 mg by mouth as needed.) 100 Tab 0    CALCIUM CARBONATE (OYSTER SHELL CALCIUM 500 PO) Take 1 Tab by mouth daily. latanoprost (XALATAN) 0.005 % ophthalmic solution Administer 1 Drop to both eyes nightly. timolol (TIMOPTIC) 0.5 % ophthalmic solution Administer 1 Drop to right eye two (2) times a day. Allergies   Allergen Reactions    Nitrofurantoin Macrocrystal Other (comments)       Family History   Problem Relation Age of Onset    Heart Disease Mother     Heart Disease Father     Heart Disease Sister     Alzheimer's Disease Sister     Alcohol abuse Brother      Social History     Tobacco Use    Smoking status: Former     Years: 30.00     Types: Cigarettes     Quit date: 2000     Years since quittin.8    Smokeless tobacco: Never   Substance Use Topics    Alcohol use: Yes     Alcohol/week: 6.0 standard drinks     Types: 6 Glasses of wine per week     covid vaccinations reviewed and priority reviewed and my advice to get vaccinated reviewed  Vitals:    22 1324   BP: 138/83   SpO2: 97%     Thin short  Chest clear  . Hard of hearing  Reg rhythm      1. PAF (paroxysmal atrial fibrillation) (HCC)  On beta bocker    2. Seizures ([address]  none    3. Essential hypertension  controlled  - LIPID PANEL; Future  - METABOLIC PANEL, COMPREHENSIVE; Future  - MAGNESIUM; Future    4. Iron deficiency anemia due to chronic blood loss  follow  - CBC WITH AUTOMATED DIFF; Future  - IRON PROFILE; Future    5.  Medicare annual wellness visit, subsequent  Reviewed  Will coordinate care with all the physicians and providers    Labs before next visit    Rebecca Valdez MD

## 2022-09-08 ENCOUNTER — DOCUMENTATION ONLY (OUTPATIENT)
Dept: NEUROLOGY | Age: 87
End: 2022-09-08

## 2022-09-08 ENCOUNTER — OFFICE VISIT (OUTPATIENT)
Dept: NEUROLOGY | Age: 87
End: 2022-09-08
Payer: MEDICARE

## 2022-09-08 VITALS
RESPIRATION RATE: 16 BRPM | SYSTOLIC BLOOD PRESSURE: 120 MMHG | WEIGHT: 113 LBS | OXYGEN SATURATION: 93 % | HEART RATE: 75 BPM | HEIGHT: 55 IN | DIASTOLIC BLOOD PRESSURE: 72 MMHG | BODY MASS INDEX: 26.15 KG/M2

## 2022-09-08 DIAGNOSIS — R26.9 GAIT DISTURBANCE: ICD-10-CM

## 2022-09-08 DIAGNOSIS — Z91.81 FALLS INFREQUENTLY: ICD-10-CM

## 2022-09-08 DIAGNOSIS — R56.9 SEIZURES (HCC): Primary | ICD-10-CM

## 2022-09-08 PROCEDURE — 1123F ACP DISCUSS/DSCN MKR DOCD: CPT | Performed by: PSYCHIATRY & NEUROLOGY

## 2022-09-08 PROCEDURE — 1090F PRES/ABSN URINE INCON ASSESS: CPT | Performed by: PSYCHIATRY & NEUROLOGY

## 2022-09-08 PROCEDURE — G8427 DOCREV CUR MEDS BY ELIG CLIN: HCPCS | Performed by: PSYCHIATRY & NEUROLOGY

## 2022-09-08 PROCEDURE — G8417 CALC BMI ABV UP PARAM F/U: HCPCS | Performed by: PSYCHIATRY & NEUROLOGY

## 2022-09-08 PROCEDURE — G8536 NO DOC ELDER MAL SCRN: HCPCS | Performed by: PSYCHIATRY & NEUROLOGY

## 2022-09-08 PROCEDURE — 99213 OFFICE O/P EST LOW 20 MIN: CPT | Performed by: PSYCHIATRY & NEUROLOGY

## 2022-09-08 PROCEDURE — G8432 DEP SCR NOT DOC, RNG: HCPCS | Performed by: PSYCHIATRY & NEUROLOGY

## 2022-09-08 PROCEDURE — 1101F PT FALLS ASSESS-DOCD LE1/YR: CPT | Performed by: PSYCHIATRY & NEUROLOGY

## 2022-09-08 RX ORDER — LEVETIRACETAM 250 MG/1
TABLET ORAL
Qty: 180 TABLET | Refills: 3 | Status: SHIPPED | OUTPATIENT
Start: 2022-09-08

## 2022-09-08 NOTE — PROGRESS NOTES
Chief Complaint   Patient presents with    Follow-up     Seizure: Patient reports no new symptoms. No seizure since the last visit.       Visit Vitals  /72   Pulse 75   Resp 16   Ht 4' 7\" (1.397 m)   Wt 113 lb (51.3 kg)   SpO2 93%   BMI 26.26 kg/m²

## 2022-09-08 NOTE — PROGRESS NOTES
Faxed over [facility] referral to [phone]. Received a successful confirmation back.

## 2022-09-08 NOTE — LETTER
10/2/2022    Patient: Reva Martinez   YOB: 1933   Date of Visit: 9/8/2022     Keyur Paz MD  [facility]  [facility]    Dear Keyur Paz MD,      Thank you for referring Ms. Toyin Ceja to [address] [place] [address] for evaluation. My notes for this consultation are attached. If you have questions, please do not hesitate to call me. I look forward to following your patient along with you.       Sincerely,    Antionette Steven MD

## 2022-09-08 NOTE — PROGRESS NOTES
Neurology Progress Note    HISTORY PROVIDED BY: patient and Son    Chief Complaint:   Chief Complaint   Patient presents with    Follow-up     Seizure: Patient reports no new symptoms. No seizure since the last visit. Subjective:   Pt is an 80 y.o. RH female last seen in clinic on 9/8/21 in fu for probable epilepsy, hospitalized at [facility] 11/15-19/16 for episode of LOC at home resulting in a compression fracture at T7 and tongue trauma, with 2 prior episodes of unexplained LOC, the last in April, 2016 and associated with tongue trauma at that time as well. Additionally, MRI of the brain 11/15/16 with tiny acute infarct in the right deep white matter. EEG 11/15/16 was normal. Started on Keppra 250mg bid empirically for seizures without episode of LOC since and no side effects to med. Exam was non-focal and unremarkable. Seizures well controlled. Continued Keppra 250mg bid. She returns for fu. She is doing well. No seizures. Taking Keppra 250mg bid. She has not had any health issues. She fell last night, landed on buttocks, was able to get herself up. She was not using her cane. She also has a walker at home. [place] [facility] [place]. Past Medical History:   Diagnosis Date    Anemia     Arthritis     [facility], chronic 5/8/2013    Seen on EGD 5/7/13    Chronic obstructive pulmonary disease ([address]     CVA (cerebral vascular accident) ([address]     Tiny punctate infarct right frontal lobe, 10/2016.     HEART (dyspnea on exertion) 6/12/2015    GERD (gastroesophageal reflux disease)     Hypercholesterolemia     Hypertension 9/22/2010    Ill-defined condition     Osteoporosis 9/22/2010    Seizures ([address]     Sick sinus syndrome due to SA node dysfunction ([address] 12/17/2018      Past Surgical History:   Procedure Laterality Date    ENDOSCOPY, COLON, DIAGNOSTIC  2008, 2013    HX CATARACT REMOVAL      HX GI  2013    egd   kia ulcer    HX ORTHOPAEDIC  2010    knee replacement    IMPLANT LOOP RECORDER  2016         ME CARDIAC SURG PROCEDURE UNLIST      nuclear stress test normal     ME CARDIAC SURG PROCEDURE UNLIST      pacemaker    ME INS NEW/RPLCMT PRM PM W/TRANSV ELTRD ATRIAL&VENT  2018         ME RMVL IMPLANTABLE PT-ACTIVATED CAR EVENT RECORDER  2018           Social History     Socioeconomic History    Marital status: SINGLE     Spouse name: Not on file    Number of children: Not on file    Years of education: Not on file    Highest education level: Not on file   Occupational History    Not on file   Tobacco Use    Smoking status: Former     Years: 30.00     Types: Cigarettes     Quit date: 2000     Years since quittin.9    Smokeless tobacco: Never   Vaping Use    Vaping Use: Never used   Substance and Sexual Activity    Alcohol use:  Yes     Alcohol/week: 6.0 standard drinks     Types: 6 Glasses of wine per week    Drug use: No    Sexual activity: Not Currently   Other Topics Concern    Not on file   Social History Narrative    Not on file     Social Determinants of Health     Financial Resource Strain: Not on file   Food Insecurity: Not on file   Transportation Needs: Not on file   Physical Activity: Not on file   Stress: Not on file   Social Connections: Not on file   Intimate Partner Violence: Not on file   Housing Stability: Not on file     Family History   Problem Relation Age of Onset    Heart Disease Mother     Heart Disease Father     Heart Disease Sister     Alzheimer's Disease Sister     Alcohol abuse Brother           Objective:   ROS:  Per HPI o/w neg    Allergies   Allergen Reactions    Nitrofurantoin Macrocrystal Other (comments)       Meds:  Outpatient Medications Prior to Visit   Medication Sig Dispense Refill    metoprolol tartrate (LOPRESSOR) 100 mg IR tablet TAKE ONE TABLET BY MOUTH 2 TIMES A  Tablet 1    lisinopriL (PRINIVIL, ZESTRIL) 5 mg tablet TAKE 1 TABLET BY MOUTH EVERY DAY 90 Tablet 1    amLODIPine (NORVASC) 5 mg tablet TAKE ONE TABLET BY MOUTH EVERY DAY **DUE FOR OFFICE VISIT** 90 Tablet 1    atorvastatin (LIPITOR) 20 mg tablet TAKE ONE TABLET BY MOUTH EVERY DAY 90 Tablet 1    apixaban (ELIQUIS) 2.5 mg tablet Take 1 Tablet by mouth two (2) times a day. 60 Tablet 5    omeprazole (PRILOSEC) 20 mg capsule TAKE ONE CAPSULE BY MOUTH EVERY DAY 30 MINUTES BEFORE A MEAL FOR STOMACH ACID 90 Capsule 10    levETIRAcetam (KEPPRA) 250 mg tablet TAKE ONE TABLET BY MOUTH 2 TIMES A  Tablet 3    vit C/E/zinc/lutein/zeaxanthin (OCUVITE EYE HEALTH PO) Take  by mouth two (2) times a day. albuterol (PROAIR HFA) 90 mcg/actuation inhaler Take 2 Puffs by inhalation every four (4) hours as needed for Wheezing or Shortness of Breath. Ventolin brand requested (Patient taking differently: Take 2 Puffs by inhalation as needed for Shortness of Breath. Ventolin brand requested) 1 Inhaler 6    ferrous sulfate 325 mg (65 mg iron) tablet Take 1 Tab by mouth every fourty-eight (48) hours. Indications: Iron Deficiency Anemia (Patient taking differently: Take  by mouth every other day. Indications: anemia from inadequate iron) 100 Tab 6    acetaminophen (TYLENOL) 500 mg tablet Take 2 Tabs by mouth every six (6) hours. (Patient taking differently: Take 1,000 mg by mouth as needed.) 100 Tab 0    latanoprost (XALATAN) 0.005 % ophthalmic solution Administer 1 Drop to both eyes nightly. timolol (TIMOPTIC) 0.5 % ophthalmic solution Administer 1 Drop to right eye two (2) times a day. cholecalciferol, vitamin D3, 50 mcg (2,000 unit) tab Take  by mouth. (Patient not taking: Reported on 9/8/2022)      CALCIUM CARBONATE (OYSTER SHELL CALCIUM 500 PO) Take 1 Tab by mouth daily. (Patient not taking: Reported on 9/8/2022)       No facility-administered medications prior to visit.        Imaging:  MRI Results (most recent):  Results from Hospital Encounter encounter on 06/09/17    MRI [facility] SPINE WO CONT    Narrative  EXAM:  MRI [facility] SPINE WO CONT    INDICATION: T/L-spine trauma, significant injury suspected, +/- localizing  signs. Low back pain    COMPARISON: 11/15/2016    TECHNIQUE: MR imaging of the thoracic spine was performed using the following  sequences: sagittal T1, T2, stir; axial T1, T2.    CONTRAST: None. FINDINGS:    Mild mid thoracic kyphosis is noted. There is no significant subluxation. . There  is a severe, chronic compression fracture at T7. There is an acute, severe  inferior and superior endplate compression fracture at T12. There is only  minimal bulging of the posterior inferior margin of the fracture. No evidence of  extension into the pedicles or posterior elements is seen. . Mild osteophytic  bony endplate changes are noted at multiple levels. . No concerning signal  changes are seen in the visualized paraspinal soft tissues. The course, caliber, and signal intensity of the spinal cord are normal.    A large hiatal hernia is redemonstrated. Multiple minimal disc bulges and/or protrusions are noted. The largest of these  are a left paracentral disc protrusion at T5-T6 and a right paracentral disc  protrusion at T4-T5. Both of these partially efface the lateral recesses. Minimal bulging of the posterior inferior margin of the T12 fracture causes no  significant narrowing of the canal. There is no significant spinal canal  stenosis throughout the thoracic spine. Impression  IMPRESSION:    1. Acute, severe T12 compression fracture. Minimal bulging of the posterior  inferior margin of the fracture, however this does not cause significant spinal  canal stenosis. No evidence of extension of the fracture into the pedicles or  posterior elements. 2. Chronic T7 compression fracture. 3. Mild to mild/moderate thoracic spondylosis. 23X  829     CT Results (most recent):  Results from Hospital Encounter encounter on 12/07/18    CT HEAD WO CONT    Narrative  INDICATION:  AMS, fall    Examination: Unenhanced head CT.  Axial images were obtained from skull base to  the vertex. CT dose reduction was achieved through use of a standardized protocol tailored  for this examination and are automatic exposure control for dose modulation dose  reduction    COMPARISON: 11/15/2016    FINDINGS: The ventricles and cisterns are normal in size and configuration for  age. There are mild periventricular white matter changes. While nonspecific  these are likely due to small vessel ischemic change. There is no evidence of  midline shift, mass lesion or mass effect. No evidence of acute bleed or acute  infarct. No depressed skull fractures. Impression  IMPRESSION:  1. Chronic appearing white matter changes. No acute abnormality       Reviewed records in [employer] [facility] [employer] tab today    Lab Review   Results for orders placed or performed in visit on 03/07/22   CULTURE, URINE    Specimen: Clean catch; Urine   Result Value Ref Range    Special Requests: NO SPECIAL REQUESTS      [place] Count >100,000  COLONIES/mL        Culture result: ESCHERICHIA COLI (A)         Susceptibility    Escherichia coli - JAYNE     Amikacin ($)*  Susceptible ug/mL      * Susceptible     Ampicillin ($)*  Resistant ug/mL      * Resistant     Ampicillin/sulbactam ($)*  Susceptible ug/mL      * Susceptible     Cefazolin ($)*  Susceptible ug/mL      * Susceptible     Cefepime ($$)*  Susceptible ug/mL      * Susceptible     Cefoxitin*  Susceptible ug/mL      * Susceptible     Ceftazidime ($)*  Susceptible ug/mL      * Susceptible     Ceftriaxone ($)*  Susceptible ug/mL      * Susceptible     Ciprofloxacin ($)*  Susceptible ug/mL      * Susceptible**FDA INTERPRETATION REFLECTED, REFER TO CLSI FOR ALTERNATE INTERPRETATIONS.**     Gentamicin ($)*  Susceptible ug/mL      * Susceptible     Levofloxacin ($)*  Susceptible ug/mL      * Susceptible**FDA INTERPRETATION REFLECTED, REFER TO CLSI FOR ALTERNATE INTERPRETATIONS. **Susceptible**FDA INTERPRETATION REFLECTED, REFER TO CLSI FOR ALTERNATE INTERPRETATIONS. ** Meropenem ($$)*  Susceptible ug/mL      * Susceptible     Nitrofurantoin*  Susceptible ug/mL      * Susceptible     Piperacillin/Tazobac ($)*  Susceptible ug/mL      * Susceptible     Tobramycin ($)*  Susceptible ug/mL      * Susceptible     Trimeth/Sulfa*  Susceptible ug/mL      * Susceptible   METABOLIC PANEL, COMPREHENSIVE   Result Value Ref Range    Sodium 137 136 - 145 mmol/L    Potassium 4.3 3.5 - 5.1 mmol/L    Chloride 105 97 - 108 mmol/L    CO2 29 21 - 32 mmol/L    Anion gap 3 (L) 5 - 15 mmol/L    Glucose 84 65 - 100 mg/dL    BUN 14 6 - 20 MG/DL    Creatinine 0.78 0.55 - 1.02 MG/DL    BUN/Creatinine ratio 18 12 - 20      GFR est AA >60 >60 ml/min/1.73m2    GFR est non-AA >60 >60 ml/min/1.73m2    Calcium 9.3 8.5 - 10.1 MG/DL    Bilirubin, total 0.5 0.2 - 1.0 MG/DL    ALT (SGPT) 29 12 - 78 U/L    AST (SGOT) 24 15 - 37 U/L    Alk. phosphatase 82 45 - 117 U/L    Protein, total 7.2 6.4 - 8.2 g/dL    Albumin 3.8 3.5 - 5.0 g/dL    Globulin 3.4 2.0 - 4.0 g/dL    A-G Ratio 1.1 1.1 - 2.2     CBC WITH AUTOMATED DIFF   Result Value Ref Range    WBC 7.7 3.6 - 11.0 K/uL    RBC 4.11 3.80 - 5.20 M/uL    HGB 13.1 11.5 - 16.0 g/dL    HCT 40.4 35.0 - 47.0 %    MCV 98.3 80.0 - 99.0 FL    MCH 31.9 26.0 - 34.0 PG    MCHC 32.4 30.0 - 36.5 g/dL    RDW 13.3 11.5 - 14.5 %    PLATELET 853 938 - 424 K/uL    MPV 10.9 8.9 - 12.9 FL    NRBC 0.0 0  WBC    ABSOLUTE NRBC 0.00 0.00 - 0.01 K/uL    NEUTROPHILS 69 32 - 75 %    LYMPHOCYTES 18 12 - 49 %    MONOCYTES 10 5 - 13 %    EOSINOPHILS 2 0 - 7 %    BASOPHILS 1 0 - 1 %    IMMATURE GRANULOCYTES 0 0.0 - 0.5 %    ABS. NEUTROPHILS 5.3 1.8 - 8.0 K/UL    ABS. LYMPHOCYTES 1.4 0.8 - 3.5 K/UL    ABS. MONOCYTES 0.8 0.0 - 1.0 K/UL    ABS. EOSINOPHILS 0.1 0.0 - 0.4 K/UL    ABS. BASOPHILS 0.1 0.0 - 0.1 K/UL    ABS. IMM.  GRANS. 0.0 0.00 - 0.04 K/UL    DF AUTOMATED     SAMPLES BEING HELD   Result Value Ref Range    SAMPLES BEING HELD 1 UA     COMMENT        Add-on orders for these samples will be processed based on acceptable specimen integrity and analyte stability, which may vary by analyte. Exam:  Visit Vitals  /72   Pulse 75   Resp 16   Ht 4' 7\" (1.397 m)   Wt 113 lb (51.3 kg)   SpO2 93%   BMI 26.26 kg/m²     General:  Alert, cooperative, no distress. Head:  Normocephalic, without obvious abnormality, atraumatic. Respiratory:  Heart:   Non labored breathing  Regular rate and rhythm, no murmurs   Neck:    Extremities: Warm, no cyanosis or edema. Pulses: 2+ radial pulses. Neurologic:  MS: Alert, speech intact. Language intact. Attention and fund of knowledge appropriate. Recent and remote memory intact.   Cranial Nerves:  II: visual fields    II: pupils    II: optic disc    III,VII: ptosis none   III,IV,VI: extraocular muscles  EOMI, no nystagmus or diplopia   V: facial light touch sensation     VII: facial muscle function   symmetric   VIII: hearing Diminished with hearing aids in place   IX: soft palate elevation     XI: trapezius strength     XI: sternocleidomastoid strength    XII: tongue       Motor: 5/5 throughout, no PD, no tremors  Coordination: Intact FTN  Gait: Unsteady gait without cane  Reflexes: 2+ sym    Mini Mental State Exam 7/31/2019 7/5/2017 1/5/2017   What is the Year 1 1 1   What is the Season 1 1 1   What is the Date 1 1 1   What is the Day 1 1 1   What is the Month 1 1 1   Where are we State 1 1 1   Where are we Country 1 1 1   Where are we [place] [facility] 1 1 1   Where are we Floor 1 1 1   Name three objects, then ask the patient to say them 3 3 3   Serial sevens Subtract 7 from 100 in increments 5 5 5   Ask for the three objects repeated above 3 3 3   Name a pencil 1 1 1   Name a watch 1 1 1   Have the patient repeat this phrase \"No ifs, ands, or buts\" 1 1 1   Three stage command: Take the paper in your right hand 1 1 1   Fold the paper in half 1 1 1   Put the paper on the floor 1 1 1   Read and obey the following: CLOSE YOUR EYES 1 1 1   Have the patient write a sentence 1 1 1   Have the patient copy a figure 1 1 1   Mini Mental Score 30 30 30   Some recent data might be hidden          Assessment/Plan   Pt is an 80 y.o. RH female with presumed epilepsy, hospitalized at [facility] 11/15-19/16 for episode of LOC at home resulting in a compression fracture at T7 and tongue trauma, with 2 prior episodes of unexplained LOC, the last in April, 2016 and associated with tongue trauma at that time as well. Additionally, MRI of the brain 11/15/16 with tiny acute infarct in the right deep white matter. EEG 11/15/16 was normal. Started on Keppra 250mg bid empirically without episode of LOC since. Now reporting a fall at home without injury, not using assistive device. Exam was non-focal and unremarkable. Seizures well controlled. Continue Keppra 250mg bid. Referral to [facility] PT/OT for gait, fall prevention, strengthening. Follow-up in clinic in 1 year, instructed to call in the interim with any questions or concerns. ICD-10-CM ICD-9-CM    1. Seizures ([facility]  R56.9 780.39 levETIRAcetam (KEPPRA) 250 mg tablet      2. Gait disturbance  R26.9 781.2 REFERRAL TO HOME HEALTH      3. Falls infrequently  Z91.81 V15.88 REFERRAL TO HOME HEALTH            Signed:   Silke Rdz MD  9/8/2022

## 2022-09-10 ENCOUNTER — HOSPITAL ENCOUNTER (EMERGENCY)
Age: 87
Discharge: HOME | End: 2022-09-10
Attending: EMERGENCY MEDICINE
Payer: MEDICARE

## 2022-09-10 ENCOUNTER — HOSPITAL ENCOUNTER (EMERGENCY)
Dept: GENERAL RADIOLOGY | Age: 87
Discharge: HOME | End: 2022-09-10
Attending: EMERGENCY MEDICINE
Payer: MEDICARE

## 2022-09-10 VITALS
WEIGHT: 126 LBS | SYSTOLIC BLOOD PRESSURE: 117 MMHG | HEIGHT: 58 IN | DIASTOLIC BLOOD PRESSURE: 63 MMHG | TEMPERATURE: 97.1 F | OXYGEN SATURATION: 98 % | HEART RATE: 76 BPM | BODY MASS INDEX: 26.45 KG/M2 | RESPIRATION RATE: 16 BRPM

## 2022-09-10 DIAGNOSIS — M54.50 ACUTE RIGHT-SIDED LOW BACK PAIN WITHOUT SCIATICA: Primary | ICD-10-CM

## 2022-09-10 PROCEDURE — 71101 X-RAY EXAM UNILAT RIBS/CHEST: CPT

## 2022-09-10 PROCEDURE — 71100 X-RAY EXAM RIBS UNI 2 VIEWS: CPT

## 2022-09-10 PROCEDURE — 74011000250 HC RX REV CODE- 250: Performed by: EMERGENCY MEDICINE

## 2022-09-10 PROCEDURE — 99283 EMERGENCY DEPT VISIT LOW MDM: CPT

## 2022-09-10 PROCEDURE — 72100 X-RAY EXAM L-S SPINE 2/3 VWS: CPT

## 2022-09-10 RX ORDER — LIDOCAINE 4 G/100G
1 PATCH TOPICAL EVERY 24 HOURS
Status: DISCONTINUED | OUTPATIENT
Start: 2022-09-10 | End: 2022-09-10 | Stop reason: HOSPADM

## 2022-09-10 NOTE — ED TRIAGE NOTES
Pt was wheeled to the treatment area accompanied by her daughter in law. Pts daughter in law states \"she lives alone and she was using her cane to get to the bathroom and said her legs gave out she did not pass out or hit her head. She is complaining of right back pain/flankpain. \"

## 2022-09-10 NOTE — ED NOTES
Based on TRST score of 2, functional assessment discussed in collaboration with Provider, Family member/representative Daughter in [place], and Patient. Additional referral to Case Managment is not needed at this time pt is going through her PCP for physical therapy in the home to help with her balance and movement .

## 2022-09-10 NOTE — ED PROVIDER NOTES
Date: 9/10/2022  Patient Name: Lovely Tang    History of Presenting Illness     Chief Complaint   Patient presents with    Back Pain    Flank Pain    Fall       History Provided By: Patient    HPI: Lovely Tang, 80 y.o. female  presents to the ED with cc of R-sided low back pain after suffering a fall around 2-230 am. She states she was going to the bathroom when she fell, hitting her back against the shower stool. She denied any head trauma or LOC. She was able to get up and walk afterwards, and is ambulatory here today. Daughter reports that the patient is supposed to be using her rollator all the time but she was using her cane when going to the bathroom last night. Pt took some tylenol earlier today. She is on Eliquis. She denies any SOB. There are no other complaints, changes, or physical findings at this time. PCP: Chanel Capone MD    No current facility-administered medications on file prior to encounter. Current Outpatient Medications on File Prior to Encounter   Medication Sig Dispense Refill    levETIRAcetam (KEPPRA) 250 mg tablet TAKE ONE TABLET BY MOUTH 2 TIMES A  Tablet 3    metoprolol tartrate (LOPRESSOR) 100 mg IR tablet TAKE ONE TABLET BY MOUTH 2 TIMES A  Tablet 1    lisinopriL (PRINIVIL, ZESTRIL) 5 mg tablet TAKE 1 TABLET BY MOUTH EVERY DAY 90 Tablet 1    amLODIPine (NORVASC) 5 mg tablet TAKE ONE TABLET BY MOUTH EVERY DAY **DUE FOR OFFICE VISIT** 90 Tablet 1    atorvastatin (LIPITOR) 20 mg tablet TAKE ONE TABLET BY MOUTH EVERY DAY 90 Tablet 1    apixaban (ELIQUIS) 2.5 mg tablet Take 1 Tablet by mouth two (2) times a day. 60 Tablet 5    omeprazole (PRILOSEC) 20 mg capsule TAKE ONE CAPSULE BY MOUTH EVERY DAY 30 MINUTES BEFORE A MEAL FOR STOMACH ACID 90 Capsule 10    vit C/E/zinc/lutein/zeaxanthin (OCUVITE EYE HEALTH PO) Take  by mouth two (2) times a day. cholecalciferol, vitamin D3, 50 mcg (2,000 unit) tab Take  by mouth.       albuterol (PROAIR HFA) 90 mcg/actuation inhaler Take 2 Puffs by inhalation every four (4) hours as needed for Wheezing or Shortness of Breath. Ventolin brand requested (Patient taking differently: Take 2 Puffs by inhalation as needed for Shortness of Breath. Ventolin brand requested) 1 Inhaler 6    ferrous sulfate 325 mg (65 mg iron) tablet Take 1 Tab by mouth every fourty-eight (48) hours. Indications: Iron Deficiency Anemia (Patient taking differently: Take  by mouth every other day. Indications: anemia from inadequate iron) 100 Tab 6    acetaminophen (TYLENOL) 500 mg tablet Take 2 Tabs by mouth every six (6) hours. (Patient taking differently: Take 1,000 mg by mouth as needed.) 100 Tab 0    CALCIUM CARBONATE (OYSTER SHELL CALCIUM 500 PO) Take 1 Tablet by mouth daily. latanoprost (XALATAN) 0.005 % ophthalmic solution Administer 1 Drop to both eyes nightly. timolol (TIMOPTIC) 0.5 % ophthalmic solution Administer 1 Drop to right eye two (2) times a day. Past History     Past Medical History:  Past Medical History:   Diagnosis Date    Anemia     Arthritis     Margreta Early lesion, chronic 5/8/2013    Seen on EGD 5/7/13    Chronic obstructive pulmonary disease ([address]     CVA (cerebral vascular accident) ([address]     Tiny punctate infarct right frontal lobe, 10/2016.     HEART (dyspnea on exertion) 6/12/2015    GERD (gastroesophageal reflux disease)     Hypercholesterolemia     Hypertension 9/22/2010    Ill-defined condition     Osteoporosis 9/22/2010    Seizures ([address]     Sick sinus syndrome due to SA node dysfunction ([address] 12/17/2018       Past Surgical History:  Past Surgical History:   Procedure Laterality Date    ENDOSCOPY, COLON, DIAGNOSTIC  2008, 2013    HX CATARACT REMOVAL      HX GI  2013    egd   kia ulcer    HX ORTHOPAEDIC  2010    knee replacement    IMPLANT  LOOP RECORDER  11/17/2016         AK CARDIAC SURG PROCEDURE UNLIST  2008    nuclear stress test normal     AK CARDIAC SURG PROCEDURE UNLIST  2018    pacemaker    AK INS NEW/RPLCMT PRM PM W/TRANSV ELTRD ATRIAL&VENT  2018         SD RMVL IMPLANTABLE PT-ACTIVATED CAR EVENT RECORDER  2018            Family History:  Family History   Problem Relation Age of Onset    Heart Disease Mother     Heart Disease Father     Heart Disease Sister     Alzheimer's Disease Sister     Alcohol abuse Brother        Social History:  Social History     Tobacco Use    Smoking status: Former     Years: 30.00     Types: Cigarettes     Quit date: 2000     Years since quittin.9    Smokeless tobacco: Never   Vaping Use    Vaping Use: Never used   Substance Use Topics    Alcohol use: Yes     Alcohol/week: 6.0 standard drinks     Types: 6 Glasses of wine per week    Drug use: No       Allergies: Allergies   Allergen Reactions    Nitrofurantoin Macrocrystal Other (comments)         Review of Systems   Review of Systems   All other systems reviewed and are negative. Physical Exam   Physical Exam  Vitals and nursing note reviewed. Constitutional:       General: She is not in acute distress. Appearance: Normal appearance. She is well-developed. HENT:      Head: Normocephalic and atraumatic. Eyes:      Extraocular Movements: Extraocular movements intact. Conjunctiva/sclera: Conjunctivae normal.   Neck:      Vascular: No JVD. Trachea: No tracheal deviation. Cardiovascular:      Rate and Rhythm: Normal rate and regular rhythm. Pulses: Normal pulses. Pulmonary:      Effort: Pulmonary effort is normal.      Breath sounds: Normal breath sounds. Musculoskeletal:         General: No swelling or tenderness. Normal range of motion. Cervical back: Neck supple. Comments: No deformity. TTP over the R paraspinal lumbar region. Mild ttp over the R posteroinferior ribs. Normal ROM of the back. No midline T or L spine ttp. Skin:     General: Skin is warm and dry. Findings: No bruising or rash.    Neurological:      Mental Status: She is alert and oriented to person, place, and time. Comments: No focal deficits   Psychiatric:         Behavior: Behavior normal.         Thought Content: Thought content normal.         Judgment: Judgment normal.       Diagnostic Study Results     Labs -  No results found for this or any previous visit (from the past 72 hour(s)). Radiologic Studies -   XR SPINE LUMB 2 OR 3 V   Final Result      1. No acute osseous abnormality. 2. Chronic compression fractures of T7 and T12.   3. Multilevel degenerative spondylosis. 4. Severe diffuse osteopenia. XR RIBS RT W PA CXR MIN 3 V   Final Result   No acute process. CT Results  (Last 48 hours)      None          CXR Results  (Last 48 hours)      None            Medical Decision Making   I am the first provider for this patient. I reviewed the vital signs, available nursing notes, past medical history, past surgical history, family history and social history. Vital Signs-Reviewed the patient's vital signs. Patient Vitals for the past 12 hrs:   Temp Pulse Resp BP SpO2   09/10/22 0942 97.1 °F (36.2 °C) 76 16 117/63 98 %         Records Reviewed: Nursing Notes and Old Medical Records    Provider Notes (Medical Decision Making):   Pt presenting with R low back pain after a mechanical fall. Pt is ambulatory and has no neurologic deficits or any concerning signs for cauda equina. Will get x-rays, treat with lidocaine patch. ED Course:   Initial assessment performed. The patients presenting problems have been discussed, and they are in agreement with the care plan formulated and outlined with them. I have encouraged them to ask questions as they arise throughout their visit. Progress Note:  Pt is feeling better. Updated her on her imaging results. She is already aware of her compression fractures. Will have her follow up with pcp      Disposition:      The patient's results have been reviewed with Her. She has been counseled regarding her diagnosis.  She verbally conveys understanding and agreement of the signs, symptoms, diagnosis, treatment, and prognosis and additionally agrees to follow up as recommended with Dr. Kayli Addison MD in 24-48 hours. She also agrees with the care-plan and conveys that all of Her questions have been answered. I have also put together some discharge instructions for Her that include: 1) educational information regarding their diagnosis, 2) how to care for their diagnosis at home, as well a 3) list of reasons why they would want to return to the ED prior to their follow-up appointment, should their condition change. DISCHARGE PLAN:  1. Current Discharge Medication List        2. Follow-up Information       Follow up With Specialties Details Why Contact Info    Kayli Addison MD Internal Medicine Physician Schedule an appointment as soon as possible for a visit  As needed, If symptoms worsen [facility] [address]  [phone]            3. Return to ED if worse     Diagnosis     Clinical Impression:   1. Acute right-sided low back pain without sciatica        Attestations:    Jamel Singh, DO    Please note that this dictation was completed with [employer], the computer voice recognition software. Quite often unanticipated grammatical, syntax, homophones, and other interpretive errors are inadvertently transcribed by the computer software. Please disregard these errors. Please excuse any errors that have escaped final proofreading. Thank you.

## 2022-09-10 NOTE — ED NOTES
Pt was discharged and given instructions by Dr Brunilda Ly . Pt and daughter in law verbalized good understanding of all discharge instructions, and F/U care. All questions answered. Pt in stable condition on discharge. Pt wheeled out to car.

## 2022-09-16 ENCOUNTER — OFFICE VISIT (OUTPATIENT)
Dept: CARDIOLOGY CLINIC | Age: 87
End: 2022-09-16
Payer: COMMERCIAL

## 2022-09-16 DIAGNOSIS — Z95.0 CARDIAC PACEMAKER IN SITU: Primary | ICD-10-CM

## 2022-09-16 PROCEDURE — 93294 REM INTERROG EVL PM/LDLS PM: CPT | Performed by: INTERNAL MEDICINE

## 2022-09-16 PROCEDURE — 93296 REM INTERROG EVL PM/IDS: CPT | Performed by: INTERNAL MEDICINE

## 2022-09-16 NOTE — LETTER
10/12/2022 1:50 PM    Ms. Barajas [address] [address]    Dear Ms. Linda Kay,    We have received your recent remote monitor check of your implanted device on 9/16/22. Your remaining estimated battery life is 6 years and your device is working normally & appropriately. Your next remote monitor check is scheduled for 3/13/2023. This is NOT an in-clinic appointment. This transmission is sent from your home monitor. Please make sure your home monitor is plugged into power and within 10 feet of where you sleep. If you are using the phone applications, please make sure it is open on your smart phone. If you have difficulty sending a transmission, please do NOT call our office. Instead, call tech support for your device as they are better able to assist.    [employer] ([employer])   [phone]    Your next clinic/office check is scheduled for Thursday, 12/8/2022 at 10:20 am.  You will have your device checked then see the provider. Please bring a complete list of your medications with strengths and dosages to this appointment. If you have any questions, please call the Pacemaker/ICD clinic that follows you. We appreciate you staying remotely connected!     Sincerely,    Desire Burdick RN, BSN  Device Coordinator RN  [facility]  [phone]  [facility]  [phone]

## 2022-09-20 RX ORDER — APIXABAN 2.5 MG/1
TABLET, FILM COATED ORAL
Qty: 60 TABLET | Refills: 5 | Status: SHIPPED | OUTPATIENT
Start: 2022-09-20

## 2022-09-23 DIAGNOSIS — I10 ESSENTIAL HYPERTENSION: ICD-10-CM

## 2022-09-23 DIAGNOSIS — D50.0 IRON DEFICIENCY ANEMIA DUE TO CHRONIC BLOOD LOSS: ICD-10-CM

## 2022-09-24 LAB
ALBUMIN SERPL-MCNC: 4.1 G/DL (ref 3.5–5)
ALBUMIN/GLOB SERPL: 1.1 {RATIO} (ref 1.1–2.2)
ALP SERPL-CCNC: 97 U/L (ref 45–117)
ALT SERPL-CCNC: 25 U/L (ref 12–78)
ANION GAP SERPL CALC-SCNC: 4 MMOL/L (ref 5–15)
AST SERPL-CCNC: 21 U/L (ref 15–37)
BASOPHILS # BLD: 0.1 K/UL (ref 0–0.1)
BASOPHILS NFR BLD: 1 % (ref 0–1)
BILIRUB SERPL-MCNC: 0.8 MG/DL (ref 0.2–1)
BUN SERPL-MCNC: 13 MG/DL (ref 6–20)
BUN/CREAT SERPL: 15 (ref 12–20)
CALCIUM SERPL-MCNC: 9.5 MG/DL (ref 8.5–10.1)
CHLORIDE SERPL-SCNC: 100 MMOL/L (ref 97–108)
CHOLEST SERPL-MCNC: 201 MG/DL
CO2 SERPL-SCNC: 30 MMOL/L (ref 21–32)
COMMENT, HOLDF: NORMAL
CREAT SERPL-MCNC: 0.84 MG/DL (ref 0.55–1.02)
DIFFERENTIAL METHOD BLD: ABNORMAL
EOSINOPHIL # BLD: 0.1 K/UL (ref 0–0.4)
EOSINOPHIL NFR BLD: 1 % (ref 0–7)
ERYTHROCYTE [DISTWIDTH] IN BLOOD BY AUTOMATED COUNT: 13.3 % (ref 11.5–14.5)
GLOBULIN SER CALC-MCNC: 3.6 G/DL (ref 2–4)
GLUCOSE SERPL-MCNC: 103 MG/DL (ref 65–100)
HCT VFR BLD AUTO: 47.2 % (ref 35–47)
HDLC SERPL-MCNC: 80 MG/DL
HDLC SERPL: 2.5 {RATIO} (ref 0–5)
HGB BLD-MCNC: 15.1 G/DL (ref 11.5–16)
IMM GRANULOCYTES # BLD AUTO: 0.1 K/UL (ref 0–0.04)
IMM GRANULOCYTES NFR BLD AUTO: 1 % (ref 0–0.5)
IRON SATN MFR SERPL: 28 % (ref 20–50)
IRON SERPL-MCNC: 97 UG/DL (ref 35–150)
LDLC SERPL CALC-MCNC: 99.2 MG/DL (ref 0–100)
LYMPHOCYTES # BLD: 0.8 K/UL (ref 0.8–3.5)
LYMPHOCYTES NFR BLD: 11 % (ref 12–49)
MAGNESIUM SERPL-MCNC: 1.8 MG/DL (ref 1.6–2.4)
MCH RBC QN AUTO: 31.5 PG (ref 26–34)
MCHC RBC AUTO-ENTMCNC: 32 G/DL (ref 30–36.5)
MCV RBC AUTO: 98.3 FL (ref 80–99)
MONOCYTES # BLD: 0.5 K/UL (ref 0–1)
MONOCYTES NFR BLD: 7 % (ref 5–13)
NEUTS SEG # BLD: 5.7 K/UL (ref 1.8–8)
NEUTS SEG NFR BLD: 79 % (ref 32–75)
NRBC # BLD: 0 K/UL (ref 0–0.01)
NRBC BLD-RTO: 0 PER 100 WBC
PLATELET # BLD AUTO: 212 K/UL (ref 150–400)
PMV BLD AUTO: 10.1 FL (ref 8.9–12.9)
POTASSIUM SERPL-SCNC: 4.5 MMOL/L (ref 3.5–5.1)
PROT SERPL-MCNC: 7.7 G/DL (ref 6.4–8.2)
RBC # BLD AUTO: 4.8 M/UL (ref 3.8–5.2)
RBC MORPH BLD: ABNORMAL
SAMPLES BEING HELD,HOLD: NORMAL
SODIUM SERPL-SCNC: 134 MMOL/L (ref 136–145)
TIBC SERPL-MCNC: 342 UG/DL (ref 250–450)
TRIGL SERPL-MCNC: 109 MG/DL (ref ?–150)
VLDLC SERPL CALC-MCNC: 21.8 MG/DL
WBC # BLD AUTO: 7.3 K/UL (ref 3.6–11)

## 2022-10-05 ENCOUNTER — OFFICE VISIT (OUTPATIENT)
Dept: INTERNAL MEDICINE CLINIC | Age: 87
End: 2022-10-05
Payer: COMMERCIAL

## 2022-10-05 VITALS
HEIGHT: 58 IN | TEMPERATURE: 98 F | OXYGEN SATURATION: 90 % | RESPIRATION RATE: 18 BRPM | WEIGHT: 111 LBS | SYSTOLIC BLOOD PRESSURE: 104 MMHG | DIASTOLIC BLOOD PRESSURE: 60 MMHG | HEART RATE: 90 BPM | BODY MASS INDEX: 23.3 KG/M2

## 2022-10-05 DIAGNOSIS — Z79.899 POLYPHARMACY: ICD-10-CM
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
## 2022-10-05 DIAGNOSIS — R63.4 WEIGHT LOSS, UNINTENTIONAL: ICD-10-CM

## 2022-10-05 DIAGNOSIS — Z23 NEEDS FLU SHOT: ICD-10-CM

## 2022-10-05 PROCEDURE — 90694 VACC AIIV4 NO PRSRV 0.5ML IM: CPT | Performed by: INTERNAL MEDICINE

## 2022-10-05 PROCEDURE — 1101F PT FALLS ASSESS-DOCD LE1/YR: CPT | Performed by: INTERNAL MEDICINE

## 2022-10-05 PROCEDURE — G8427 DOCREV CUR MEDS BY ELIG CLIN: HCPCS | Performed by: INTERNAL MEDICINE

## 2022-10-05 PROCEDURE — G8536 NO DOC ELDER MAL SCRN: HCPCS | Performed by: INTERNAL MEDICINE

## 2022-10-05 PROCEDURE — G8510 SCR DEP NEG, NO PLAN REQD: HCPCS | Performed by: INTERNAL MEDICINE

## 2022-10-05 PROCEDURE — 99213 OFFICE O/P EST LOW 20 MIN: CPT | Performed by: INTERNAL MEDICINE

## 2022-10-05 PROCEDURE — G8420 CALC BMI NORM PARAMETERS: HCPCS | Performed by: INTERNAL MEDICINE

## 2022-10-05 PROCEDURE — 1090F PRES/ABSN URINE INCON ASSESS: CPT | Performed by: INTERNAL MEDICINE

## 2022-10-05 PROCEDURE — 90471 IMMUNIZATION ADMIN: CPT | Performed by: INTERNAL MEDICINE

## 2022-10-05 RX ORDER — AMLODIPINE BESYLATE 2.5 MG/1
2.5 TABLET ORAL DAILY
Qty: 90 TABLET | Refills: 1 | Status: SHIPPED | OUTPATIENT
Start: 2022-10-05

## 2022-10-05 NOTE — PROGRESS NOTES
Problem follow up: Elder Briggs returns for follow up visit regarding er visit 2 falls. she was seen 2 weeks ago in er diagnosed with fall not orthostatic  and treated with PT referral.  Workup was significant for neg xray and ekg. Notes, labs, studies, imaging related to this problem during prior visit were available . Since that visit, she has improved. she has been compliant with prescribed treatment. Residual symptoms include: weak dental issues so weight loss  New issues associated with this problem include: lightheaded at times.     Patient Active Problem List    Diagnosis    Pacemaker     Dual chamber pacer [employer] 12/17/2018      Sick sinus syndrome due to SA node dysfunction (HCC)     6 second pause with syncope noted on ILR       PAF (paroxysmal atrial fibrillation) ([address]    Cerebrovascular accident (CVA) ([address]    Seizures ([address]    HTN (hypertension), benign    Diastolic dysfunction    Syncope and collapse    HEART (dyspnea on exertion)    Khadar lesion, chronic     Seen on EGD 5/7/13      Iron deficiency anemia    Osteoporosis     Fosamax  2005  No fractures  In all these years    Restarted 2016      Hypercholesteremia     Vitals:    10/05/22 1352   BP: 98/60   Pulse: 90   Resp: 18   Temp: 98 °F (36.7 °C)   TempSrc: Temporal   SpO2: 90%   Weight: 111 lb (50.3 kg)   Height: 4' 10\" (1.473 m)     Vitals:    10/05/22 1352 10/05/22 1448   BP: 98/60 104/60   Pulse: 90    Resp: 18    Temp: 98 °F (36.7 °C)    TempSrc: Temporal    SpO2: 90%    Weight: 111 lb (50.3 kg)    Height: 4' 10\" (1.473 m)        Reg rhythm  Paced    Facial swelling r ight from dental work    Lab Results   Component Value Date/Time    WBC 7.3 09/23/2022 10:23 AM    Hemoglobin (POC) 11.6 07/10/2013 09:09 PM    HGB 15.1 09/23/2022 10:23 AM    Hematocrit (POC) 34 (L) 07/10/2013 09:09 PM    HCT 47.2 (H) 09/23/2022 10:23 AM    PLATELET 056 01/19/9253 10:23 AM    MCV 98.3 09/23/2022 10:23 AM     Lab Results   Component Value Date/Time GFR est AA >60 09/23/2022 10:23 AM    GFR est non-AA >60 09/23/2022 10:23 AM    Creatinine (POC) 1.3 07/10/2013 09:09 PM    Creatinine 0.84 09/23/2022 10:23 AM    BUN 13 09/23/2022 10:23 AM    BUN (POC) 23 (H) 07/10/2013 09:09 PM    Sodium (POC) 137 07/10/2013 09:09 PM    Sodium 134 (L) 09/23/2022 10:23 AM    Potassium 4.5 09/23/2022 10:23 AM    Potassium (POC) 3.8 07/10/2013 09:09 PM    Chloride (POC) 104 07/10/2013 09:09 PM    Chloride 100 09/23/2022 10:23 AM    CO2 30 09/23/2022 10:23 AM     1. Fall, initial encounter  Continue PT    2. Needs flu shot  now  - INFLUENZA, FLUAD, (AGE 65 Y+), IM, PF, 0.5 ML    3. Polypharmacy  Reduce amlodipine to 2.5 move to evening    4. Weight loss, unintentional  From dental work and issues    Requested Prescriptions     Signed Prescriptions Disp Refills    amLODIPine (NORVASC) 2.5 mg tablet 90 Tablet 1     Sig: Take 1 Tablet by mouth daily.        PT to continue  Keep BP log

## 2022-10-05 NOTE — PROGRESS NOTES
Room: 8  Identified pt with two pt identifiers(name and ). Reviewed record in preparation for visit and have obtained necessary documentation. Chief Complaint   Patient presents with    ED Follow-up    Fall        Vitals:    10/05/22 1352   BP: 98/60   Pulse: 90   Resp: 18   Temp: 98 °F (36.7 °C)   TempSrc: Temporal   SpO2: 90%   Weight: 111 lb (50.3 kg)   Height: 4' 10\" (1.473 m)   PainSc:   0 - No pain       Health Maintenance Due   Topic    Shingrix Vaccine Age 50> (1 of 2)    COVID-19 Vaccine (4 - Booster for Moderna series)    Flu Vaccine (1)       1. \"Have you been to the ER, urgent care clinic since your last visit? Hospitalized since your last visit? \" Yes falls 9/10/22    2. \"Have you seen or consulted any other health care providers outside of the [address] [place] [address] since your last visit? \" No     3. For patients over 45: Has the patient had a colonoscopy? NA - based on age     If the patient is female:    4. For patients over 36: Has the patient had a mammogram? NA - based on age    11. For patients over 21: Has the patient had a pap smear?  NA - based on age    Current Outpatient Medications   Medication Instructions    acetaminophen (TYLENOL) 1,000 mg, Oral, EVERY 6 HOURS    albuterol (PROAIR HFA) 90 mcg/actuation inhaler 2 Puffs, Inhalation, EVERY 4 HOURS AS NEEDED, Ventolin brand requested    amLODIPine (NORVASC) 5 mg tablet TAKE ONE TABLET BY MOUTH EVERY DAY **DUE FOR OFFICE VISIT**    atorvastatin (LIPITOR) 20 mg tablet TAKE ONE TABLET BY MOUTH EVERY DAY    CALCIUM CARBONATE (OYSTER SHELL CALCIUM 500 PO) 1 Tablet, Oral, DAILY    cholecalciferol, vitamin D3, 50 mcg (2,000 unit) tab Oral    Eliquis 2.5 mg tablet TAKE ONE TABLET BY MOUTH TWICE DAILY    ferrous sulfate 325 mg, Oral, EVERY 48 HOURS    latanoprost (XALATAN) 0.005 % ophthalmic solution 1 Drop, Both Eyes, EVERY BEDTIME    levETIRAcetam (KEPPRA) 250 mg tablet TAKE ONE TABLET BY MOUTH 2 TIMES A DAY    lisinopriL (PRINIVIL, ZESTRIL) 5 mg tablet TAKE 1 TABLET BY MOUTH EVERY DAY    metoprolol tartrate (LOPRESSOR) 100 mg IR tablet TAKE ONE TABLET BY MOUTH 2 TIMES A DAY    omeprazole (PRILOSEC) 20 mg capsule TAKE ONE CAPSULE BY MOUTH EVERY DAY 30 MINUTES BEFORE A MEAL FOR STOMACH ACID    timolol (TIMOPTIC) 0.5 % ophthalmic solution 1 Drop, 2 TIMES DAILY    vit C/E/zinc/lutein/zeaxanthin (OCUVITE EYE HEALTH PO) Oral, 2 TIMES DAILY       Allergies   Allergen Reactions    Nitrofurantoin Macrocrystal Other (comments)       Immunization History   Administered Date(s) Administered     Influenza, FLUZONE (age 72 y+), High Dose 11/10/2021    COVID-19, MODERNA BLUE border, Primary or Immunocompromised, (age 18y+), IM, 100 mcg/0.5mL 03/06/2021, 04/05/2021, 11/20/2021    Influenza High Dose Vaccine PF 10/13/2015, 10/20/2016, 10/19/2017, 10/21/2020    Influenza Vaccine 10/02/2013, 10/21/2020    Influenza Vaccine (Tri) Adjuvanted (>65 Yrs FLUAD TRI 31739) 10/09/2018    Influenza, FLUARIX, FLULAVAL, FLUZONE (age 10 mo+) AND AFLURIA, (age 1 y+), PF, 0.5mL 09/15/2014    Pneumococcal Conjugate (PCV-13) 11/02/2015    Pneumococcal Polysaccharide (PPSV-23) 09/23/2013    Tdap 03/31/2016       Past Medical History:   Diagnosis Date    Anemia     Arthritis     Riggs Denier lesion, chronic 5/8/2013    Seen on EGD 5/7/13    Chronic obstructive pulmonary disease (HCC)     CVA (cerebral vascular accident) ([address]     Tiny punctate infarct right frontal lobe, 10/2016.     HEART (dyspnea on exertion) 6/12/2015    GERD (gastroesophageal reflux disease)     Hypercholesterolemia     Hypertension 9/22/2010    Ill-defined condition     Osteoporosis 9/22/2010    Seizures ([address]     Sick sinus syndrome due to SA node dysfunction ([address] 12/17/2018

## 2022-10-12 NOTE — PROGRESS NOTES
Chargeable pacer remote    Normal pacer function with <1% RV pacing. See scanned report in  for details.

## 2022-12-08 ENCOUNTER — CLINICAL SUPPORT (OUTPATIENT)
Dept: CARDIOLOGY CLINIC | Age: 87
End: 2022-12-08
Payer: MEDICARE

## 2022-12-08 ENCOUNTER — OFFICE VISIT (OUTPATIENT)
Dept: CARDIOLOGY CLINIC | Age: 87
End: 2022-12-08

## 2022-12-08 VITALS
HEIGHT: 58 IN | WEIGHT: 111 LBS | DIASTOLIC BLOOD PRESSURE: 70 MMHG | BODY MASS INDEX: 23.3 KG/M2 | RESPIRATION RATE: 15 BRPM | SYSTOLIC BLOOD PRESSURE: 110 MMHG

## 2022-12-08 DIAGNOSIS — Z86.73 HX OF COMPLETED STROKE: ICD-10-CM

## 2022-12-08 DIAGNOSIS — I48.92 ATRIAL FLUTTER, PAROXYSMAL (HCC): ICD-10-CM

## 2022-12-08 DIAGNOSIS — I49.5 SICK SINUS SYNDROME (HCC): ICD-10-CM

## 2022-12-08 DIAGNOSIS — Z95.0 CARDIAC PACEMAKER IN SITU: Primary | ICD-10-CM

## 2022-12-08 DIAGNOSIS — I48.0 PAF (PAROXYSMAL ATRIAL FIBRILLATION) (HCC): ICD-10-CM

## 2022-12-08 DIAGNOSIS — R29.6 FREQUENT FALLS: ICD-10-CM

## 2022-12-08 DIAGNOSIS — K25.7: ICD-10-CM

## 2023-01-01 DIAGNOSIS — I10 HTN (HYPERTENSION), BENIGN: ICD-10-CM

## 2023-01-01 DIAGNOSIS — D50.0 IRON DEFICIENCY ANEMIA DUE TO CHRONIC BLOOD LOSS: ICD-10-CM

## 2023-01-01 DIAGNOSIS — E78.00 HYPERCHOLESTEREMIA: ICD-10-CM

## 2023-01-01 DIAGNOSIS — I63.9 CEREBROVASCULAR ACCIDENT (CVA), UNSPECIFIED MECHANISM (HCC): ICD-10-CM

## 2023-01-01 RX ORDER — LISINOPRIL 5 MG/1
TABLET ORAL
Qty: 90 TABLET | Refills: 1 | Status: SHIPPED | OUTPATIENT
Start: 2023-01-01

## 2023-01-01 RX ORDER — ATORVASTATIN CALCIUM 20 MG/1
TABLET, FILM COATED ORAL
Qty: 90 TABLET | Refills: 1 | Status: SHIPPED | OUTPATIENT
Start: 2023-01-01

## 2023-01-05 RX ORDER — AMLODIPINE BESYLATE 2.5 MG/1
TABLET ORAL
Qty: 90 TABLET | Refills: 3 | Status: SHIPPED | OUTPATIENT
Start: 2023-01-05

## 2023-02-09 RX ORDER — METOPROLOL TARTRATE 100 MG/1
TABLET ORAL
Qty: 180 TABLET | Refills: 1 | Status: SHIPPED | OUTPATIENT
Start: 2023-02-09

## 2023-02-09 NOTE — TELEPHONE ENCOUNTER
Received refill request for metoprolol tartrate 100 mg po tabs. Refill request authorized.     Future Appointments   Date Time Provider Ian Guerrier   3/13/2023  1:45 PM Julee AMES BS AMB   7/27/2023  3:00 PM [email or web address], JANIA ROWLEY AMB   7/27/2023  3:20 PM MD KWAKU Ferrer BS AMB

## 2023-04-01 DIAGNOSIS — D50.0 IRON DEFICIENCY ANEMIA DUE TO CHRONIC BLOOD LOSS: ICD-10-CM

## 2023-04-01 DIAGNOSIS — I63.9 CEREBROVASCULAR ACCIDENT (CVA), UNSPECIFIED MECHANISM (HCC): ICD-10-CM

## 2023-04-01 DIAGNOSIS — I10 HTN (HYPERTENSION), BENIGN: ICD-10-CM

## 2023-04-01 DIAGNOSIS — E78.00 HYPERCHOLESTEREMIA: ICD-10-CM

## 2023-04-01 RX ORDER — OMEPRAZOLE 20 MG/1
CAPSULE, DELAYED RELEASE ORAL
Qty: 90 CAPSULE | Refills: 10 | Status: SHIPPED | OUTPATIENT
Start: 2023-04-01

## 2023-04-01 RX ORDER — APIXABAN 2.5 MG/1
TABLET, FILM COATED ORAL
Qty: 60 TABLET | Refills: 5 | Status: SHIPPED | OUTPATIENT
Start: 2023-04-01

## 2023-05-02 NOTE — PROGRESS NOTES
Cardiac Electrophysiology Office Note     Subjective: Joanna Hernandez is a 80 y.o. patient who presents for follow up    Her son with her    She uses walker and cane    Medtronic dual chamber pacemaker (DOI 12/17/2018). She had 2 falls in 09/2022, prompted decrease in amlodipine. She denies recurrent falls or lightheadedness since then. Denies palpitations. Approx 4 hours of PAF noted on device check today, but she was unaware. Echo in 2016 showed normal LVEF with mild concentric LVH, mildly dilated LA, mild TR, & mild PH. BP controlled. Anticoagulated with low dose Eliquis (age, weight). Denies bleeding issues. Son present at appointment today. Previous:   S/p Medtronic dual chamber pacemaker (DOI 12/17/2018) due to syncope with 6 second pause. Previous ILR showed AFL. MRI brain in 2016 showed small acute posterior frontal centrum semiovale lacunar infarct. Chronic Ga Nearing lesion noted during EGD in 2013. No family hx of pacemaker. Mother/father and brother CAD.            Problem List  Date Reviewed: 7/27/2022            Codes Class Noted    Pacemaker ICD-10-CM: Z95.0  ICD-9-CM: V45.01  12/17/2018    Overview Signed 12/17/2018 12:54 PM by Jesus Marin MD     Dual chamber pacer Medtronic 12/17/2018             Sick sinus syndrome due to SA node dysfunction [facility]) ICD-10-CM: I49.5  ICD-9-CM: 427.81  12/17/2018    Overview Signed 12/17/2018 12:55 PM by Jesus Marin MD     6 second pause with syncope noted on ILR              PAF (paroxysmal atrial fibrillation) ([facility] [address] ICD-10-CM: I48.0  ICD-9-CM: 427.31  12/17/2018        Cerebrovascular accident (CVA) ([facility] [address] ICD-10-CM: I63.9  ICD-9-CM: 434.91  8/14/2017        Seizures ([facility] [address] ICD-10-CM: R56.9  ICD-9-CM: 780.39  Unknown        HTN (hypertension), benign ICD-10-CM: I10  ICD-9-CM: 401.1  9/15/0180        Diastolic dysfunction DZA-23-ZX: I51.89  ICD-9-CM: 429.9  4/1/2016        Syncope and collapse ICD-10-CM: R55  ICD-9-CM: 780.2  3/31/2016        HEART (dyspnea on exertion) ICD-10-CM: R06.09  ICD-9-CM: 786.09  6/12/2015        Khadar lesion, chronic ICD-10-CM: K25.7  ICD-9-CM: 531.70  5/8/2013    Overview Signed 5/8/2013  5:52 PM by Linda Walker MD     Seen on EGD 5/7/13             Iron deficiency anemia ICD-10-CM: D50.9  ICD-9-CM: 280.9  9/18/2012        Osteoporosis ICD-10-CM: M81.0  ICD-9-CM: 733.00  9/22/2010    Overview Addendum 2/20/2019  2:58 PM by Linda Walker MD     Fosamax  2005  No fractures  In all these years    Restarted 2016             Hypercholesteremia ICD-10-CM: E78.00  ICD-9-CM: 272.0  9/22/2010         Current Outpatient Medications   Medication Sig    amLODIPine (NORVASC) 2.5 mg tablet Take 1 Tablet by mouth daily. Eliquis 2.5 mg tablet TAKE ONE TABLET BY MOUTH TWICE DAILY    levETIRAcetam (KEPPRA) 250 mg tablet TAKE ONE TABLET BY MOUTH 2 TIMES A DAY    metoprolol tartrate (LOPRESSOR) 100 mg IR tablet TAKE ONE TABLET BY MOUTH 2 TIMES A DAY    lisinopriL (PRINIVIL, ZESTRIL) 5 mg tablet TAKE 1 TABLET BY MOUTH EVERY DAY    atorvastatin (LIPITOR) 20 mg tablet TAKE ONE TABLET BY MOUTH EVERY DAY    omeprazole (PRILOSEC) 20 mg capsule TAKE ONE CAPSULE BY MOUTH EVERY DAY 30 MINUTES BEFORE A MEAL FOR STOMACH ACID    vit C/E/zinc/lutein/zeaxanthin (OCUVITE EYE HEALTH PO) Take  by mouth two (2) times a day. cholecalciferol, vitamin D3, 50 mcg (2,000 unit) tab Take  by mouth. albuterol (PROAIR HFA) 90 mcg/actuation inhaler Take 2 Puffs by inhalation every four (4) hours as needed for Wheezing or Shortness of Breath. Ventolin brand requested (Patient taking differently: Take 2 Puffs by inhalation as needed for Shortness of Breath. Ventolin brand requested)    ferrous sulfate 325 mg (65 mg iron) tablet Take 1 Tab by mouth every fourty-eight (48) hours. Indications: Iron Deficiency Anemia (Patient taking differently: Take  by mouth every other day.  Indications: anemia from inadequate iron) acetaminophen (TYLENOL) 500 mg tablet Take 2 Tabs by mouth every six (6) hours. (Patient taking differently: Take 1,000 mg by mouth as needed.)    CALCIUM CARBONATE (OYSTER SHELL CALCIUM 500 PO) Take 1 Tablet by mouth daily. latanoprost (XALATAN) 0.005 % ophthalmic solution Administer 1 Drop to both eyes nightly. timolol (TIMOPTIC) 0.5 % ophthalmic solution Administer 1 Drop to right eye two (2) times a day. No current facility-administered medications for this visit. Allergies   Allergen Reactions   · Nitrofurantoin Macrocrystal Other (comments)     Past Medical History:   Diagnosis Date   · Anemia   · Arthritis   · Lesle Lorna lesion, chronic 5/8/2013   Seen on EGD 5/7/13   · Chronic obstructive pulmonary disease (HCC)   · CVA (cerebral vascular accident) ([facility] [address]   Tiny punctate infarct right frontal lobe, 10/2016.    · HEART (dyspnea on exertion) 6/12/2015   · GERD (gastroesophageal reflux disease)   · Hypercholesterolemia   · Hypertension 9/22/2010   · Ill-defined condition   · Osteoporosis 9/22/2010   · Seizures ([facility] [address]   · Sick sinus syndrome due to SA node dysfunction ([facility] [address] 12/17/2018     Past Surgical History:   Procedure Laterality Date   · ENDOSCOPY, COLON, DIAGNOSTIC 2008, 2013   · HX CATARACT REMOVAL   · HX GI 2013   egd   kia ulcer   · HX ORTHOPAEDIC 2010   knee replacement   · IMPLANT  LOOP RECORDER 11/17/2016     · AR CARDIAC SURG PROCEDURE UNLIST 2008   nuclear stress test normal   · AR CARDIAC SURG PROCEDURE UNLIST 2018   pacemaker   · AR INS NEW/RPLCMT PRM PM W/TRANSV ELTRD ATRIAL&VENT 12/17/2018     · AR RMVL IMPLANTABLE PT-ACTIVATED CAR EVENT RECORDER 12/17/2018       Family History   Problem Relation Age of Onset   · Heart Disease Mother   · Heart Disease Father   · Heart Disease Sister   · Alzheimer's Disease Sister   · Alcohol abuse Brother     Social History     Tobacco Use   · Smoking status: Former   Years: 30.00   Types: Cigarettes   Quit date: 9/21/2000   Years since quitting: 22.2   · Smokeless tobacco: Never   Substance Use Topics   · Alcohol use: Yes   Alcohol/week: 6.0 standard drinks   Types: 6 Glasses of wine per week         Review of Systems: All other systems otherwise negative. Constitutional: Negative for fever, chills, weight loss, malaise/fatigue. HEENT: Negative for nosebleeds, vision changes. + Council. Respiratory: Negative for cough, hemoptysis. Cardiovascular: + rare chest \"tenderness\", no palpitations, orthopnea, claudication, syncope, and PND. Gastrointestinal: Negative for nausea, vomiting, soft stools     Genitourinary: Negative for dysuria, and hematuria. + urinary incontinence   Musculoskeletal: Negative for myalgias, + arthralgia. Skin: Negative for rash. Heme: Does not bleed or bruise easily. Neurological: Negative for speech change and focal weakness. + 2 falls       Objective:   Visit Vitals  /70 (BP 1 Location: Left arm, BP Patient Position: Sitting, BP Cuff Size: Small adult)   Resp 15   Ht 4' 10\" (1.473 m)   Wt 111 lb (50.3 kg)   BMI 23.20 kg/m²       Physical Exam:   Constitutional: Well-nourished. No respiratory distress. Head: Normocephalic and atraumatic. Eyes: Pupils are equal, round   ENT: Council. Neck: Supple. No JVD present. Cardiovascular: Normal rate, regular rhythm. Exam reveals no gallop and no friction rub. No murmur heard. Pulmonary/Chest: Effort normal and breath sounds normal. No wheezes. Abdominal: Soft, no tenderness. Musculoskeletal: Moves extremities independently. Ambulates with a cane. Vasc/lymphatic: Trace bilateral ankle edema. Neurological: Alert, oriented. Skin: Skin is warm and dry. Left sided pacemaker site healed. Psychiatric: Normal mood and affect. Behavior is normal. Judgment and thought content normal.           Assessment/Plan:     Imaging/Studies:   Echo (11/15/2016): LVEF 60-65%, mild concentric LVH. LA mildly dilated. Mild TR, mild PH. Mild NE. ICD-10-CM ICD-9-CM    1. Cardiac pacemaker in situ  Z95.0 V45.01       2. Atrial flutter, paroxysmal (HCC)  I48.92 427.32       3. Sick sinus syndrome (HCC)  I49.5 427.81       4. PAF (paroxysmal atrial fibrillation) (HCC)  I48.0 427.31       5. Frequent falls  R29.6 V15.88       6. Khadar lesion, chronic  K25.7 531.70       7. Hx of completed stroke  Z86.73 V12.54           Medtronic dual chamber pacemaker (DOI 12/17/2018): Implanted for syncope & SSS. Device check today shows proper lead & generator function. Generator longevity estimated 6 years. RA 83.6%, RV <0.1%. VT x 11 episodes since 11/04/2021 (some true, some due to PVCs), single fast A & V.  5 episodes AT/AF, longest 4 hrs; overall burden <0.1 hour/day. PAF/AFL: Asymptomatic, with rate mainly controlled. Continue metoprolol as previously prescribed. PVCs: Noted on device, asymptomatic. HTN: BP controlled. Amlodipine decreased by PCP due to falls, has improved. Anticoagulation: Continue low dose Eliquis (age, weight) for embolic CVA prophylaxis. Denies bleeding issues, but recent falls put her at risk for bleeding. States that's improving with reduced amlodipine & PT; uses cane or rollator with ambulation. Reviewed risks/benefits/indications for Watchman JINA occlusion procedure. She & son will look over literature, take some time to determine whether they're interested. Thank you for involving me in this patient's care and please call with further concerns or questions. Nini Godfrey M.D.    Electrophysiology/Cardiology   [facility] [place]   [address] , [facility] [address]   [address]   [phone] (severe pain)

## 2023-06-26 ENCOUNTER — TELEPHONE (OUTPATIENT)
Age: 88
End: 2023-06-26

## 2023-06-26 DIAGNOSIS — I10 ESSENTIAL (PRIMARY) HYPERTENSION: Primary | ICD-10-CM

## 2023-06-26 DIAGNOSIS — M80.00XD AGE-RELATED OSTEOPOROSIS WITH CURRENT PATHOLOGICAL FRACTURE, UNSPECIFIED SITE, SUBSEQUENT ENCOUNTER FOR FRACTURE WITH ROUTINE HEALING: ICD-10-CM

## 2023-06-26 DIAGNOSIS — E78.00 PURE HYPERCHOLESTEROLEMIA, UNSPECIFIED: ICD-10-CM

## 2023-06-26 DIAGNOSIS — D50.0 IRON DEFICIENCY ANEMIA DUE TO CHRONIC BLOOD LOSS: ICD-10-CM

## 2023-06-26 DIAGNOSIS — I48.0 PAF (PAROXYSMAL ATRIAL FIBRILLATION) (HCC): ICD-10-CM

## 2023-06-27 ENCOUNTER — TELEPHONE (OUTPATIENT)
Age: 88
End: 2023-06-27

## 2023-06-28 DIAGNOSIS — E78.00 PURE HYPERCHOLESTEROLEMIA, UNSPECIFIED: ICD-10-CM

## 2023-06-28 DIAGNOSIS — I10 ESSENTIAL (PRIMARY) HYPERTENSION: ICD-10-CM

## 2023-06-28 DIAGNOSIS — D50.0 IRON DEFICIENCY ANEMIA DUE TO CHRONIC BLOOD LOSS: ICD-10-CM

## 2023-06-28 LAB
ALBUMIN SERPL-MCNC: 4 G/DL (ref 3.5–5)
ALBUMIN/GLOB SERPL: 1.4 (ref 1.1–2.2)
ALP SERPL-CCNC: 77 U/L (ref 45–117)
ALT SERPL-CCNC: 30 U/L (ref 12–78)
ANION GAP SERPL CALC-SCNC: 6 MMOL/L (ref 5–15)
AST SERPL-CCNC: 28 U/L (ref 15–37)
BASOPHILS # BLD: 0.1 K/UL (ref 0–0.1)
BASOPHILS NFR BLD: 1 % (ref 0–1)
BILIRUB SERPL-MCNC: 1.1 MG/DL (ref 0.2–1)
BUN SERPL-MCNC: 10 MG/DL (ref 6–20)
BUN/CREAT SERPL: 14 (ref 12–20)
CALCIUM SERPL-MCNC: 9.4 MG/DL (ref 8.5–10.1)
CHLORIDE SERPL-SCNC: 105 MMOL/L (ref 97–108)
CHOLEST SERPL-MCNC: 174 MG/DL
CO2 SERPL-SCNC: 29 MMOL/L (ref 21–32)
CREAT SERPL-MCNC: 0.71 MG/DL (ref 0.55–1.02)
DIFFERENTIAL METHOD BLD: ABNORMAL
EOSINOPHIL # BLD: 0.2 K/UL (ref 0–0.4)
EOSINOPHIL NFR BLD: 3 % (ref 0–7)
ERYTHROCYTE [DISTWIDTH] IN BLOOD BY AUTOMATED COUNT: 13.1 % (ref 11.5–14.5)
GLOBULIN SER CALC-MCNC: 2.8 G/DL (ref 2–4)
GLUCOSE SERPL-MCNC: 94 MG/DL (ref 65–100)
HCT VFR BLD AUTO: 44.5 % (ref 35–47)
HDLC SERPL-MCNC: 84 MG/DL
HDLC SERPL: 2.1 (ref 0–5)
HGB BLD-MCNC: 14.7 G/DL (ref 11.5–16)
IMM GRANULOCYTES # BLD AUTO: 0 K/UL (ref 0–0.04)
IMM GRANULOCYTES NFR BLD AUTO: 1 % (ref 0–0.5)
IRON SATN MFR SERPL: 43 % (ref 20–50)
IRON SERPL-MCNC: 121 UG/DL (ref 35–150)
LDLC SERPL CALC-MCNC: 70.4 MG/DL (ref 0–100)
LYMPHOCYTES # BLD: 1.9 K/UL (ref 0.8–3.5)
LYMPHOCYTES NFR BLD: 32 % (ref 12–49)
MCH RBC QN AUTO: 31.1 PG (ref 26–34)
MCHC RBC AUTO-ENTMCNC: 33 G/DL (ref 30–36.5)
MCV RBC AUTO: 94.1 FL (ref 80–99)
MONOCYTES # BLD: 0.5 K/UL (ref 0–1)
MONOCYTES NFR BLD: 9 % (ref 5–13)
NEUTS SEG # BLD: 3.2 K/UL (ref 1.8–8)
NEUTS SEG NFR BLD: 54 % (ref 32–75)
NRBC # BLD: 0 K/UL (ref 0–0.01)
NRBC BLD-RTO: 0 PER 100 WBC
PLATELET # BLD AUTO: 176 K/UL (ref 150–400)
PMV BLD AUTO: 11.5 FL (ref 8.9–12.9)
POTASSIUM SERPL-SCNC: 4.4 MMOL/L (ref 3.5–5.1)
PROT SERPL-MCNC: 6.8 G/DL (ref 6.4–8.2)
RBC # BLD AUTO: 4.73 M/UL (ref 3.8–5.2)
SODIUM SERPL-SCNC: 140 MMOL/L (ref 136–145)
TIBC SERPL-MCNC: 282 UG/DL (ref 250–450)
TRIGL SERPL-MCNC: 98 MG/DL
VLDLC SERPL CALC-MCNC: 19.6 MG/DL
WBC # BLD AUTO: 6 K/UL (ref 3.6–11)

## 2023-06-29 ENCOUNTER — OFFICE VISIT (OUTPATIENT)
Age: 88
End: 2023-06-29
Payer: MEDICARE

## 2023-06-29 VITALS
BODY MASS INDEX: 24.79 KG/M2 | SYSTOLIC BLOOD PRESSURE: 114 MMHG | HEART RATE: 75 BPM | DIASTOLIC BLOOD PRESSURE: 63 MMHG | OXYGEN SATURATION: 98 % | WEIGHT: 110.2 LBS | TEMPERATURE: 98.7 F | RESPIRATION RATE: 14 BRPM | HEIGHT: 56 IN

## 2023-06-29 DIAGNOSIS — I10 ESSENTIAL (PRIMARY) HYPERTENSION: ICD-10-CM

## 2023-06-29 DIAGNOSIS — M80.00XD AGE-RELATED OSTEOPOROSIS WITH CURRENT PATHOLOGICAL FRACTURE, UNSPECIFIED SITE, SUBSEQUENT ENCOUNTER FOR FRACTURE WITH ROUTINE HEALING: ICD-10-CM

## 2023-06-29 DIAGNOSIS — D50.0 IRON DEFICIENCY ANEMIA SECONDARY TO BLOOD LOSS (CHRONIC): ICD-10-CM

## 2023-06-29 DIAGNOSIS — D50.0 IRON DEFICIENCY ANEMIA DUE TO CHRONIC BLOOD LOSS: ICD-10-CM

## 2023-06-29 DIAGNOSIS — I49.5 SICK SINUS SYNDROME (HCC): ICD-10-CM

## 2023-06-29 DIAGNOSIS — I48.0 PAF (PAROXYSMAL ATRIAL FIBRILLATION) (HCC): Primary | ICD-10-CM

## 2023-06-29 DIAGNOSIS — R56.9 CONVULSIONS, UNSPECIFIED CONVULSION TYPE (HCC): ICD-10-CM

## 2023-06-29 DIAGNOSIS — K25.7: ICD-10-CM

## 2023-06-29 PROCEDURE — 99214 OFFICE O/P EST MOD 30 MIN: CPT | Performed by: INTERNAL MEDICINE

## 2023-06-29 PROCEDURE — 1036F TOBACCO NON-USER: CPT | Performed by: INTERNAL MEDICINE

## 2023-06-29 PROCEDURE — 1090F PRES/ABSN URINE INCON ASSESS: CPT | Performed by: INTERNAL MEDICINE

## 2023-06-29 PROCEDURE — 1123F ACP DISCUSS/DSCN MKR DOCD: CPT | Performed by: INTERNAL MEDICINE

## 2023-06-29 PROCEDURE — G8427 DOCREV CUR MEDS BY ELIG CLIN: HCPCS | Performed by: INTERNAL MEDICINE

## 2023-06-29 PROCEDURE — G8420 CALC BMI NORM PARAMETERS: HCPCS | Performed by: INTERNAL MEDICINE

## 2023-06-29 RX ORDER — OMEPRAZOLE 20 MG/1
20 CAPSULE, DELAYED RELEASE ORAL
Qty: 45 CAPSULE | Refills: 3 | Status: SHIPPED | OUTPATIENT
Start: 2023-06-29

## 2023-06-29 SDOH — ECONOMIC STABILITY: FOOD INSECURITY: WITHIN THE PAST 12 MONTHS, THE FOOD YOU BOUGHT JUST DIDN'T LAST AND YOU DIDN'T HAVE MONEY TO GET MORE.: NEVER TRUE

## 2023-06-29 SDOH — ECONOMIC STABILITY: INCOME INSECURITY: HOW HARD IS IT FOR YOU TO PAY FOR THE VERY BASICS LIKE FOOD, HOUSING, MEDICAL CARE, AND HEATING?: NOT HARD AT ALL

## 2023-06-29 SDOH — ECONOMIC STABILITY: FOOD INSECURITY: WITHIN THE PAST 12 MONTHS, YOU WORRIED THAT YOUR FOOD WOULD RUN OUT BEFORE YOU GOT MONEY TO BUY MORE.: NEVER TRUE

## 2023-06-29 SDOH — ECONOMIC STABILITY: HOUSING INSECURITY
IN THE LAST 12 MONTHS, WAS THERE A TIME WHEN YOU DID NOT HAVE A STEADY PLACE TO SLEEP OR SLEPT IN A SHELTER (INCLUDING NOW)?: NO

## 2023-06-29 ASSESSMENT — PATIENT HEALTH QUESTIONNAIRE - PHQ9
SUM OF ALL RESPONSES TO PHQ QUESTIONS 1-9: 0
SUM OF ALL RESPONSES TO PHQ9 QUESTIONS 1 & 2: 0
2. FEELING DOWN, DEPRESSED OR HOPELESS: 0
SUM OF ALL RESPONSES TO PHQ QUESTIONS 1-9: 0
1. LITTLE INTEREST OR PLEASURE IN DOING THINGS: 0
SUM OF ALL RESPONSES TO PHQ QUESTIONS 1-9: 0
SUM OF ALL RESPONSES TO PHQ QUESTIONS 1-9: 0

## 2023-06-30 DIAGNOSIS — I63.9 CEREBRAL INFARCTION, UNSPECIFIED (HCC): ICD-10-CM

## 2023-06-30 DIAGNOSIS — D50.0 IRON DEFICIENCY ANEMIA SECONDARY TO BLOOD LOSS (CHRONIC): ICD-10-CM

## 2023-06-30 RX ORDER — METOPROLOL TARTRATE 100 MG/1
TABLET ORAL
Qty: 180 TABLET | Refills: 1 | Status: SHIPPED | OUTPATIENT
Start: 2023-06-30

## 2023-07-01 RX ORDER — LISINOPRIL 5 MG/1
TABLET ORAL
Qty: 90 TABLET | Refills: 1 | Status: SHIPPED | OUTPATIENT
Start: 2023-07-01

## 2023-07-01 RX ORDER — ATORVASTATIN CALCIUM 20 MG/1
TABLET, FILM COATED ORAL
Qty: 90 TABLET | Refills: 1 | Status: SHIPPED | OUTPATIENT
Start: 2023-07-01

## 2023-09-28 DIAGNOSIS — R56.9 UNSPECIFIED CONVULSIONS (HCC): ICD-10-CM

## 2023-09-28 RX ORDER — LEVETIRACETAM 250 MG/1
250 TABLET ORAL 2 TIMES DAILY
Qty: 180 TABLET | Refills: 3 | OUTPATIENT
Start: 2023-09-28

## 2023-09-28 RX ORDER — APIXABAN 2.5 MG/1
2.5 TABLET, FILM COATED ORAL 2 TIMES DAILY
Qty: 60 TABLET | Refills: 11 | Status: SHIPPED | OUTPATIENT
Start: 2023-09-28

## 2024-06-19 DIAGNOSIS — D50.0 IRON DEFICIENCY ANEMIA SECONDARY TO BLOOD LOSS (CHRONIC): ICD-10-CM

## 2024-06-19 DIAGNOSIS — I63.9 CEREBRAL INFARCTION, UNSPECIFIED (HCC): ICD-10-CM

## 2024-06-20 DIAGNOSIS — I63.9 CEREBRAL INFARCTION, UNSPECIFIED (HCC): ICD-10-CM

## 2024-06-20 DIAGNOSIS — D50.0 IRON DEFICIENCY ANEMIA SECONDARY TO BLOOD LOSS (CHRONIC): ICD-10-CM

## 2024-06-20 RX ORDER — ATORVASTATIN CALCIUM 20 MG/1
TABLET, FILM COATED ORAL
Qty: 90 TABLET | Refills: 0 | OUTPATIENT
Start: 2024-06-20

## 2024-06-21 RX ORDER — ATORVASTATIN CALCIUM 20 MG/1
20 TABLET, FILM COATED ORAL NIGHTLY
Qty: 90 TABLET | Refills: 1 | OUTPATIENT
Start: 2024-06-21

## 2024-07-22 DIAGNOSIS — K25.7: ICD-10-CM
